# Patient Record
Sex: MALE | Race: OTHER | HISPANIC OR LATINO
[De-identification: names, ages, dates, MRNs, and addresses within clinical notes are randomized per-mention and may not be internally consistent; named-entity substitution may affect disease eponyms.]

---

## 2020-01-01 ENCOUNTER — FORM ENCOUNTER (OUTPATIENT)
Age: 40
End: 2020-01-01

## 2020-01-01 ENCOUNTER — INPATIENT (INPATIENT)
Facility: HOSPITAL | Age: 40
LOS: 8 days | DRG: 682 | End: 2020-10-21
Attending: INTERNAL MEDICINE | Admitting: HOSPITALIST
Payer: MEDICARE

## 2020-01-01 VITALS
SYSTOLIC BLOOD PRESSURE: 114 MMHG | HEIGHT: 66 IN | WEIGHT: 100.09 LBS | OXYGEN SATURATION: 96 % | TEMPERATURE: 98 F | RESPIRATION RATE: 16 BRPM | DIASTOLIC BLOOD PRESSURE: 76 MMHG | HEART RATE: 99 BPM

## 2020-01-01 DIAGNOSIS — Z66 DO NOT RESUSCITATE: ICD-10-CM

## 2020-01-01 DIAGNOSIS — E87.5 HYPERKALEMIA: ICD-10-CM

## 2020-01-01 DIAGNOSIS — F11.10 OPIOID ABUSE, UNCOMPLICATED: ICD-10-CM

## 2020-01-01 DIAGNOSIS — Z51.5 ENCOUNTER FOR PALLIATIVE CARE: ICD-10-CM

## 2020-01-01 DIAGNOSIS — R19.7 DIARRHEA, UNSPECIFIED: ICD-10-CM

## 2020-01-01 DIAGNOSIS — L97.929 NON-PRESSURE CHRONIC ULCER OF UNSPECIFIED PART OF LEFT LOWER LEG WITH UNSPECIFIED SEVERITY: ICD-10-CM

## 2020-01-01 DIAGNOSIS — E83.51 HYPOCALCEMIA: ICD-10-CM

## 2020-01-01 DIAGNOSIS — L03.115 CELLULITIS OF RIGHT LOWER LIMB: ICD-10-CM

## 2020-01-01 DIAGNOSIS — R77.8 OTHER SPECIFIED ABNORMALITIES OF PLASMA PROTEINS: ICD-10-CM

## 2020-01-01 DIAGNOSIS — E87.4 MIXED DISORDER OF ACID-BASE BALANCE: ICD-10-CM

## 2020-01-01 DIAGNOSIS — Z89.511 ACQUIRED ABSENCE OF RIGHT LEG BELOW KNEE: ICD-10-CM

## 2020-01-01 DIAGNOSIS — S78.111A COMPLETE TRAUMATIC AMPUTATION AT LEVEL BETWEEN RIGHT HIP AND KNEE, INITIAL ENCOUNTER: Chronic | ICD-10-CM

## 2020-01-01 DIAGNOSIS — N39.0 URINARY TRACT INFECTION, SITE NOT SPECIFIED: ICD-10-CM

## 2020-01-01 DIAGNOSIS — E83.39 OTHER DISORDERS OF PHOSPHORUS METABOLISM: ICD-10-CM

## 2020-01-01 DIAGNOSIS — R41.82 ALTERED MENTAL STATUS, UNSPECIFIED: ICD-10-CM

## 2020-01-01 DIAGNOSIS — R74.8 ABNORMAL LEVELS OF OTHER SERUM ENZYMES: ICD-10-CM

## 2020-01-01 DIAGNOSIS — I46.9 CARDIAC ARREST, CAUSE UNSPECIFIED: ICD-10-CM

## 2020-01-01 DIAGNOSIS — N17.9 ACUTE KIDNEY FAILURE, UNSPECIFIED: ICD-10-CM

## 2020-01-01 DIAGNOSIS — N18.6 END STAGE RENAL DISEASE: ICD-10-CM

## 2020-01-01 DIAGNOSIS — Z78.1 PHYSICAL RESTRAINT STATUS: ICD-10-CM

## 2020-01-01 DIAGNOSIS — L03.116 CELLULITIS OF LEFT LOWER LIMB: ICD-10-CM

## 2020-01-01 DIAGNOSIS — R11.10 VOMITING, UNSPECIFIED: ICD-10-CM

## 2020-01-01 DIAGNOSIS — B85.4: ICD-10-CM

## 2020-01-01 DIAGNOSIS — G40.901 EPILEPSY, UNSPECIFIED, NOT INTRACTABLE, WITH STATUS EPILEPTICUS: ICD-10-CM

## 2020-01-01 DIAGNOSIS — E86.1 HYPOVOLEMIA: ICD-10-CM

## 2020-01-01 DIAGNOSIS — R63.8 OTHER SYMPTOMS AND SIGNS CONCERNING FOOD AND FLUID INTAKE: ICD-10-CM

## 2020-01-01 DIAGNOSIS — F19.10 OTHER PSYCHOACTIVE SUBSTANCE ABUSE, UNCOMPLICATED: ICD-10-CM

## 2020-01-01 DIAGNOSIS — Z71.89 OTHER SPECIFIED COUNSELING: ICD-10-CM

## 2020-01-01 DIAGNOSIS — F19.11 OTHER PSYCHOACTIVE SUBSTANCE ABUSE, IN REMISSION: ICD-10-CM

## 2020-01-01 DIAGNOSIS — R56.9 UNSPECIFIED CONVULSIONS: ICD-10-CM

## 2020-01-01 DIAGNOSIS — L03.90 CELLULITIS, UNSPECIFIED: ICD-10-CM

## 2020-01-01 DIAGNOSIS — E88.3 TUMOR LYSIS SYNDROME: ICD-10-CM

## 2020-01-01 DIAGNOSIS — D64.9 ANEMIA, UNSPECIFIED: ICD-10-CM

## 2020-01-01 DIAGNOSIS — Z89.512 ACQUIRED ABSENCE OF LEFT LEG BELOW KNEE: ICD-10-CM

## 2020-01-01 DIAGNOSIS — B18.2 CHRONIC VIRAL HEPATITIS C: ICD-10-CM

## 2020-01-01 DIAGNOSIS — Z82.49 FAMILY HISTORY OF ISCHEMIC HEART DISEASE AND OTHER DISEASES OF THE CIRCULATORY SYSTEM: ICD-10-CM

## 2020-01-01 DIAGNOSIS — D47.2 MONOCLONAL GAMMOPATHY: ICD-10-CM

## 2020-01-01 DIAGNOSIS — I24.8 OTHER FORMS OF ACUTE ISCHEMIC HEART DISEASE: ICD-10-CM

## 2020-01-01 DIAGNOSIS — E43 UNSPECIFIED SEVERE PROTEIN-CALORIE MALNUTRITION: ICD-10-CM

## 2020-01-01 DIAGNOSIS — Z83.3 FAMILY HISTORY OF DIABETES MELLITUS: ICD-10-CM

## 2020-01-01 DIAGNOSIS — D50.9 IRON DEFICIENCY ANEMIA, UNSPECIFIED: ICD-10-CM

## 2020-01-01 DIAGNOSIS — B19.20 UNSPECIFIED VIRAL HEPATITIS C WITHOUT HEPATIC COMA: ICD-10-CM

## 2020-01-01 DIAGNOSIS — E87.2 ACIDOSIS: ICD-10-CM

## 2020-01-01 DIAGNOSIS — S78.112A COMPLETE TRAUMATIC AMPUTATION AT LEVEL BETWEEN LEFT HIP AND KNEE, INITIAL ENCOUNTER: Chronic | ICD-10-CM

## 2020-01-01 DIAGNOSIS — Z83.438 FAMILY HISTORY OF OTHER DISORDER OF LIPOPROTEIN METABOLISM AND OTHER LIPIDEMIA: ICD-10-CM

## 2020-01-01 DIAGNOSIS — A04.72 ENTEROCOLITIS DUE TO CLOSTRIDIUM DIFFICILE, NOT SPECIFIED AS RECURRENT: ICD-10-CM

## 2020-01-01 LAB
% ALBUMIN: 27.2 % — SIGNIFICANT CHANGE UP
% ALPHA 1: 8.8 % — SIGNIFICANT CHANGE UP
% ALPHA 2: 17.1 % — SIGNIFICANT CHANGE UP
% BETA: 13.1 % — SIGNIFICANT CHANGE UP
% GAMMA, URINE: 34 % — SIGNIFICANT CHANGE UP
% GAMMA: 33.8 % — SIGNIFICANT CHANGE UP
% M SPIKE: SIGNIFICANT CHANGE UP
ALBUMIN 24H MFR UR ELPH: 30 % — SIGNIFICANT CHANGE UP
ALBUMIN SERPL ELPH-MCNC: 1.2 G/DL — LOW (ref 3.3–5)
ALBUMIN SERPL ELPH-MCNC: 1.5 G/DL — LOW (ref 3.3–5)
ALBUMIN SERPL ELPH-MCNC: 1.7 G/DL — LOW (ref 3.3–5)
ALBUMIN SERPL ELPH-MCNC: 1.9 G/DL — LOW (ref 3.3–5)
ALBUMIN SERPL ELPH-MCNC: 1.9 G/DL — LOW (ref 3.6–5.5)
ALBUMIN SERPL ELPH-MCNC: 2.2 G/DL — LOW (ref 3.3–5)
ALBUMIN SERPL ELPH-MCNC: 2.2 G/DL — LOW (ref 3.3–5)
ALBUMIN/GLOB SERPL ELPH: 0.4 RATIO — SIGNIFICANT CHANGE UP
ALP SERPL-CCNC: 298 U/L — HIGH (ref 40–120)
ALP SERPL-CCNC: 349 U/L — HIGH (ref 40–120)
ALP SERPL-CCNC: 416 U/L — HIGH (ref 40–120)
ALP SERPL-CCNC: 434 U/L — HIGH (ref 40–120)
ALP SERPL-CCNC: 438 U/L — HIGH (ref 40–120)
ALP SERPL-CCNC: 527 U/L — HIGH (ref 40–120)
ALP SERPL-CCNC: 598 U/L — HIGH (ref 40–120)
ALP SERPL-CCNC: 614 U/L — HIGH (ref 40–120)
ALP SERPL-CCNC: 627 U/L — HIGH (ref 40–120)
ALP SERPL-CCNC: 671 U/L — HIGH (ref 40–120)
ALP SERPL-CCNC: 676 U/L — HIGH (ref 40–120)
ALP SERPL-CCNC: SIGNIFICANT CHANGE UP U/L (ref 40–120)
ALPHA1 GLOB 24H MFR UR ELPH: 16.9 % — SIGNIFICANT CHANGE UP
ALPHA1 GLOB SERPL ELPH-MCNC: 0.6 G/DL — HIGH (ref 0.1–0.4)
ALPHA2 GLOB 24H MFR UR ELPH: 5.8 % — SIGNIFICANT CHANGE UP
ALPHA2 GLOB SERPL ELPH-MCNC: 1.2 G/DL — HIGH (ref 0.5–1)
ALT FLD-CCNC: 10 U/L — SIGNIFICANT CHANGE UP (ref 10–45)
ALT FLD-CCNC: 10 U/L — SIGNIFICANT CHANGE UP (ref 10–45)
ALT FLD-CCNC: 11 U/L — SIGNIFICANT CHANGE UP (ref 10–45)
ALT FLD-CCNC: 11 U/L — SIGNIFICANT CHANGE UP (ref 10–45)
ALT FLD-CCNC: 12 U/L — SIGNIFICANT CHANGE UP (ref 10–45)
ALT FLD-CCNC: 13 U/L — SIGNIFICANT CHANGE UP (ref 10–45)
ALT FLD-CCNC: 15 U/L — SIGNIFICANT CHANGE UP (ref 10–45)
ALT FLD-CCNC: SIGNIFICANT CHANGE UP U/L (ref 10–45)
AMMONIA BLD-MCNC: 30 UMOL/L — SIGNIFICANT CHANGE UP (ref 11–55)
AMMONIA BLD-MCNC: 46 UMOL/L — SIGNIFICANT CHANGE UP (ref 11–55)
AMPHET UR-MCNC: NEGATIVE — SIGNIFICANT CHANGE UP
ANA TITR SER: NEGATIVE — SIGNIFICANT CHANGE UP
ANION GAP SERPL CALC-SCNC: 19 MMOL/L — HIGH (ref 5–17)
ANION GAP SERPL CALC-SCNC: 20 MMOL/L — HIGH (ref 5–17)
ANION GAP SERPL CALC-SCNC: 21 MMOL/L — HIGH (ref 5–17)
ANION GAP SERPL CALC-SCNC: 22 MMOL/L — HIGH (ref 5–17)
ANION GAP SERPL CALC-SCNC: 22 MMOL/L — HIGH (ref 5–17)
ANION GAP SERPL CALC-SCNC: 23 MMOL/L — HIGH (ref 5–17)
ANION GAP SERPL CALC-SCNC: 26 MMOL/L — HIGH (ref 5–17)
ANION GAP SERPL CALC-SCNC: 29 MMOL/L — HIGH (ref 5–17)
ANION GAP SERPL CALC-SCNC: 30 MMOL/L — HIGH (ref 5–17)
ANION GAP SERPL CALC-SCNC: 31 MMOL/L — HIGH (ref 5–17)
ANION GAP SERPL CALC-SCNC: 33 MMOL/L — HIGH (ref 5–17)
ANION GAP SERPL CALC-SCNC: 34 MMOL/L — HIGH (ref 5–17)
ANISOCYTOSIS BLD QL: SLIGHT — SIGNIFICANT CHANGE UP
APPEARANCE CSF: CLEAR — SIGNIFICANT CHANGE UP
APPEARANCE SPUN FLD: COLORLESS — SIGNIFICANT CHANGE UP
APPEARANCE UR: CLEAR — SIGNIFICANT CHANGE UP
APTT BLD: 30.3 SEC — SIGNIFICANT CHANGE UP (ref 27.5–35.5)
AST SERPL-CCNC: 21 U/L — SIGNIFICANT CHANGE UP (ref 10–40)
AST SERPL-CCNC: 22 U/L — SIGNIFICANT CHANGE UP (ref 10–40)
AST SERPL-CCNC: 22 U/L — SIGNIFICANT CHANGE UP (ref 10–40)
AST SERPL-CCNC: 23 U/L — SIGNIFICANT CHANGE UP (ref 10–40)
AST SERPL-CCNC: 25 U/L — SIGNIFICANT CHANGE UP (ref 10–40)
AST SERPL-CCNC: 26 U/L — SIGNIFICANT CHANGE UP (ref 10–40)
AST SERPL-CCNC: 26 U/L — SIGNIFICANT CHANGE UP (ref 10–40)
AST SERPL-CCNC: 32 U/L — SIGNIFICANT CHANGE UP (ref 10–40)
AST SERPL-CCNC: 33 U/L — SIGNIFICANT CHANGE UP (ref 10–40)
AST SERPL-CCNC: SIGNIFICANT CHANGE UP U/L (ref 10–40)
AUTO DIFF PNL BLD: NEGATIVE — SIGNIFICANT CHANGE UP
B-GLOBULIN 24H MFR UR ELPH: 13.3 % — SIGNIFICANT CHANGE UP
B-GLOBULIN SERPL ELPH-MCNC: 0.9 G/DL — SIGNIFICANT CHANGE UP (ref 0.5–1)
BACTERIA # UR AUTO: ABNORMAL /HPF
BARBITURATES UR SCN-MCNC: NEGATIVE — SIGNIFICANT CHANGE UP
BASE EXCESS BLDA CALC-SCNC: -10.3 MMOL/L — LOW (ref -2–3)
BASE EXCESS BLDA CALC-SCNC: 1.4 MMOL/L — SIGNIFICANT CHANGE UP (ref -2–3)
BASE EXCESS BLDV CALC-SCNC: 5.2 MMOL/L — SIGNIFICANT CHANGE UP
BASOPHILS # BLD AUTO: 0 K/UL — SIGNIFICANT CHANGE UP (ref 0–0.2)
BASOPHILS # BLD AUTO: 0.01 K/UL — SIGNIFICANT CHANGE UP (ref 0–0.2)
BASOPHILS # BLD AUTO: 0.02 K/UL — SIGNIFICANT CHANGE UP (ref 0–0.2)
BASOPHILS # BLD AUTO: 0.02 K/UL — SIGNIFICANT CHANGE UP (ref 0–0.2)
BASOPHILS # BLD AUTO: 0.03 K/UL — SIGNIFICANT CHANGE UP (ref 0–0.2)
BASOPHILS NFR BLD AUTO: 0 % — SIGNIFICANT CHANGE UP (ref 0–2)
BASOPHILS NFR BLD AUTO: 0.1 % — SIGNIFICANT CHANGE UP (ref 0–2)
BASOPHILS NFR BLD AUTO: 0.2 % — SIGNIFICANT CHANGE UP (ref 0–2)
BASOPHILS NFR BLD AUTO: 0.3 % — SIGNIFICANT CHANGE UP (ref 0–2)
BASOPHILS NFR BLD AUTO: 0.3 % — SIGNIFICANT CHANGE UP (ref 0–2)
BENZODIAZ UR-MCNC: POSITIVE
BILIRUB SERPL-MCNC: 0.2 MG/DL — SIGNIFICANT CHANGE UP (ref 0.2–1.2)
BILIRUB SERPL-MCNC: 0.2 MG/DL — SIGNIFICANT CHANGE UP (ref 0.2–1.2)
BILIRUB SERPL-MCNC: 0.3 MG/DL — SIGNIFICANT CHANGE UP (ref 0.2–1.2)
BILIRUB SERPL-MCNC: 0.4 MG/DL — SIGNIFICANT CHANGE UP (ref 0.2–1.2)
BILIRUB SERPL-MCNC: 0.5 MG/DL — SIGNIFICANT CHANGE UP (ref 0.2–1.2)
BILIRUB SERPL-MCNC: 0.6 MG/DL — SIGNIFICANT CHANGE UP (ref 0.2–1.2)
BILIRUB SERPL-MCNC: 0.8 MG/DL — SIGNIFICANT CHANGE UP (ref 0.2–1.2)
BILIRUB SERPL-MCNC: <0.2 MG/DL — SIGNIFICANT CHANGE UP (ref 0.2–1.2)
BILIRUB UR-MCNC: ABNORMAL
BUN SERPL-MCNC: 185 MG/DL — HIGH (ref 7–23)
BUN SERPL-MCNC: 187 MG/DL — HIGH (ref 7–23)
BUN SERPL-MCNC: 192 MG/DL — HIGH (ref 7–23)
BUN SERPL-MCNC: 43 MG/DL — HIGH (ref 7–23)
BUN SERPL-MCNC: 50 MG/DL — HIGH (ref 7–23)
BUN SERPL-MCNC: 54 MG/DL — HIGH (ref 7–23)
BUN SERPL-MCNC: 59 MG/DL — HIGH (ref 7–23)
BUN SERPL-MCNC: 59 MG/DL — HIGH (ref 7–23)
BUN SERPL-MCNC: 60 MG/DL — HIGH (ref 7–23)
BUN SERPL-MCNC: 62 MG/DL — HIGH (ref 7–23)
BUN SERPL-MCNC: 62 MG/DL — HIGH (ref 7–23)
BUN SERPL-MCNC: 63 MG/DL — HIGH (ref 7–23)
BUN SERPL-MCNC: 81 MG/DL — HIGH (ref 7–23)
BUN SERPL-MCNC: 88 MG/DL — HIGH (ref 7–23)
C DIFF GDH STL QL: SIGNIFICANT CHANGE UP
C DIFF GDH STL QL: SIGNIFICANT CHANGE UP
C-ANCA SER-ACNC: NEGATIVE — SIGNIFICANT CHANGE UP
C3 SERPL-MCNC: 74 MG/DL — LOW (ref 81–157)
C3 SERPL-MCNC: 91 MG/DL — SIGNIFICANT CHANGE UP (ref 81–157)
C4 SERPL-MCNC: 17 MG/DL — SIGNIFICANT CHANGE UP (ref 13–39)
C4 SERPL-MCNC: 22 MG/DL — SIGNIFICANT CHANGE UP (ref 13–39)
CA-I SERPL-SCNC: 0.6 MMOL/L — CRITICAL LOW (ref 1.12–1.3)
CALCIUM SERPL-MCNC: 6.1 MG/DL — CRITICAL LOW (ref 8.4–10.5)
CALCIUM SERPL-MCNC: 6.1 MG/DL — CRITICAL LOW (ref 8.4–10.5)
CALCIUM SERPL-MCNC: 6.4 MG/DL — CRITICAL LOW (ref 8.4–10.5)
CALCIUM SERPL-MCNC: 6.6 MG/DL — LOW (ref 8.4–10.5)
CALCIUM SERPL-MCNC: 6.6 MG/DL — LOW (ref 8.4–10.5)
CALCIUM SERPL-MCNC: 6.8 MG/DL — LOW (ref 8.4–10.5)
CALCIUM SERPL-MCNC: 6.8 MG/DL — LOW (ref 8.4–10.5)
CALCIUM SERPL-MCNC: 7.1 MG/DL — LOW (ref 8.4–10.5)
CALCIUM SERPL-MCNC: 7.3 MG/DL — LOW (ref 8.4–10.5)
CALCIUM SERPL-MCNC: 7.4 MG/DL — LOW (ref 8.4–10.5)
CALCIUM SERPL-MCNC: 7.6 MG/DL — LOW (ref 8.4–10.5)
CALCIUM SERPL-MCNC: 7.6 MG/DL — LOW (ref 8.4–10.5)
CALCIUM SERPL-MCNC: 7.8 MG/DL — LOW (ref 8.4–10.5)
CALCIUM SERPL-MCNC: 8.2 MG/DL — LOW (ref 8.4–10.5)
CALCIUM SERPL-MCNC: 8.4 MG/DL — SIGNIFICANT CHANGE UP (ref 8.4–10.5)
CHLORIDE SERPL-SCNC: 75 MMOL/L — LOW (ref 96–108)
CHLORIDE SERPL-SCNC: 79 MMOL/L — LOW (ref 96–108)
CHLORIDE SERPL-SCNC: 81 MMOL/L — LOW (ref 96–108)
CHLORIDE SERPL-SCNC: 89 MMOL/L — LOW (ref 96–108)
CHLORIDE SERPL-SCNC: 89 MMOL/L — LOW (ref 96–108)
CHLORIDE SERPL-SCNC: 91 MMOL/L — LOW (ref 96–108)
CHLORIDE SERPL-SCNC: 92 MMOL/L — LOW (ref 96–108)
CHLORIDE SERPL-SCNC: 92 MMOL/L — LOW (ref 96–108)
CHLORIDE SERPL-SCNC: 96 MMOL/L — SIGNIFICANT CHANGE UP (ref 96–108)
CHLORIDE SERPL-SCNC: 97 MMOL/L — SIGNIFICANT CHANGE UP (ref 96–108)
CHLORIDE SERPL-SCNC: 98 MMOL/L — SIGNIFICANT CHANGE UP (ref 96–108)
CHLORIDE SERPL-SCNC: 99 MMOL/L — SIGNIFICANT CHANGE UP (ref 96–108)
CHLORIDE UR-SCNC: <20 MMOL/L — SIGNIFICANT CHANGE UP
CK SERPL-CCNC: 257 U/L — HIGH (ref 30–200)
CK SERPL-CCNC: 361 U/L — HIGH (ref 30–200)
CK SERPL-CCNC: 77 U/L — SIGNIFICANT CHANGE UP (ref 30–200)
CO2 SERPL-SCNC: 16 MMOL/L — LOW (ref 22–31)
CO2 SERPL-SCNC: 17 MMOL/L — LOW (ref 22–31)
CO2 SERPL-SCNC: 19 MMOL/L — LOW (ref 22–31)
CO2 SERPL-SCNC: 22 MMOL/L — SIGNIFICANT CHANGE UP (ref 22–31)
CO2 SERPL-SCNC: 22 MMOL/L — SIGNIFICANT CHANGE UP (ref 22–31)
CO2 SERPL-SCNC: 23 MMOL/L — SIGNIFICANT CHANGE UP (ref 22–31)
CO2 SERPL-SCNC: 25 MMOL/L — SIGNIFICANT CHANGE UP (ref 22–31)
CO2 SERPL-SCNC: 28 MMOL/L — SIGNIFICANT CHANGE UP (ref 22–31)
CO2 SERPL-SCNC: 30 MMOL/L — SIGNIFICANT CHANGE UP (ref 22–31)
CO2 SERPL-SCNC: 31 MMOL/L — SIGNIFICANT CHANGE UP (ref 22–31)
COCAINE METAB.OTHER UR-MCNC: POSITIVE
COLOR CSF: SIGNIFICANT CHANGE UP
COLOR SPEC: YELLOW — SIGNIFICANT CHANGE UP
COMMENT - URINE: SIGNIFICANT CHANGE UP
CREAT ?TM UR-MCNC: 21 MG/DL — SIGNIFICANT CHANGE UP
CREAT ?TM UR-MCNC: 36 MG/DL — SIGNIFICANT CHANGE UP
CREAT SERPL-MCNC: 4.09 MG/DL — HIGH (ref 0.5–1.3)
CREAT SERPL-MCNC: 5.58 MG/DL — HIGH (ref 0.5–1.3)
CREAT SERPL-MCNC: 5.64 MG/DL — HIGH (ref 0.5–1.3)
CREAT SERPL-MCNC: 6.13 MG/DL — HIGH (ref 0.5–1.3)
CREAT SERPL-MCNC: 6.92 MG/DL — HIGH (ref 0.5–1.3)
CREAT SERPL-MCNC: 8.25 MG/DL — HIGH (ref 0.5–1.3)
CREAT SERPL-MCNC: 8.31 MG/DL — HIGH (ref 0.5–1.3)
CREAT SERPL-MCNC: 8.36 MG/DL — HIGH (ref 0.5–1.3)
CREAT SERPL-MCNC: 8.4 MG/DL — HIGH (ref 0.5–1.3)
CREAT SERPL-MCNC: 8.49 MG/DL — HIGH (ref 0.5–1.3)
CREAT SERPL-MCNC: 8.65 MG/DL — HIGH (ref 0.5–1.3)
CREAT SERPL-MCNC: 9.62 MG/DL — HIGH (ref 0.5–1.3)
CREAT SERPL-MCNC: 9.86 MG/DL — HIGH (ref 0.5–1.3)
CREAT SERPL-MCNC: 9.92 MG/DL — HIGH (ref 0.5–1.3)
CRYOGLOB SERPL-MCNC: NEGATIVE — SIGNIFICANT CHANGE UP
CRYOGLOB SERPL-MCNC: NEGATIVE — SIGNIFICANT CHANGE UP
CSF PCR RESULT: SIGNIFICANT CHANGE UP
CULTURE RESULTS: SIGNIFICANT CHANGE UP
DIFF PNL FLD: ABNORMAL
DSDNA AB SER-ACNC: <12 IU/ML — SIGNIFICANT CHANGE UP
EOSINOPHIL # BLD AUTO: 0 K/UL — SIGNIFICANT CHANGE UP (ref 0–0.5)
EOSINOPHIL # BLD AUTO: 0.01 K/UL — SIGNIFICANT CHANGE UP (ref 0–0.5)
EOSINOPHIL # BLD AUTO: 0.02 K/UL — SIGNIFICANT CHANGE UP (ref 0–0.5)
EOSINOPHIL # BLD AUTO: 0.02 K/UL — SIGNIFICANT CHANGE UP (ref 0–0.5)
EOSINOPHIL # BLD AUTO: 0.09 K/UL — SIGNIFICANT CHANGE UP (ref 0–0.5)
EOSINOPHIL # BLD AUTO: 0.13 K/UL — SIGNIFICANT CHANGE UP (ref 0–0.5)
EOSINOPHIL # BLD AUTO: 0.19 K/UL — SIGNIFICANT CHANGE UP (ref 0–0.5)
EOSINOPHIL NFR BLD AUTO: 0 % — SIGNIFICANT CHANGE UP (ref 0–6)
EOSINOPHIL NFR BLD AUTO: 0.1 % — SIGNIFICANT CHANGE UP (ref 0–6)
EOSINOPHIL NFR BLD AUTO: 0.3 % — SIGNIFICANT CHANGE UP (ref 0–6)
EOSINOPHIL NFR BLD AUTO: 0.3 % — SIGNIFICANT CHANGE UP (ref 0–6)
EOSINOPHIL NFR BLD AUTO: 0.9 % — SIGNIFICANT CHANGE UP (ref 0–6)
EOSINOPHIL NFR BLD AUTO: 1 % — SIGNIFICANT CHANGE UP (ref 0–6)
EOSINOPHIL NFR BLD AUTO: 1.3 % — SIGNIFICANT CHANGE UP (ref 0–6)
EOSINOPHIL NFR BLD AUTO: 1.8 % — SIGNIFICANT CHANGE UP (ref 0–6)
EPI CELLS # UR: SIGNIFICANT CHANGE UP /HPF (ref 0–5)
GAMMA GLOBULIN: 2.4 G/DL — HIGH (ref 0.6–1.6)
GAMMA INTERFERON BACKGROUND BLD IA-ACNC: 0.01 IU/ML — SIGNIFICANT CHANGE UP
GAMMA INTERFERON BACKGROUND BLD IA-ACNC: 0.02 IU/ML — SIGNIFICANT CHANGE UP
GAS PNL BLDA: SIGNIFICANT CHANGE UP
GAS PNL BLDV: 126 MMOL/L — LOW (ref 138–146)
GAS PNL BLDV: SIGNIFICANT CHANGE UP
GBM IGG SER-ACNC: <1 AI — SIGNIFICANT CHANGE UP
GLUCOSE BLDC GLUCOMTR-MCNC: 113 MG/DL — HIGH (ref 70–99)
GLUCOSE BLDC GLUCOMTR-MCNC: 124 MG/DL — HIGH (ref 70–99)
GLUCOSE BLDC GLUCOMTR-MCNC: 50 MG/DL — LOW (ref 70–99)
GLUCOSE BLDC GLUCOMTR-MCNC: 56 MG/DL — LOW (ref 70–99)
GLUCOSE BLDC GLUCOMTR-MCNC: 60 MG/DL — LOW (ref 70–99)
GLUCOSE BLDC GLUCOMTR-MCNC: 63 MG/DL — LOW (ref 70–99)
GLUCOSE BLDC GLUCOMTR-MCNC: 80 MG/DL — SIGNIFICANT CHANGE UP (ref 70–99)
GLUCOSE BLDC GLUCOMTR-MCNC: 84 MG/DL — SIGNIFICANT CHANGE UP (ref 70–99)
GLUCOSE BLDC GLUCOMTR-MCNC: 86 MG/DL — SIGNIFICANT CHANGE UP (ref 70–99)
GLUCOSE BLDC GLUCOMTR-MCNC: 89 MG/DL — SIGNIFICANT CHANGE UP (ref 70–99)
GLUCOSE BLDC GLUCOMTR-MCNC: 94 MG/DL — SIGNIFICANT CHANGE UP (ref 70–99)
GLUCOSE BLDC GLUCOMTR-MCNC: 95 MG/DL — SIGNIFICANT CHANGE UP (ref 70–99)
GLUCOSE BLDC GLUCOMTR-MCNC: 96 MG/DL — SIGNIFICANT CHANGE UP (ref 70–99)
GLUCOSE BLDC GLUCOMTR-MCNC: 99 MG/DL — SIGNIFICANT CHANGE UP (ref 70–99)
GLUCOSE CSF-MCNC: 45 MG/DL — SIGNIFICANT CHANGE UP (ref 40–70)
GLUCOSE SERPL-MCNC: 292 MG/DL — HIGH (ref 70–99)
GLUCOSE SERPL-MCNC: 52 MG/DL — LOW (ref 70–99)
GLUCOSE SERPL-MCNC: 58 MG/DL — LOW (ref 70–99)
GLUCOSE SERPL-MCNC: 62 MG/DL — LOW (ref 70–99)
GLUCOSE SERPL-MCNC: 65 MG/DL — LOW (ref 70–99)
GLUCOSE SERPL-MCNC: 65 MG/DL — LOW (ref 70–99)
GLUCOSE SERPL-MCNC: 74 MG/DL — SIGNIFICANT CHANGE UP (ref 70–99)
GLUCOSE SERPL-MCNC: 75 MG/DL — SIGNIFICANT CHANGE UP (ref 70–99)
GLUCOSE SERPL-MCNC: 80 MG/DL — SIGNIFICANT CHANGE UP (ref 70–99)
GLUCOSE SERPL-MCNC: 81 MG/DL — SIGNIFICANT CHANGE UP (ref 70–99)
GLUCOSE SERPL-MCNC: 83 MG/DL — SIGNIFICANT CHANGE UP (ref 70–99)
GLUCOSE SERPL-MCNC: 85 MG/DL — SIGNIFICANT CHANGE UP (ref 70–99)
GLUCOSE SERPL-MCNC: 95 MG/DL — SIGNIFICANT CHANGE UP (ref 70–99)
GLUCOSE SERPL-MCNC: 96 MG/DL — SIGNIFICANT CHANGE UP (ref 70–99)
GLUCOSE UR QL: NEGATIVE — SIGNIFICANT CHANGE UP
GRAM STN FLD: SIGNIFICANT CHANGE UP
HAV IGM SER-ACNC: SIGNIFICANT CHANGE UP
HBV CORE IGM SER-ACNC: SIGNIFICANT CHANGE UP
HBV SURFACE AB SER-ACNC: REACTIVE — SIGNIFICANT CHANGE UP
HCO3 BLDA-SCNC: 15 MMOL/L — LOW (ref 21–28)
HCO3 BLDA-SCNC: 25 MMOL/L — SIGNIFICANT CHANGE UP (ref 21–28)
HCO3 BLDV-SCNC: 33 MMOL/L — HIGH (ref 20–27)
HCT VFR BLD CALC: 22 % — LOW (ref 39–50)
HCT VFR BLD CALC: 23.2 % — LOW (ref 39–50)
HCT VFR BLD CALC: 24.8 % — LOW (ref 39–50)
HCT VFR BLD CALC: 26.4 % — LOW (ref 39–50)
HCT VFR BLD CALC: 28.9 % — LOW (ref 39–50)
HCT VFR BLD CALC: 29.7 % — LOW (ref 39–50)
HCT VFR BLD CALC: 29.7 % — LOW (ref 39–50)
HCT VFR BLD CALC: 30 % — LOW (ref 39–50)
HCT VFR BLD CALC: 30.9 % — LOW (ref 39–50)
HCT VFR BLD CALC: 32.4 % — LOW (ref 39–50)
HCT VFR BLD CALC: 35.4 % — LOW (ref 39–50)
HGB BLD-MCNC: 11.2 G/DL — LOW (ref 13–17)
HGB BLD-MCNC: 6.7 G/DL — CRITICAL LOW (ref 13–17)
HGB BLD-MCNC: 7.3 G/DL — LOW (ref 13–17)
HGB BLD-MCNC: 7.8 G/DL — LOW (ref 13–17)
HGB BLD-MCNC: 8.1 G/DL — LOW (ref 13–17)
HGB BLD-MCNC: 8.4 G/DL — LOW (ref 13–17)
HGB BLD-MCNC: 8.8 G/DL — LOW (ref 13–17)
HGB BLD-MCNC: 9.1 G/DL — LOW (ref 13–17)
HGB BLD-MCNC: 9.3 G/DL — LOW (ref 13–17)
HGB BLD-MCNC: 9.4 G/DL — LOW (ref 13–17)
HGB BLD-MCNC: 9.5 G/DL — LOW (ref 13–17)
HIV 1+2 AB+HIV1 P24 AG SERPL QL IA: SIGNIFICANT CHANGE UP
HYPOCHROMIA BLD QL: SLIGHT — SIGNIFICANT CHANGE UP
IMM GRANULOCYTES NFR BLD AUTO: 0.4 % — SIGNIFICANT CHANGE UP (ref 0–1.5)
IMM GRANULOCYTES NFR BLD AUTO: 0.6 % — SIGNIFICANT CHANGE UP (ref 0–1.5)
IMM GRANULOCYTES NFR BLD AUTO: 0.7 % — SIGNIFICANT CHANGE UP (ref 0–1.5)
IMM GRANULOCYTES NFR BLD AUTO: 0.7 % — SIGNIFICANT CHANGE UP (ref 0–1.5)
INR BLD: 1.21 — HIGH (ref 0.88–1.16)
INTERPRETATION 24H UR IFE-IMP: SIGNIFICANT CHANGE UP
INTERPRETATION SERPL IFE-IMP: SIGNIFICANT CHANGE UP
INTERPRETATION SERPL IFE-IMP: SIGNIFICANT CHANGE UP
KAPPA LC SER QL IFE: 48.28 MG/DL — HIGH (ref 0.33–1.94)
KAPPA/LAMBDA FREE LIGHT CHAIN RATIO, SERUM: 0.61 RATIO — SIGNIFICANT CHANGE UP (ref 0.26–1.65)
KETONES UR-MCNC: ABNORMAL MG/DL
LACTATE SERPL-SCNC: 0.6 MMOL/L — SIGNIFICANT CHANGE UP (ref 0.5–2)
LACTATE SERPL-SCNC: 0.8 MMOL/L — SIGNIFICANT CHANGE UP (ref 0.5–2)
LACTATE SERPL-SCNC: 0.8 MMOL/L — SIGNIFICANT CHANGE UP (ref 0.5–2)
LACTATE SERPL-SCNC: 5.5 MMOL/L — CRITICAL HIGH (ref 0.5–2)
LAMBDA LC SER QL IFE: 79.37 MG/DL — HIGH (ref 0.57–2.63)
LEAD BLD-MCNC: 2 UG/DL — SIGNIFICANT CHANGE UP (ref 0–4)
LEUKOCYTE ESTERASE UR-ACNC: ABNORMAL
LIDOCAIN IGE QN: 14 U/L — SIGNIFICANT CHANGE UP (ref 7–60)
LYMPHOCYTES # BLD AUTO: 0.9 K/UL — LOW (ref 1–3.3)
LYMPHOCYTES # BLD AUTO: 1.26 K/UL — SIGNIFICANT CHANGE UP (ref 1–3.3)
LYMPHOCYTES # BLD AUTO: 1.31 K/UL — SIGNIFICANT CHANGE UP (ref 1–3.3)
LYMPHOCYTES # BLD AUTO: 1.36 K/UL — SIGNIFICANT CHANGE UP (ref 1–3.3)
LYMPHOCYTES # BLD AUTO: 12.3 % — LOW (ref 13–44)
LYMPHOCYTES # BLD AUTO: 13 % — SIGNIFICANT CHANGE UP (ref 13–44)
LYMPHOCYTES # BLD AUTO: 18.7 % — SIGNIFICANT CHANGE UP (ref 13–44)
LYMPHOCYTES # BLD AUTO: 19.1 % — SIGNIFICANT CHANGE UP (ref 13–44)
LYMPHOCYTES # BLD AUTO: 2.97 K/UL — SIGNIFICANT CHANGE UP (ref 1–3.3)
LYMPHOCYTES # BLD AUTO: 21.3 % — SIGNIFICANT CHANGE UP (ref 13–44)
LYMPHOCYTES # BLD AUTO: 29 % — SIGNIFICANT CHANGE UP (ref 13–44)
LYMPHOCYTES # BLD AUTO: 3.14 K/UL — SIGNIFICANT CHANGE UP (ref 1–3.3)
LYMPHOCYTES # BLD AUTO: 3.16 K/UL — SIGNIFICANT CHANGE UP (ref 1–3.3)
LYMPHOCYTES # BLD AUTO: 30.6 % — SIGNIFICANT CHANGE UP (ref 13–44)
LYMPHOCYTES # BLD AUTO: 31.5 % — SIGNIFICANT CHANGE UP (ref 13–44)
LYMPHOCYTES # CSF: 1 % — LOW (ref 40–80)
M PROTEIN 24H UR ELPH-MRATE: SIGNIFICANT CHANGE UP
M TB IFN-G BLD-IMP: ABNORMAL
M TB IFN-G BLD-IMP: ABNORMAL
M TB IFN-G CD4+ BCKGRND COR BLD-ACNC: -0.01 IU/ML — SIGNIFICANT CHANGE UP
M TB IFN-G CD4+ BCKGRND COR BLD-ACNC: 0 IU/ML — SIGNIFICANT CHANGE UP
M TB IFN-G CD4+CD8+ BCKGRND COR BLD-ACNC: -0.01 IU/ML — SIGNIFICANT CHANGE UP
M TB IFN-G CD4+CD8+ BCKGRND COR BLD-ACNC: 0 IU/ML — SIGNIFICANT CHANGE UP
M-SPIKE: SIGNIFICANT CHANGE UP (ref 0–0)
MAGNESIUM SERPL-MCNC: 1.8 MG/DL — SIGNIFICANT CHANGE UP (ref 1.6–2.6)
MAGNESIUM SERPL-MCNC: 2 MG/DL — SIGNIFICANT CHANGE UP (ref 1.6–2.6)
MAGNESIUM SERPL-MCNC: 2.1 MG/DL — SIGNIFICANT CHANGE UP (ref 1.6–2.6)
MAGNESIUM SERPL-MCNC: 2.1 MG/DL — SIGNIFICANT CHANGE UP (ref 1.6–2.6)
MAGNESIUM SERPL-MCNC: 2.2 MG/DL — SIGNIFICANT CHANGE UP (ref 1.6–2.6)
MAGNESIUM SERPL-MCNC: 2.7 MG/DL — HIGH (ref 1.6–2.6)
MAGNESIUM SERPL-MCNC: 2.8 MG/DL — HIGH (ref 1.6–2.6)
MANUAL SMEAR VERIFICATION: SIGNIFICANT CHANGE UP
MCHC RBC-ENTMCNC: 24.1 PG — LOW (ref 27–34)
MCHC RBC-ENTMCNC: 24.2 PG — LOW (ref 27–34)
MCHC RBC-ENTMCNC: 24.2 PG — LOW (ref 27–34)
MCHC RBC-ENTMCNC: 24.3 PG — LOW (ref 27–34)
MCHC RBC-ENTMCNC: 24.4 PG — LOW (ref 27–34)
MCHC RBC-ENTMCNC: 24.6 PG — LOW (ref 27–34)
MCHC RBC-ENTMCNC: 24.8 PG — LOW (ref 27–34)
MCHC RBC-ENTMCNC: 24.8 PG — LOW (ref 27–34)
MCHC RBC-ENTMCNC: 25.1 PG — LOW (ref 27–34)
MCHC RBC-ENTMCNC: 29 GM/DL — LOW (ref 32–36)
MCHC RBC-ENTMCNC: 29.1 GM/DL — LOW (ref 32–36)
MCHC RBC-ENTMCNC: 29.6 GM/DL — LOW (ref 32–36)
MCHC RBC-ENTMCNC: 30.1 GM/DL — LOW (ref 32–36)
MCHC RBC-ENTMCNC: 30.5 GM/DL — LOW (ref 32–36)
MCHC RBC-ENTMCNC: 30.6 GM/DL — LOW (ref 32–36)
MCHC RBC-ENTMCNC: 30.7 GM/DL — LOW (ref 32–36)
MCHC RBC-ENTMCNC: 31.5 GM/DL — LOW (ref 32–36)
MCHC RBC-ENTMCNC: 31.5 GM/DL — LOW (ref 32–36)
MCHC RBC-ENTMCNC: 31.6 GM/DL — LOW (ref 32–36)
MCHC RBC-ENTMCNC: 31.7 GM/DL — LOW (ref 32–36)
MCV RBC AUTO: 77 FL — LOW (ref 80–100)
MCV RBC AUTO: 77.1 FL — LOW (ref 80–100)
MCV RBC AUTO: 77.8 FL — LOW (ref 80–100)
MCV RBC AUTO: 78.1 FL — LOW (ref 80–100)
MCV RBC AUTO: 80.7 FL — SIGNIFICANT CHANGE UP (ref 80–100)
MCV RBC AUTO: 80.7 FL — SIGNIFICANT CHANGE UP (ref 80–100)
MCV RBC AUTO: 81.5 FL — SIGNIFICANT CHANGE UP (ref 80–100)
MCV RBC AUTO: 82 FL — SIGNIFICANT CHANGE UP (ref 80–100)
MCV RBC AUTO: 82.8 FL — SIGNIFICANT CHANGE UP (ref 80–100)
MCV RBC AUTO: 83.2 FL — SIGNIFICANT CHANGE UP (ref 80–100)
MCV RBC AUTO: 83.5 FL — SIGNIFICANT CHANGE UP (ref 80–100)
METHADONE UR-MCNC: POSITIVE
MICROCYTES BLD QL: SLIGHT — SIGNIFICANT CHANGE UP
MONOCYTES # BLD AUTO: 0.28 K/UL — SIGNIFICANT CHANGE UP (ref 0–0.9)
MONOCYTES # BLD AUTO: 0.28 K/UL — SIGNIFICANT CHANGE UP (ref 0–0.9)
MONOCYTES # BLD AUTO: 0.33 K/UL — SIGNIFICANT CHANGE UP (ref 0–0.9)
MONOCYTES # BLD AUTO: 0.34 K/UL — SIGNIFICANT CHANGE UP (ref 0–0.9)
MONOCYTES # BLD AUTO: 0.41 K/UL — SIGNIFICANT CHANGE UP (ref 0–0.9)
MONOCYTES # BLD AUTO: 0.43 K/UL — SIGNIFICANT CHANGE UP (ref 0–0.9)
MONOCYTES # BLD AUTO: 0.53 K/UL — SIGNIFICANT CHANGE UP (ref 0–0.9)
MONOCYTES NFR BLD AUTO: 3 % — SIGNIFICANT CHANGE UP (ref 2–14)
MONOCYTES NFR BLD AUTO: 3.3 % — SIGNIFICANT CHANGE UP (ref 2–14)
MONOCYTES NFR BLD AUTO: 3.8 % — SIGNIFICANT CHANGE UP (ref 2–14)
MONOCYTES NFR BLD AUTO: 4.2 % — SIGNIFICANT CHANGE UP (ref 2–14)
MONOCYTES NFR BLD AUTO: 4.4 % — SIGNIFICANT CHANGE UP (ref 2–14)
MONOCYTES NFR BLD AUTO: 4.9 % — SIGNIFICANT CHANGE UP (ref 2–14)
MONOCYTES NFR BLD AUTO: 5.1 % — SIGNIFICANT CHANGE UP (ref 2–14)
MONOCYTES NFR BLD AUTO: 6.1 % — SIGNIFICANT CHANGE UP (ref 2–14)
NEUTROPHILS # BLD AUTO: 4.69 K/UL — SIGNIFICANT CHANGE UP (ref 1.8–7.4)
NEUTROPHILS # BLD AUTO: 4.96 K/UL — SIGNIFICANT CHANGE UP (ref 1.8–7.4)
NEUTROPHILS # BLD AUTO: 5.17 K/UL — SIGNIFICANT CHANGE UP (ref 1.8–7.4)
NEUTROPHILS # BLD AUTO: 6.1 K/UL — SIGNIFICANT CHANGE UP (ref 1.8–7.4)
NEUTROPHILS # BLD AUTO: 6.11 K/UL — SIGNIFICANT CHANGE UP (ref 1.8–7.4)
NEUTROPHILS # BLD AUTO: 6.36 K/UL — SIGNIFICANT CHANGE UP (ref 1.8–7.4)
NEUTROPHILS # BLD AUTO: 6.88 K/UL — SIGNIFICANT CHANGE UP (ref 1.8–7.4)
NEUTROPHILS # CSF: 1 % — SIGNIFICANT CHANGE UP (ref 0–6)
NEUTROPHILS NFR BLD AUTO: 63 % — SIGNIFICANT CHANGE UP (ref 43–77)
NEUTROPHILS NFR BLD AUTO: 63.4 % — SIGNIFICANT CHANGE UP (ref 43–77)
NEUTROPHILS NFR BLD AUTO: 63.6 % — SIGNIFICANT CHANGE UP (ref 43–77)
NEUTROPHILS NFR BLD AUTO: 73.2 % — SIGNIFICANT CHANGE UP (ref 43–77)
NEUTROPHILS NFR BLD AUTO: 73.9 % — SIGNIFICANT CHANGE UP (ref 43–77)
NEUTROPHILS NFR BLD AUTO: 75 % — SIGNIFICANT CHANGE UP (ref 43–77)
NEUTROPHILS NFR BLD AUTO: 83 % — HIGH (ref 43–77)
NEUTROPHILS NFR BLD AUTO: 83.4 % — HIGH (ref 43–77)
NITRITE UR-MCNC: NEGATIVE — SIGNIFICANT CHANGE UP
NRBC # BLD: 0 /100 WBCS — SIGNIFICANT CHANGE UP (ref 0–0)
NRBC NFR CSF: 2 /UL — SIGNIFICANT CHANGE UP (ref 0–5)
OPIATES UR-MCNC: POSITIVE
OSMOLALITY UR: 332 MOSM/KG — SIGNIFICANT CHANGE UP (ref 300–900)
OVALOCYTES BLD QL SMEAR: SLIGHT — SIGNIFICANT CHANGE UP
P-ANCA SER-ACNC: NEGATIVE — SIGNIFICANT CHANGE UP
PCO2 BLDA: 33 MMHG — LOW (ref 35–48)
PCO2 BLDA: 34 MMHG — LOW (ref 35–48)
PCO2 BLDV: 60 MMHG — HIGH (ref 41–51)
PCP SPEC-MCNC: SIGNIFICANT CHANGE UP
PCP UR-MCNC: NEGATIVE — SIGNIFICANT CHANGE UP
PH BLDA: 7.29 — LOW (ref 7.35–7.45)
PH BLDA: 7.48 — HIGH (ref 7.35–7.45)
PH BLDV: 7.35 — SIGNIFICANT CHANGE UP (ref 7.32–7.43)
PH FLD: 8.3 — SIGNIFICANT CHANGE UP
PH UR: 7 — SIGNIFICANT CHANGE UP (ref 5–8)
PHOSPHATE SERPL-MCNC: 10.1 MG/DL — HIGH (ref 2.5–4.5)
PHOSPHATE SERPL-MCNC: 10.6 MG/DL — HIGH (ref 2.5–4.5)
PHOSPHATE SERPL-MCNC: 16.8 MG/DL — HIGH (ref 2.5–4.5)
PHOSPHATE SERPL-MCNC: 6.6 MG/DL — HIGH (ref 2.5–4.5)
PHOSPHATE SERPL-MCNC: 7.6 MG/DL — HIGH (ref 2.5–4.5)
PHOSPHATE SERPL-MCNC: 7.7 MG/DL — HIGH (ref 2.5–4.5)
PHOSPHATE SERPL-MCNC: 8.3 MG/DL — HIGH (ref 2.5–4.5)
PHOSPHATE SERPL-MCNC: 8.6 MG/DL — HIGH (ref 2.5–4.5)
PHOSPHATE SERPL-MCNC: 9 MG/DL — HIGH (ref 2.5–4.5)
PHOSPHOLIPASE A2 RECEPTOR ELISA: <2 RU/ML — SIGNIFICANT CHANGE UP
PHOSPHOLIPASE A2 RECEPTOR IFA: NEGATIVE — SIGNIFICANT CHANGE UP
PLAT MORPH BLD: ABNORMAL
PLATELET # BLD AUTO: 296 K/UL — SIGNIFICANT CHANGE UP (ref 150–400)
PLATELET # BLD AUTO: 330 K/UL — SIGNIFICANT CHANGE UP (ref 150–400)
PLATELET # BLD AUTO: 336 K/UL — SIGNIFICANT CHANGE UP (ref 150–400)
PLATELET # BLD AUTO: 349 K/UL — SIGNIFICANT CHANGE UP (ref 150–400)
PLATELET # BLD AUTO: 351 K/UL — SIGNIFICANT CHANGE UP (ref 150–400)
PLATELET # BLD AUTO: 352 K/UL — SIGNIFICANT CHANGE UP (ref 150–400)
PLATELET # BLD AUTO: 355 K/UL — SIGNIFICANT CHANGE UP (ref 150–400)
PLATELET # BLD AUTO: 367 K/UL — SIGNIFICANT CHANGE UP (ref 150–400)
PLATELET # BLD AUTO: 368 K/UL — SIGNIFICANT CHANGE UP (ref 150–400)
PLATELET # BLD AUTO: 490 K/UL — HIGH (ref 150–400)
PLATELET # BLD AUTO: 577 K/UL — HIGH (ref 150–400)
PO2 BLDA: 119 MMHG — HIGH (ref 83–108)
PO2 BLDA: 168 MMHG — HIGH (ref 83–108)
PO2 BLDV: 38 MMHG — SIGNIFICANT CHANGE UP
POTASSIUM BLDV-SCNC: 5 MMOL/L — HIGH (ref 3.5–4.9)
POTASSIUM SERPL-MCNC: 2.7 MMOL/L — CRITICAL LOW (ref 3.5–5.3)
POTASSIUM SERPL-MCNC: 3 MMOL/L — LOW (ref 3.5–5.3)
POTASSIUM SERPL-MCNC: 3.1 MMOL/L — LOW (ref 3.5–5.3)
POTASSIUM SERPL-MCNC: 3.4 MMOL/L — LOW (ref 3.5–5.3)
POTASSIUM SERPL-MCNC: 3.5 MMOL/L — SIGNIFICANT CHANGE UP (ref 3.5–5.3)
POTASSIUM SERPL-MCNC: 3.7 MMOL/L — SIGNIFICANT CHANGE UP (ref 3.5–5.3)
POTASSIUM SERPL-MCNC: 3.9 MMOL/L — SIGNIFICANT CHANGE UP (ref 3.5–5.3)
POTASSIUM SERPL-MCNC: 4 MMOL/L — SIGNIFICANT CHANGE UP (ref 3.5–5.3)
POTASSIUM SERPL-MCNC: 4.2 MMOL/L — SIGNIFICANT CHANGE UP (ref 3.5–5.3)
POTASSIUM SERPL-MCNC: 4.6 MMOL/L — SIGNIFICANT CHANGE UP (ref 3.5–5.3)
POTASSIUM SERPL-MCNC: 4.7 MMOL/L — SIGNIFICANT CHANGE UP (ref 3.5–5.3)
POTASSIUM SERPL-MCNC: 5.4 MMOL/L — HIGH (ref 3.5–5.3)
POTASSIUM SERPL-MCNC: 5.8 MMOL/L — HIGH (ref 3.5–5.3)
POTASSIUM SERPL-MCNC: SIGNIFICANT CHANGE UP MMOL/L (ref 3.5–5.3)
POTASSIUM SERPL-SCNC: 2.7 MMOL/L — CRITICAL LOW (ref 3.5–5.3)
POTASSIUM SERPL-SCNC: 3 MMOL/L — LOW (ref 3.5–5.3)
POTASSIUM SERPL-SCNC: 3.1 MMOL/L — LOW (ref 3.5–5.3)
POTASSIUM SERPL-SCNC: 3.4 MMOL/L — LOW (ref 3.5–5.3)
POTASSIUM SERPL-SCNC: 3.5 MMOL/L — SIGNIFICANT CHANGE UP (ref 3.5–5.3)
POTASSIUM SERPL-SCNC: 3.7 MMOL/L — SIGNIFICANT CHANGE UP (ref 3.5–5.3)
POTASSIUM SERPL-SCNC: 3.9 MMOL/L — SIGNIFICANT CHANGE UP (ref 3.5–5.3)
POTASSIUM SERPL-SCNC: 4 MMOL/L — SIGNIFICANT CHANGE UP (ref 3.5–5.3)
POTASSIUM SERPL-SCNC: 4.2 MMOL/L — SIGNIFICANT CHANGE UP (ref 3.5–5.3)
POTASSIUM SERPL-SCNC: 4.6 MMOL/L — SIGNIFICANT CHANGE UP (ref 3.5–5.3)
POTASSIUM SERPL-SCNC: 4.7 MMOL/L — SIGNIFICANT CHANGE UP (ref 3.5–5.3)
POTASSIUM SERPL-SCNC: 5.4 MMOL/L — HIGH (ref 3.5–5.3)
POTASSIUM SERPL-SCNC: 5.8 MMOL/L — HIGH (ref 3.5–5.3)
POTASSIUM SERPL-SCNC: SIGNIFICANT CHANGE UP MMOL/L (ref 3.5–5.3)
PROT ?TM UR-MCNC: 1191 MG/DL — HIGH (ref 0–12)
PROT ?TM UR-MCNC: 1483 MG/DL — HIGH (ref 0–12)
PROT ?TM UR-MCNC: 1557 MG/DL — HIGH (ref 0–12)
PROT ?TM UR-MCNC: 1557 MG/DL — HIGH (ref 0–12)
PROT ?TM UR-MCNC: 517 MG/DL — HIGH (ref 0–12)
PROT CSF-MCNC: 156 MG/DL — HIGH (ref 15–45)
PROT PATTERN 24H UR ELPH-IMP: SIGNIFICANT CHANGE UP
PROT PATTERN SERPL ELPH-IMP: SIGNIFICANT CHANGE UP
PROT SERPL-MCNC: 5.6 G/DL — LOW (ref 6–8.3)
PROT SERPL-MCNC: 5.8 G/DL — LOW (ref 6–8.3)
PROT SERPL-MCNC: 6.4 G/DL — SIGNIFICANT CHANGE UP (ref 6–8.3)
PROT SERPL-MCNC: 7 G/DL — SIGNIFICANT CHANGE UP (ref 6–8.3)
PROT SERPL-MCNC: 7 G/DL — SIGNIFICANT CHANGE UP (ref 6–8.3)
PROT SERPL-MCNC: 7.2 G/DL — SIGNIFICANT CHANGE UP (ref 6–8.3)
PROT SERPL-MCNC: 7.3 G/DL — SIGNIFICANT CHANGE UP (ref 6–8.3)
PROT SERPL-MCNC: 7.7 G/DL — SIGNIFICANT CHANGE UP (ref 6–8.3)
PROT SERPL-MCNC: 7.8 G/DL — SIGNIFICANT CHANGE UP (ref 6–8.3)
PROT SERPL-MCNC: 8.8 G/DL — HIGH (ref 6–8.3)
PROT UR-MCNC: >=300 MG/DL
PROT/CREAT UR-RTO: 24.6 RATIO — HIGH (ref 0–0.2)
PROTHROM AB SERPL-ACNC: 14.4 SEC — HIGH (ref 10.6–13.6)
PTH-INTACT FLD-MCNC: 277 PG/ML — HIGH (ref 15–65)
QUANT TB PLUS MITOGEN MINUS NIL: 0 IU/ML — SIGNIFICANT CHANGE UP
QUANT TB PLUS MITOGEN MINUS NIL: 0.01 IU/ML — SIGNIFICANT CHANGE UP
RBC # BLD: 2.7 M/UL — LOW (ref 4.2–5.8)
RBC # BLD: 2.97 M/UL — LOW (ref 4.2–5.8)
RBC # BLD: 3.22 M/UL — LOW (ref 4.2–5.8)
RBC # BLD: 3.27 M/UL — LOW (ref 4.2–5.8)
RBC # BLD: 3.49 M/UL — LOW (ref 4.2–5.8)
RBC # BLD: 3.57 M/UL — LOW (ref 4.2–5.8)
RBC # BLD: 3.62 M/UL — LOW (ref 4.2–5.8)
RBC # BLD: 3.83 M/UL — LOW (ref 4.2–5.8)
RBC # BLD: 3.88 M/UL — LOW (ref 4.2–5.8)
RBC # BLD: 3.89 M/UL — LOW (ref 4.2–5.8)
RBC # BLD: 4.55 M/UL — SIGNIFICANT CHANGE UP (ref 4.2–5.8)
RBC # CSF: 1283 /UL — HIGH (ref 0–0)
RBC # FLD: 17.8 % — HIGH (ref 10.3–14.5)
RBC # FLD: 18 % — HIGH (ref 10.3–14.5)
RBC # FLD: 18.1 % — HIGH (ref 10.3–14.5)
RBC # FLD: 18.4 % — HIGH (ref 10.3–14.5)
RBC # FLD: 18.4 % — HIGH (ref 10.3–14.5)
RBC # FLD: 18.5 % — HIGH (ref 10.3–14.5)
RBC # FLD: 18.6 % — HIGH (ref 10.3–14.5)
RBC # FLD: 19 % — HIGH (ref 10.3–14.5)
RBC # FLD: 19.7 % — HIGH (ref 10.3–14.5)
RBC BLD AUTO: ABNORMAL
RBC CASTS # UR COMP ASSIST: > 10 /HPF
RHEUMATOID FACT SERPL-ACNC: 11 IU/ML — SIGNIFICANT CHANGE UP (ref 0–13)
SAO2 % BLDA: 98 % — SIGNIFICANT CHANGE UP (ref 95–100)
SAO2 % BLDA: 99 % — SIGNIFICANT CHANGE UP (ref 95–100)
SAO2 % BLDV: 69 % — SIGNIFICANT CHANGE UP
SARS-COV-2 IGG SERPL QL IA: NEGATIVE — SIGNIFICANT CHANGE UP
SARS-COV-2 IGM SERPL IA-ACNC: 0.1 INDEX — SIGNIFICANT CHANGE UP
SARS-COV-2 RNA SPEC QL NAA+PROBE: SIGNIFICANT CHANGE UP
SODIUM SERPL-SCNC: 133 MMOL/L — LOW (ref 135–145)
SODIUM SERPL-SCNC: 136 MMOL/L — SIGNIFICANT CHANGE UP (ref 135–145)
SODIUM SERPL-SCNC: 137 MMOL/L — SIGNIFICANT CHANGE UP (ref 135–145)
SODIUM SERPL-SCNC: 138 MMOL/L — SIGNIFICANT CHANGE UP (ref 135–145)
SODIUM SERPL-SCNC: 138 MMOL/L — SIGNIFICANT CHANGE UP (ref 135–145)
SODIUM SERPL-SCNC: 139 MMOL/L — SIGNIFICANT CHANGE UP (ref 135–145)
SODIUM SERPL-SCNC: 140 MMOL/L — SIGNIFICANT CHANGE UP (ref 135–145)
SODIUM UR-SCNC: 46 MMOL/L — SIGNIFICANT CHANGE UP
SP GR SPEC: 1.02 — SIGNIFICANT CHANGE UP (ref 1–1.03)
SPECIMEN SOURCE FLD: SIGNIFICANT CHANGE UP
SPECIMEN SOURCE: SIGNIFICANT CHANGE UP
T3 SERPL-MCNC: 50 NG/DL — LOW (ref 80–200)
T4 AB SER-ACNC: 4.58 UG/DL — SIGNIFICANT CHANGE UP (ref 3.17–11.72)
TARGETS BLD QL SMEAR: SLIGHT — SIGNIFICANT CHANGE UP
THC UR QL: NEGATIVE — SIGNIFICANT CHANGE UP
TRIGL SERPL-MCNC: 354 MG/DL — HIGH (ref 10–149)
TRIGL SERPL-MCNC: 357 MG/DL — HIGH
TROPONIN T SERPL-MCNC: 0.23 NG/ML — CRITICAL HIGH (ref 0–0.01)
TROPONIN T SERPL-MCNC: 0.28 NG/ML — CRITICAL HIGH (ref 0–0.01)
TUBE TYPE: SIGNIFICANT CHANGE UP
URATE SERPL-MCNC: 0.8 MG/DL — LOW (ref 3.4–8.8)
URATE SERPL-MCNC: 1.2 MG/DL — LOW (ref 3.4–8.8)
URATE SERPL-MCNC: 1.4 MG/DL — LOW (ref 3.4–8.8)
URATE SERPL-MCNC: 24.9 MG/DL — HIGH (ref 3.4–8.8)
URATE SERPL-MCNC: 3.9 MG/DL — SIGNIFICANT CHANGE UP (ref 3.4–8.8)
UROBILINOGEN FLD QL: 0.2 E.U./DL — SIGNIFICANT CHANGE UP
VALPROATE SERPL-MCNC: 25.6 UG/ML — LOW (ref 50–100)
VALPROATE SERPL-MCNC: 46.2 UG/ML — LOW (ref 50–100)
VANCOMYCIN FLD-MCNC: 14 UG/ML — SIGNIFICANT CHANGE UP
VANCOMYCIN FLD-MCNC: <4 UG/ML — SIGNIFICANT CHANGE UP
VANCOMYCIN TROUGH SERPL-MCNC: 15.7 UG/ML — SIGNIFICANT CHANGE UP (ref 10–20)
VANCOMYCIN TROUGH SERPL-MCNC: 25.6 UG/ML — CRITICAL HIGH (ref 10–20)
VANCOMYCIN TROUGH SERPL-MCNC: 35.6 UG/ML — CRITICAL HIGH (ref 10–20)
VIT D25+D1,25 OH+D1,25 PNL SERPL-MCNC: 11.2 PG/ML — LOW (ref 19.9–79.3)
WBC # BLD: 10.03 K/UL — SIGNIFICANT CHANGE UP (ref 3.8–10.5)
WBC # BLD: 10.82 K/UL — HIGH (ref 3.8–10.5)
WBC # BLD: 6.27 K/UL — SIGNIFICANT CHANGE UP (ref 3.8–10.5)
WBC # BLD: 6.4 K/UL — SIGNIFICANT CHANGE UP (ref 3.8–10.5)
WBC # BLD: 6.55 K/UL — SIGNIFICANT CHANGE UP (ref 3.8–10.5)
WBC # BLD: 6.61 K/UL — SIGNIFICANT CHANGE UP (ref 3.8–10.5)
WBC # BLD: 7 K/UL — SIGNIFICANT CHANGE UP (ref 3.8–10.5)
WBC # BLD: 7.33 K/UL — SIGNIFICANT CHANGE UP (ref 3.8–10.5)
WBC # BLD: 7.53 K/UL — SIGNIFICANT CHANGE UP (ref 3.8–10.5)
WBC # BLD: 7.98 K/UL — SIGNIFICANT CHANGE UP (ref 3.8–10.5)
WBC # BLD: 9.7 K/UL — SIGNIFICANT CHANGE UP (ref 3.8–10.5)
WBC # FLD AUTO: 10.03 K/UL — SIGNIFICANT CHANGE UP (ref 3.8–10.5)
WBC # FLD AUTO: 10.82 K/UL — HIGH (ref 3.8–10.5)
WBC # FLD AUTO: 6.27 K/UL — SIGNIFICANT CHANGE UP (ref 3.8–10.5)
WBC # FLD AUTO: 6.4 K/UL — SIGNIFICANT CHANGE UP (ref 3.8–10.5)
WBC # FLD AUTO: 6.55 K/UL — SIGNIFICANT CHANGE UP (ref 3.8–10.5)
WBC # FLD AUTO: 6.61 K/UL — SIGNIFICANT CHANGE UP (ref 3.8–10.5)
WBC # FLD AUTO: 7 K/UL — SIGNIFICANT CHANGE UP (ref 3.8–10.5)
WBC # FLD AUTO: 7.33 K/UL — SIGNIFICANT CHANGE UP (ref 3.8–10.5)
WBC # FLD AUTO: 7.53 K/UL — SIGNIFICANT CHANGE UP (ref 3.8–10.5)
WBC # FLD AUTO: 7.98 K/UL — SIGNIFICANT CHANGE UP (ref 3.8–10.5)
WBC # FLD AUTO: 9.7 K/UL — SIGNIFICANT CHANGE UP (ref 3.8–10.5)
WBC UR QL: ABNORMAL /HPF

## 2020-01-01 PROCEDURE — 93010 ELECTROCARDIOGRAM REPORT: CPT

## 2020-01-01 PROCEDURE — 99233 SBSQ HOSP IP/OBS HIGH 50: CPT

## 2020-01-01 PROCEDURE — 99233 SBSQ HOSP IP/OBS HIGH 50: CPT | Mod: GC

## 2020-01-01 PROCEDURE — 76937 US GUIDE VASCULAR ACCESS: CPT | Mod: 26

## 2020-01-01 PROCEDURE — 99291 CRITICAL CARE FIRST HOUR: CPT

## 2020-01-01 PROCEDURE — 99232 SBSQ HOSP IP/OBS MODERATE 35: CPT

## 2020-01-01 PROCEDURE — 71045 X-RAY EXAM CHEST 1 VIEW: CPT | Mod: 26

## 2020-01-01 PROCEDURE — 36000 PLACE NEEDLE IN VEIN: CPT | Mod: 59

## 2020-01-01 PROCEDURE — 36556 INSERT NON-TUNNEL CV CATH: CPT

## 2020-01-01 PROCEDURE — 93971 EXTREMITY STUDY: CPT | Mod: 26,LT

## 2020-01-01 PROCEDURE — 99222 1ST HOSP IP/OBS MODERATE 55: CPT

## 2020-01-01 PROCEDURE — 74177 CT ABD & PELVIS W/CONTRAST: CPT | Mod: 26

## 2020-01-01 PROCEDURE — 99497 ADVNCD CARE PLAN 30 MIN: CPT | Mod: 25

## 2020-01-01 PROCEDURE — 99291 CRITICAL CARE FIRST HOUR: CPT | Mod: CS

## 2020-01-01 PROCEDURE — 95720 EEG PHY/QHP EA INCR W/VEEG: CPT

## 2020-01-01 PROCEDURE — 71045 X-RAY EXAM CHEST 1 VIEW: CPT | Mod: 26,77

## 2020-01-01 PROCEDURE — 70450 CT HEAD/BRAIN W/O DYE: CPT | Mod: 26

## 2020-01-01 PROCEDURE — 99223 1ST HOSP IP/OBS HIGH 75: CPT

## 2020-01-01 PROCEDURE — 74018 RADEX ABDOMEN 1 VIEW: CPT | Mod: 26

## 2020-01-01 PROCEDURE — 93975 VASCULAR STUDY: CPT | Mod: 26

## 2020-01-01 PROCEDURE — 71260 CT THORAX DX C+: CPT | Mod: 26

## 2020-01-01 PROCEDURE — ZZZZZ: CPT

## 2020-01-01 PROCEDURE — 76775 US EXAM ABDO BACK WALL LIM: CPT | Mod: 26

## 2020-01-01 PROCEDURE — 76937 US GUIDE VASCULAR ACCESS: CPT | Mod: 26,59

## 2020-01-01 PROCEDURE — 93306 TTE W/DOPPLER COMPLETE: CPT | Mod: 26

## 2020-01-01 PROCEDURE — 77001 FLUOROGUIDE FOR VEIN DEVICE: CPT | Mod: 26

## 2020-01-01 RX ORDER — HALOPERIDOL DECANOATE 100 MG/ML
5 INJECTION INTRAMUSCULAR ONCE
Refills: 0 | Status: COMPLETED | OUTPATIENT
Start: 2020-01-01 | End: 2020-01-01

## 2020-01-01 RX ORDER — CEFTRIAXONE 500 MG/1
2000 INJECTION, POWDER, FOR SOLUTION INTRAMUSCULAR; INTRAVENOUS EVERY 12 HOURS
Refills: 0 | Status: DISCONTINUED | OUTPATIENT
Start: 2020-01-01 | End: 2020-01-01

## 2020-01-01 RX ORDER — GLUCAGON INJECTION, SOLUTION 0.5 MG/.1ML
1 INJECTION, SOLUTION SUBCUTANEOUS ONCE
Refills: 0 | Status: DISCONTINUED | OUTPATIENT
Start: 2020-01-01 | End: 2020-01-01

## 2020-01-01 RX ORDER — CALCIUM GLUCONATE 100 MG/ML
1 VIAL (ML) INTRAVENOUS ONCE
Refills: 0 | Status: DISCONTINUED | OUTPATIENT
Start: 2020-01-01 | End: 2020-01-01

## 2020-01-01 RX ORDER — PIPERACILLIN AND TAZOBACTAM 4; .5 G/20ML; G/20ML
3.38 INJECTION, POWDER, LYOPHILIZED, FOR SOLUTION INTRAVENOUS ONCE
Refills: 0 | Status: COMPLETED | OUTPATIENT
Start: 2020-01-01 | End: 2020-01-01

## 2020-01-01 RX ORDER — LEVETIRACETAM 250 MG/1
1000 TABLET, FILM COATED ORAL EVERY 12 HOURS
Refills: 0 | Status: DISCONTINUED | OUTPATIENT
Start: 2020-01-01 | End: 2020-01-01

## 2020-01-01 RX ORDER — POTASSIUM CHLORIDE 20 MEQ
40 PACKET (EA) ORAL ONCE
Refills: 0 | Status: COMPLETED | OUTPATIENT
Start: 2020-01-01 | End: 2020-01-01

## 2020-01-01 RX ORDER — METHADONE HYDROCHLORIDE 40 MG/1
90 TABLET ORAL DAILY
Refills: 0 | Status: DISCONTINUED | OUTPATIENT
Start: 2020-01-01 | End: 2020-01-01

## 2020-01-01 RX ORDER — PROPOFOL 10 MG/ML
10 INJECTION, EMULSION INTRAVENOUS
Qty: 1000 | Refills: 0 | Status: DISCONTINUED | OUTPATIENT
Start: 2020-01-01 | End: 2020-01-01

## 2020-01-01 RX ORDER — MIDAZOLAM HYDROCHLORIDE 1 MG/ML
0.24 INJECTION, SOLUTION INTRAMUSCULAR; INTRAVENOUS
Qty: 100 | Refills: 0 | Status: DISCONTINUED | OUTPATIENT
Start: 2020-01-01 | End: 2020-01-01

## 2020-01-01 RX ORDER — ACETAMINOPHEN 500 MG
1000 TABLET ORAL ONCE
Refills: 0 | Status: COMPLETED | OUTPATIENT
Start: 2020-01-01 | End: 2020-01-01

## 2020-01-01 RX ORDER — DEXTROSE 50 % IN WATER 50 %
50 SYRINGE (ML) INTRAVENOUS ONCE
Refills: 0 | Status: DISCONTINUED | OUTPATIENT
Start: 2020-01-01 | End: 2020-01-01

## 2020-01-01 RX ORDER — INFLUENZA VIRUS VACCINE 15; 15; 15; 15 UG/.5ML; UG/.5ML; UG/.5ML; UG/.5ML
0.5 SUSPENSION INTRAMUSCULAR ONCE
Refills: 0 | Status: DISCONTINUED | OUTPATIENT
Start: 2020-01-01 | End: 2020-01-01

## 2020-01-01 RX ORDER — IOHEXOL 300 MG/ML
30 INJECTION, SOLUTION INTRAVENOUS ONCE
Refills: 0 | Status: COMPLETED | OUTPATIENT
Start: 2020-01-01 | End: 2020-01-01

## 2020-01-01 RX ORDER — ACYCLOVIR SODIUM 500 MG
VIAL (EA) INTRAVENOUS
Refills: 0 | Status: DISCONTINUED | OUTPATIENT
Start: 2020-01-01 | End: 2020-01-01

## 2020-01-01 RX ORDER — SODIUM ZIRCONIUM CYCLOSILICATE 10 G/10G
10 POWDER, FOR SUSPENSION ORAL ONCE
Refills: 0 | Status: COMPLETED | OUTPATIENT
Start: 2020-01-01 | End: 2020-01-01

## 2020-01-01 RX ORDER — VANCOMYCIN HCL 1 G
1000 VIAL (EA) INTRAVENOUS ONCE
Refills: 0 | Status: COMPLETED | OUTPATIENT
Start: 2020-01-01 | End: 2020-01-01

## 2020-01-01 RX ORDER — HEPARIN SODIUM 5000 [USP'U]/ML
5000 INJECTION INTRAVENOUS; SUBCUTANEOUS EVERY 8 HOURS
Refills: 0 | Status: DISCONTINUED | OUTPATIENT
Start: 2020-01-01 | End: 2020-01-01

## 2020-01-01 RX ORDER — PHENOBARBITAL 60 MG
120 TABLET ORAL ONCE
Refills: 0 | Status: DISCONTINUED | OUTPATIENT
Start: 2020-01-01 | End: 2020-01-01

## 2020-01-01 RX ORDER — RASBURICASE 7.5 MG
7.5 KIT INTRAVENOUS ONCE
Refills: 0 | Status: DISCONTINUED | OUTPATIENT
Start: 2020-01-01 | End: 2020-01-01

## 2020-01-01 RX ORDER — ACETAMINOPHEN 500 MG
680 TABLET ORAL ONCE
Refills: 0 | Status: COMPLETED | OUTPATIENT
Start: 2020-01-01 | End: 2020-01-01

## 2020-01-01 RX ORDER — HEPARIN SODIUM 5000 [USP'U]/ML
5000 INJECTION INTRAVENOUS; SUBCUTANEOUS EVERY 12 HOURS
Refills: 0 | Status: DISCONTINUED | OUTPATIENT
Start: 2020-01-01 | End: 2020-01-01

## 2020-01-01 RX ORDER — NOREPINEPHRINE BITARTRATE/D5W 8 MG/250ML
0.05 PLASTIC BAG, INJECTION (ML) INTRAVENOUS
Qty: 8 | Refills: 0 | Status: DISCONTINUED | OUTPATIENT
Start: 2020-01-01 | End: 2020-01-01

## 2020-01-01 RX ORDER — HYDROMORPHONE HYDROCHLORIDE 2 MG/ML
2 INJECTION INTRAMUSCULAR; INTRAVENOUS; SUBCUTANEOUS ONCE
Refills: 0 | Status: DISCONTINUED | OUTPATIENT
Start: 2020-01-01 | End: 2020-01-01

## 2020-01-01 RX ORDER — VALPROIC ACID (AS SODIUM SALT) 250 MG/5ML
1000 SOLUTION, ORAL ORAL ONCE
Refills: 0 | Status: COMPLETED | OUTPATIENT
Start: 2020-01-01 | End: 2020-01-01

## 2020-01-01 RX ORDER — DEXTROSE 50 % IN WATER 50 %
50 SYRINGE (ML) INTRAVENOUS ONCE
Refills: 0 | Status: COMPLETED | OUTPATIENT
Start: 2020-01-01 | End: 2020-01-01

## 2020-01-01 RX ORDER — ONDANSETRON 8 MG/1
4 TABLET, FILM COATED ORAL ONCE
Refills: 0 | Status: COMPLETED | OUTPATIENT
Start: 2020-01-01 | End: 2020-01-01

## 2020-01-01 RX ORDER — DEXTROSE 50 % IN WATER 50 %
25 SYRINGE (ML) INTRAVENOUS ONCE
Refills: 0 | Status: DISCONTINUED | OUTPATIENT
Start: 2020-01-01 | End: 2020-01-01

## 2020-01-01 RX ORDER — LACOSAMIDE 50 MG/1
100 TABLET ORAL EVERY 12 HOURS
Refills: 0 | Status: DISCONTINUED | OUTPATIENT
Start: 2020-01-01 | End: 2020-01-01

## 2020-01-01 RX ORDER — SODIUM CHLORIDE 9 MG/ML
1000 INJECTION INTRAMUSCULAR; INTRAVENOUS; SUBCUTANEOUS ONCE
Refills: 0 | Status: COMPLETED | OUTPATIENT
Start: 2020-01-01 | End: 2020-01-01

## 2020-01-01 RX ORDER — MAGNESIUM SULFATE 500 MG/ML
4 VIAL (ML) INJECTION ONCE
Refills: 0 | Status: COMPLETED | OUTPATIENT
Start: 2020-01-01 | End: 2020-01-01

## 2020-01-01 RX ORDER — PHENOBARBITAL 60 MG
65 TABLET ORAL EVERY 12 HOURS
Refills: 0 | Status: DISCONTINUED | OUTPATIENT
Start: 2020-01-01 | End: 2020-01-01

## 2020-01-01 RX ORDER — SODIUM CHLORIDE 9 MG/ML
500 INJECTION INTRAMUSCULAR; INTRAVENOUS; SUBCUTANEOUS ONCE
Refills: 0 | Status: COMPLETED | OUTPATIENT
Start: 2020-01-01 | End: 2020-01-01

## 2020-01-01 RX ORDER — PROPOFOL 10 MG/ML
30 INJECTION, EMULSION INTRAVENOUS
Qty: 500 | Refills: 0 | Status: DISCONTINUED | OUTPATIENT
Start: 2020-01-01 | End: 2020-01-01

## 2020-01-01 RX ORDER — PERMETHRIN CREAM 5% W/W 50 MG/G
1 CREAM TOPICAL ONCE
Refills: 0 | Status: COMPLETED | OUTPATIENT
Start: 2020-01-01 | End: 2020-01-01

## 2020-01-01 RX ORDER — DEXTROSE 10 % IN WATER 10 %
1000 INTRAVENOUS SOLUTION INTRAVENOUS
Refills: 0 | Status: DISCONTINUED | OUTPATIENT
Start: 2020-01-01 | End: 2020-01-01

## 2020-01-01 RX ORDER — INSULIN LISPRO 100/ML
VIAL (ML) SUBCUTANEOUS EVERY 6 HOURS
Refills: 0 | Status: DISCONTINUED | OUTPATIENT
Start: 2020-01-01 | End: 2020-01-01

## 2020-01-01 RX ORDER — PHENOBARBITAL 60 MG
65 TABLET ORAL ONCE
Refills: 0 | Status: DISCONTINUED | OUTPATIENT
Start: 2020-01-01 | End: 2020-01-01

## 2020-01-01 RX ORDER — KETAMINE HYDROCHLORIDE 100 MG/ML
0.5 INJECTION INTRAMUSCULAR; INTRAVENOUS
Qty: 2000 | Refills: 0 | Status: DISCONTINUED | OUTPATIENT
Start: 2020-01-01 | End: 2020-01-01

## 2020-01-01 RX ORDER — SODIUM BICARBONATE 1 MEQ/ML
50 SYRINGE (ML) INTRAVENOUS ONCE
Refills: 0 | Status: DISCONTINUED | OUTPATIENT
Start: 2020-01-01 | End: 2020-01-01

## 2020-01-01 RX ORDER — VANCOMYCIN HCL 1 G
1000 VIAL (EA) INTRAVENOUS ONCE
Refills: 0 | Status: DISCONTINUED | OUTPATIENT
Start: 2020-01-01 | End: 2020-01-01

## 2020-01-01 RX ORDER — MIDAZOLAM HYDROCHLORIDE 1 MG/ML
0.61 INJECTION, SOLUTION INTRAMUSCULAR; INTRAVENOUS
Qty: 100 | Refills: 0 | Status: DISCONTINUED | OUTPATIENT
Start: 2020-01-01 | End: 2020-01-01

## 2020-01-01 RX ORDER — MIDAZOLAM HYDROCHLORIDE 1 MG/ML
0.02 INJECTION, SOLUTION INTRAMUSCULAR; INTRAVENOUS
Qty: 100 | Refills: 0 | Status: DISCONTINUED | OUTPATIENT
Start: 2020-01-01 | End: 2020-01-01

## 2020-01-01 RX ORDER — SODIUM CHLORIDE 9 MG/ML
1000 INJECTION INTRAMUSCULAR; INTRAVENOUS; SUBCUTANEOUS ONCE
Refills: 0 | Status: DISCONTINUED | OUTPATIENT
Start: 2020-01-01 | End: 2020-01-01

## 2020-01-01 RX ORDER — VALPROIC ACID (AS SODIUM SALT) 250 MG/5ML
500 SOLUTION, ORAL ORAL EVERY 12 HOURS
Refills: 0 | Status: DISCONTINUED | OUTPATIENT
Start: 2020-01-01 | End: 2020-01-01

## 2020-01-01 RX ORDER — MIDAZOLAM HYDROCHLORIDE 1 MG/ML
10 INJECTION, SOLUTION INTRAMUSCULAR; INTRAVENOUS ONCE
Refills: 0 | Status: DISCONTINUED | OUTPATIENT
Start: 2020-01-01 | End: 2020-01-01

## 2020-01-01 RX ORDER — NOREPINEPHRINE BITARTRATE/D5W 8 MG/250ML
0.05 PLASTIC BAG, INJECTION (ML) INTRAVENOUS
Qty: 16 | Refills: 0 | Status: DISCONTINUED | OUTPATIENT
Start: 2020-01-01 | End: 2020-01-01

## 2020-01-01 RX ORDER — LEVETIRACETAM 250 MG/1
1000 TABLET, FILM COATED ORAL EVERY 24 HOURS
Refills: 0 | Status: DISCONTINUED | OUTPATIENT
Start: 2020-01-01 | End: 2020-01-01

## 2020-01-01 RX ORDER — FOSPHENYTOIN 50 MG/ML
800 INJECTION INTRAMUSCULAR; INTRAVENOUS ONCE
Refills: 0 | Status: COMPLETED | OUTPATIENT
Start: 2020-01-01 | End: 2020-01-01

## 2020-01-01 RX ORDER — METOCLOPRAMIDE HCL 10 MG
10 TABLET ORAL ONCE
Refills: 0 | Status: COMPLETED | OUTPATIENT
Start: 2020-01-01 | End: 2020-01-01

## 2020-01-01 RX ORDER — ALBUMIN HUMAN 25 %
50 VIAL (ML) INTRAVENOUS
Refills: 0 | Status: COMPLETED | OUTPATIENT
Start: 2020-01-01 | End: 2020-01-01

## 2020-01-01 RX ORDER — KETAMINE HYDROCHLORIDE 100 MG/ML
0.1 INJECTION INTRAMUSCULAR; INTRAVENOUS
Qty: 2000 | Refills: 0 | Status: DISCONTINUED | OUTPATIENT
Start: 2020-01-01 | End: 2020-01-01

## 2020-01-01 RX ORDER — ACETAMINOPHEN 500 MG
1000 TABLET ORAL ONCE
Refills: 0 | Status: DISCONTINUED | OUTPATIENT
Start: 2020-01-01 | End: 2020-01-01

## 2020-01-01 RX ORDER — HALOPERIDOL DECANOATE 100 MG/ML
5 INJECTION INTRAMUSCULAR ONCE
Refills: 0 | Status: DISCONTINUED | OUTPATIENT
Start: 2020-01-01 | End: 2020-01-01

## 2020-01-01 RX ORDER — DESMOPRESSIN ACETATE 0.1 MG/1
14 TABLET ORAL ONCE
Refills: 0 | Status: COMPLETED | OUTPATIENT
Start: 2020-01-01 | End: 2020-01-01

## 2020-01-01 RX ORDER — VANCOMYCIN HCL 1 G
125 VIAL (EA) INTRAVENOUS EVERY 6 HOURS
Refills: 0 | Status: DISCONTINUED | OUTPATIENT
Start: 2020-01-01 | End: 2020-01-01

## 2020-01-01 RX ORDER — SODIUM CHLORIDE 9 MG/ML
500 INJECTION, SOLUTION INTRAVENOUS ONCE
Refills: 0 | Status: COMPLETED | OUTPATIENT
Start: 2020-01-01 | End: 2020-01-01

## 2020-01-01 RX ORDER — DEXTROSE 50 % IN WATER 50 %
25 SYRINGE (ML) INTRAVENOUS ONCE
Refills: 0 | Status: COMPLETED | OUTPATIENT
Start: 2020-01-01 | End: 2020-01-01

## 2020-01-01 RX ORDER — ONDANSETRON 8 MG/1
4 TABLET, FILM COATED ORAL EVERY 6 HOURS
Refills: 0 | Status: DISCONTINUED | OUTPATIENT
Start: 2020-01-01 | End: 2020-01-01

## 2020-01-01 RX ORDER — PANTOPRAZOLE SODIUM 20 MG/1
40 TABLET, DELAYED RELEASE ORAL DAILY
Refills: 0 | Status: DISCONTINUED | OUTPATIENT
Start: 2020-01-01 | End: 2020-01-01

## 2020-01-01 RX ORDER — DEXTROSE 50 % IN WATER 50 %
15 SYRINGE (ML) INTRAVENOUS ONCE
Refills: 0 | Status: DISCONTINUED | OUTPATIENT
Start: 2020-01-01 | End: 2020-01-01

## 2020-01-01 RX ORDER — SODIUM CHLORIDE 9 MG/ML
1000 INJECTION, SOLUTION INTRAVENOUS
Refills: 0 | Status: DISCONTINUED | OUTPATIENT
Start: 2020-01-01 | End: 2020-01-01

## 2020-01-01 RX ORDER — SODIUM CHLORIDE 9 MG/ML
1000 INJECTION INTRAMUSCULAR; INTRAVENOUS; SUBCUTANEOUS
Refills: 0 | Status: DISCONTINUED | OUTPATIENT
Start: 2020-01-01 | End: 2020-01-01

## 2020-01-01 RX ORDER — MIDAZOLAM HYDROCHLORIDE 1 MG/ML
5 INJECTION, SOLUTION INTRAMUSCULAR; INTRAVENOUS ONCE
Refills: 0 | Status: DISCONTINUED | OUTPATIENT
Start: 2020-01-01 | End: 2020-01-01

## 2020-01-01 RX ORDER — HEPARIN SODIUM 5000 [USP'U]/ML
5000 INJECTION INTRAVENOUS; SUBCUTANEOUS ONCE
Refills: 0 | Status: DISCONTINUED | OUTPATIENT
Start: 2020-01-01 | End: 2020-01-01

## 2020-01-01 RX ORDER — METHADONE HYDROCHLORIDE 40 MG/1
10 TABLET ORAL ONCE
Refills: 0 | Status: DISCONTINUED | OUTPATIENT
Start: 2020-01-01 | End: 2020-01-01

## 2020-01-01 RX ORDER — PROPOFOL 10 MG/ML
50 INJECTION, EMULSION INTRAVENOUS
Qty: 1000 | Refills: 0 | Status: DISCONTINUED | OUTPATIENT
Start: 2020-01-01 | End: 2020-01-01

## 2020-01-01 RX ORDER — MIDAZOLAM HYDROCHLORIDE 1 MG/ML
0.88 INJECTION, SOLUTION INTRAMUSCULAR; INTRAVENOUS
Qty: 1250 | Refills: 0 | Status: DISCONTINUED | OUTPATIENT
Start: 2020-01-01 | End: 2020-01-01

## 2020-01-01 RX ORDER — FENTANYL CITRATE 50 UG/ML
50 INJECTION INTRAVENOUS ONCE
Refills: 0 | Status: DISCONTINUED | OUTPATIENT
Start: 2020-01-01 | End: 2020-01-01

## 2020-01-01 RX ORDER — KETAMINE HYDROCHLORIDE 100 MG/ML
50 INJECTION INTRAMUSCULAR; INTRAVENOUS ONCE
Refills: 0 | Status: DISCONTINUED | OUTPATIENT
Start: 2020-01-01 | End: 2020-01-01

## 2020-01-01 RX ORDER — CHLORHEXIDINE GLUCONATE 213 G/1000ML
1 SOLUTION TOPICAL
Refills: 0 | Status: DISCONTINUED | OUTPATIENT
Start: 2020-01-01 | End: 2020-01-01

## 2020-01-01 RX ORDER — ACETAMINOPHEN 500 MG
650 TABLET ORAL EVERY 6 HOURS
Refills: 0 | Status: DISCONTINUED | OUTPATIENT
Start: 2020-01-01 | End: 2020-01-01

## 2020-01-01 RX ORDER — DESMOPRESSIN ACETATE 0.1 MG/1
13.5 TABLET ORAL ONCE
Refills: 0 | Status: DISCONTINUED | OUTPATIENT
Start: 2020-01-01 | End: 2020-01-01

## 2020-01-01 RX ORDER — LACOSAMIDE 50 MG/1
200 TABLET ORAL EVERY 12 HOURS
Refills: 0 | Status: DISCONTINUED | OUTPATIENT
Start: 2020-01-01 | End: 2020-01-01

## 2020-01-01 RX ORDER — INSULIN HUMAN 100 [IU]/ML
10 INJECTION, SOLUTION SUBCUTANEOUS ONCE
Refills: 0 | Status: DISCONTINUED | OUTPATIENT
Start: 2020-01-01 | End: 2020-01-01

## 2020-01-01 RX ORDER — RASBURICASE 7.5 MG
6 KIT INTRAVENOUS ONCE
Refills: 0 | Status: COMPLETED | OUTPATIENT
Start: 2020-01-01 | End: 2020-01-01

## 2020-01-01 RX ORDER — FENTANYL CITRATE 50 UG/ML
100 INJECTION INTRAVENOUS ONCE
Refills: 0 | Status: DISCONTINUED | OUTPATIENT
Start: 2020-01-01 | End: 2020-01-01

## 2020-01-01 RX ORDER — PHENOBARBITAL 60 MG
65 TABLET ORAL EVERY 6 HOURS
Refills: 0 | Status: DISCONTINUED | OUTPATIENT
Start: 2020-01-01 | End: 2020-01-01

## 2020-01-01 RX ORDER — METOCLOPRAMIDE HCL 10 MG
5 TABLET ORAL EVERY 6 HOURS
Refills: 0 | Status: DISCONTINUED | OUTPATIENT
Start: 2020-01-01 | End: 2020-01-01

## 2020-01-01 RX ORDER — HALOPERIDOL DECANOATE 100 MG/ML
10 INJECTION INTRAMUSCULAR ONCE
Refills: 0 | Status: DISCONTINUED | OUTPATIENT
Start: 2020-01-01 | End: 2020-01-01

## 2020-01-01 RX ORDER — METOCLOPRAMIDE HCL 10 MG
10 TABLET ORAL ONCE
Refills: 0 | Status: DISCONTINUED | OUTPATIENT
Start: 2020-01-01 | End: 2020-01-01

## 2020-01-01 RX ORDER — PHENOBARBITAL 60 MG
400 TABLET ORAL ONCE
Refills: 0 | Status: DISCONTINUED | OUTPATIENT
Start: 2020-01-01 | End: 2020-01-01

## 2020-01-01 RX ORDER — PIPERACILLIN AND TAZOBACTAM 4; .5 G/20ML; G/20ML
2.25 INJECTION, POWDER, LYOPHILIZED, FOR SOLUTION INTRAVENOUS EVERY 8 HOURS
Refills: 0 | Status: DISCONTINUED | OUTPATIENT
Start: 2020-01-01 | End: 2020-01-01

## 2020-01-01 RX ORDER — POTASSIUM CHLORIDE 20 MEQ
10 PACKET (EA) ORAL
Refills: 0 | Status: COMPLETED | OUTPATIENT
Start: 2020-01-01 | End: 2020-01-01

## 2020-01-01 RX ORDER — ACYCLOVIR SODIUM 500 MG
200 VIAL (EA) INTRAVENOUS EVERY 24 HOURS
Refills: 0 | Status: DISCONTINUED | OUTPATIENT
Start: 2020-01-01 | End: 2020-01-01

## 2020-01-01 RX ORDER — PHENOBARBITAL 60 MG
600 TABLET ORAL ONCE
Refills: 0 | Status: DISCONTINUED | OUTPATIENT
Start: 2020-01-01 | End: 2020-01-01

## 2020-01-01 RX ORDER — MIDAZOLAM HYDROCHLORIDE 1 MG/ML
0.05 INJECTION, SOLUTION INTRAMUSCULAR; INTRAVENOUS
Qty: 100 | Refills: 0 | Status: DISCONTINUED | OUTPATIENT
Start: 2020-01-01 | End: 2020-01-01

## 2020-01-01 RX ORDER — PROPOFOL 10 MG/ML
60 INJECTION, EMULSION INTRAVENOUS
Qty: 1000 | Refills: 0 | Status: DISCONTINUED | OUTPATIENT
Start: 2020-01-01 | End: 2020-01-01

## 2020-01-01 RX ORDER — KETAMINE HYDROCHLORIDE 100 MG/ML
1 INJECTION INTRAMUSCULAR; INTRAVENOUS
Qty: 2000 | Refills: 0 | Status: DISCONTINUED | OUTPATIENT
Start: 2020-01-01 | End: 2020-01-01

## 2020-01-01 RX ORDER — FENTANYL CITRATE 50 UG/ML
0.5 INJECTION INTRAVENOUS
Qty: 2500 | Refills: 0 | Status: DISCONTINUED | OUTPATIENT
Start: 2020-01-01 | End: 2020-01-01

## 2020-01-01 RX ORDER — METOCLOPRAMIDE HCL 10 MG
10 TABLET ORAL EVERY 6 HOURS
Refills: 0 | Status: DISCONTINUED | OUTPATIENT
Start: 2020-01-01 | End: 2020-01-01

## 2020-01-01 RX ORDER — MIDAZOLAM HYDROCHLORIDE 1 MG/ML
2 INJECTION, SOLUTION INTRAMUSCULAR; INTRAVENOUS ONCE
Refills: 0 | Status: DISCONTINUED | OUTPATIENT
Start: 2020-01-01 | End: 2020-01-01

## 2020-01-01 RX ORDER — LACOSAMIDE 50 MG/1
100 TABLET ORAL ONCE
Refills: 0 | Status: DISCONTINUED | OUTPATIENT
Start: 2020-01-01 | End: 2020-01-01

## 2020-01-01 RX ORDER — FAMOTIDINE 10 MG/ML
20 INJECTION INTRAVENOUS ONCE
Refills: 0 | Status: COMPLETED | OUTPATIENT
Start: 2020-01-01 | End: 2020-01-01

## 2020-01-01 RX ORDER — VANCOMYCIN HCL 1 G
VIAL (EA) INTRAVENOUS
Refills: 0 | Status: DISCONTINUED | OUTPATIENT
Start: 2020-01-01 | End: 2020-01-01

## 2020-01-01 RX ORDER — ACETAMINOPHEN 500 MG
975 TABLET ORAL ONCE
Refills: 0 | Status: COMPLETED | OUTPATIENT
Start: 2020-01-01 | End: 2020-01-01

## 2020-01-01 RX ORDER — MIDAZOLAM HYDROCHLORIDE 1 MG/ML
0.49 INJECTION, SOLUTION INTRAMUSCULAR; INTRAVENOUS
Qty: 100 | Refills: 0 | Status: DISCONTINUED | OUTPATIENT
Start: 2020-01-01 | End: 2020-01-01

## 2020-01-01 RX ORDER — DEXTROSE 50 % IN WATER 50 %
12.5 SYRINGE (ML) INTRAVENOUS ONCE
Refills: 0 | Status: DISCONTINUED | OUTPATIENT
Start: 2020-01-01 | End: 2020-01-01

## 2020-01-01 RX ORDER — VALPROIC ACID (AS SODIUM SALT) 250 MG/5ML
1000 SOLUTION, ORAL ORAL EVERY 12 HOURS
Refills: 0 | Status: DISCONTINUED | OUTPATIENT
Start: 2020-01-01 | End: 2020-01-01

## 2020-01-01 RX ORDER — CHLORHEXIDINE GLUCONATE 213 G/1000ML
15 SOLUTION TOPICAL EVERY 12 HOURS
Refills: 0 | Status: DISCONTINUED | OUTPATIENT
Start: 2020-01-01 | End: 2020-01-01

## 2020-01-01 RX ORDER — CEFTRIAXONE 500 MG/1
2000 INJECTION, POWDER, FOR SOLUTION INTRAMUSCULAR; INTRAVENOUS EVERY 24 HOURS
Refills: 0 | Status: DISCONTINUED | OUTPATIENT
Start: 2020-01-01 | End: 2020-01-01

## 2020-01-01 RX ORDER — THIAMINE MONONITRATE (VIT B1) 100 MG
500 TABLET ORAL ONCE
Refills: 0 | Status: COMPLETED | OUTPATIENT
Start: 2020-01-01 | End: 2020-01-01

## 2020-01-01 RX ADMIN — HEPARIN SODIUM 5000 UNIT(S): 5000 INJECTION INTRAVENOUS; SUBCUTANEOUS at 06:45

## 2020-01-01 RX ADMIN — Medication 27.5 MILLIGRAM(S): at 19:13

## 2020-01-01 RX ADMIN — SODIUM CHLORIDE 60 MILLILITER(S): 9 INJECTION INTRAMUSCULAR; INTRAVENOUS; SUBCUTANEOUS at 18:52

## 2020-01-01 RX ADMIN — Medication 125 MILLIGRAM(S): at 02:07

## 2020-01-01 RX ADMIN — Medication 30 MILLIGRAM(S): at 17:05

## 2020-01-01 RX ADMIN — HALOPERIDOL DECANOATE 5 MILLIGRAM(S): 100 INJECTION INTRAMUSCULAR at 20:58

## 2020-01-01 RX ADMIN — HEPARIN SODIUM 5000 UNIT(S): 5000 INJECTION INTRAVENOUS; SUBCUTANEOUS at 05:47

## 2020-01-01 RX ADMIN — SODIUM CHLORIDE 100 MILLILITER(S): 9 INJECTION INTRAMUSCULAR; INTRAVENOUS; SUBCUTANEOUS at 06:22

## 2020-01-01 RX ADMIN — PROPOFOL 2.45 MICROGRAM(S)/KG/MIN: 10 INJECTION, EMULSION INTRAVENOUS at 05:08

## 2020-01-01 RX ADMIN — PIPERACILLIN AND TAZOBACTAM 200 GRAM(S): 4; .5 INJECTION, POWDER, LYOPHILIZED, FOR SOLUTION INTRAVENOUS at 18:15

## 2020-01-01 RX ADMIN — HEPARIN SODIUM 5000 UNIT(S): 5000 INJECTION INTRAVENOUS; SUBCUTANEOUS at 18:45

## 2020-01-01 RX ADMIN — ONDANSETRON 4 MILLIGRAM(S): 8 TABLET, FILM COATED ORAL at 10:47

## 2020-01-01 RX ADMIN — KETAMINE HYDROCHLORIDE 50 MILLIGRAM(S): 100 INJECTION INTRAMUSCULAR; INTRAVENOUS at 18:16

## 2020-01-01 RX ADMIN — PROPOFOL 2.72 MICROGRAM(S)/KG/MIN: 10 INJECTION, EMULSION INTRAVENOUS at 20:28

## 2020-01-01 RX ADMIN — Medication 1 TABLET(S): at 11:39

## 2020-01-01 RX ADMIN — ONDANSETRON 4 MILLIGRAM(S): 8 TABLET, FILM COATED ORAL at 09:38

## 2020-01-01 RX ADMIN — Medication 10 MILLIGRAM(S): at 13:37

## 2020-01-01 RX ADMIN — ONDANSETRON 4 MILLIGRAM(S): 8 TABLET, FILM COATED ORAL at 05:59

## 2020-01-01 RX ADMIN — Medication 65 MILLIGRAM(S): at 18:11

## 2020-01-01 RX ADMIN — PROPOFOL 14.7 MICROGRAM(S)/KG/MIN: 10 INJECTION, EMULSION INTRAVENOUS at 11:58

## 2020-01-01 RX ADMIN — Medication 105 MILLIGRAM(S): at 21:29

## 2020-01-01 RX ADMIN — Medication 1 TABLET(S): at 20:11

## 2020-01-01 RX ADMIN — Medication 2 MILLIGRAM(S): at 11:07

## 2020-01-01 RX ADMIN — FENTANYL CITRATE 2.27 MICROGRAM(S)/KG/HR: 50 INJECTION INTRAVENOUS at 11:59

## 2020-01-01 RX ADMIN — Medication 40 MILLIEQUIVALENT(S): at 14:52

## 2020-01-01 RX ADMIN — HEPARIN SODIUM 5000 UNIT(S): 5000 INJECTION INTRAVENOUS; SUBCUTANEOUS at 06:25

## 2020-01-01 RX ADMIN — METHADONE HYDROCHLORIDE 90 MILLIGRAM(S): 40 TABLET ORAL at 13:37

## 2020-01-01 RX ADMIN — Medication 1 APPLICATION(S): at 10:05

## 2020-01-01 RX ADMIN — CHLORHEXIDINE GLUCONATE 15 MILLILITER(S): 213 SOLUTION TOPICAL at 05:03

## 2020-01-01 RX ADMIN — MIDAZOLAM HYDROCHLORIDE 5 MILLIGRAM(S): 1 INJECTION, SOLUTION INTRAMUSCULAR; INTRAVENOUS at 12:20

## 2020-01-01 RX ADMIN — PIPERACILLIN AND TAZOBACTAM 200 GRAM(S): 4; .5 INJECTION, POWDER, LYOPHILIZED, FOR SOLUTION INTRAVENOUS at 18:07

## 2020-01-01 RX ADMIN — HALOPERIDOL DECANOATE 5 MILLIGRAM(S): 100 INJECTION INTRAMUSCULAR at 09:40

## 2020-01-01 RX ADMIN — Medication 5 MILLIGRAM(S): at 12:34

## 2020-01-01 RX ADMIN — Medication 1 TABLET(S): at 22:11

## 2020-01-01 RX ADMIN — Medication 1 MILLIGRAM(S): at 10:49

## 2020-01-01 RX ADMIN — Medication 1 DROP(S): at 06:24

## 2020-01-01 RX ADMIN — Medication 40 MILLIEQUIVALENT(S): at 06:40

## 2020-01-01 RX ADMIN — MIDAZOLAM HYDROCHLORIDE 25 MG/KG/HR: 1 INJECTION, SOLUTION INTRAMUSCULAR; INTRAVENOUS at 03:56

## 2020-01-01 RX ADMIN — Medication 4 MILLIGRAM(S): at 17:28

## 2020-01-01 RX ADMIN — Medication 240 MILLIGRAM(S): at 09:59

## 2020-01-01 RX ADMIN — MIDAZOLAM HYDROCHLORIDE 20 MG/KG/HR: 1 INJECTION, SOLUTION INTRAMUSCULAR; INTRAVENOUS at 17:25

## 2020-01-01 RX ADMIN — CHLORHEXIDINE GLUCONATE 1 APPLICATION(S): 213 SOLUTION TOPICAL at 06:24

## 2020-01-01 RX ADMIN — PIPERACILLIN AND TAZOBACTAM 200 GRAM(S): 4; .5 INJECTION, POWDER, LYOPHILIZED, FOR SOLUTION INTRAVENOUS at 17:05

## 2020-01-01 RX ADMIN — Medication 125 MILLIGRAM(S): at 20:12

## 2020-01-01 RX ADMIN — Medication 125 MILLIGRAM(S): at 21:29

## 2020-01-01 RX ADMIN — Medication 975 MILLIGRAM(S): at 12:34

## 2020-01-01 RX ADMIN — ONDANSETRON 4 MILLIGRAM(S): 8 TABLET, FILM COATED ORAL at 21:05

## 2020-01-01 RX ADMIN — MIDAZOLAM HYDROCHLORIDE 7.16 MG/KG/HR: 1 INJECTION, SOLUTION INTRAMUSCULAR; INTRAVENOUS at 11:59

## 2020-01-01 RX ADMIN — DESMOPRESSIN ACETATE 214 MICROGRAM(S): 0.1 TABLET ORAL at 14:08

## 2020-01-01 RX ADMIN — RASBURICASE 108 MILLIGRAM(S): KIT at 12:02

## 2020-01-01 RX ADMIN — Medication 125 MILLIGRAM(S): at 08:55

## 2020-01-01 RX ADMIN — SODIUM ZIRCONIUM CYCLOSILICATE 10 GRAM(S): 10 POWDER, FOR SUSPENSION ORAL at 08:04

## 2020-01-01 RX ADMIN — Medication 100 MILLIEQUIVALENT(S): at 00:20

## 2020-01-01 RX ADMIN — Medication 100 MILLIEQUIVALENT(S): at 23:34

## 2020-01-01 RX ADMIN — SODIUM CHLORIDE 1000 MILLILITER(S): 9 INJECTION INTRAMUSCULAR; INTRAVENOUS; SUBCUTANEOUS at 13:08

## 2020-01-01 RX ADMIN — Medication 1.92 MICROGRAM(S)/KG/MIN: at 16:07

## 2020-01-01 RX ADMIN — MIDAZOLAM HYDROCHLORIDE 25 MG/KG/HR: 1 INJECTION, SOLUTION INTRAMUSCULAR; INTRAVENOUS at 09:49

## 2020-01-01 RX ADMIN — PROPOFOL 14.7 MICROGRAM(S)/KG/MIN: 10 INJECTION, EMULSION INTRAVENOUS at 18:13

## 2020-01-01 RX ADMIN — Medication 27.5 MILLIGRAM(S): at 17:52

## 2020-01-01 RX ADMIN — METHADONE HYDROCHLORIDE 90 MILLIGRAM(S): 40 TABLET ORAL at 11:39

## 2020-01-01 RX ADMIN — Medication 250 MILLIGRAM(S): at 18:32

## 2020-01-01 RX ADMIN — Medication 120 MILLIGRAM(S): at 00:33

## 2020-01-01 RX ADMIN — ONDANSETRON 4 MILLIGRAM(S): 8 TABLET, FILM COATED ORAL at 12:08

## 2020-01-01 RX ADMIN — LACOSAMIDE 140 MILLIGRAM(S): 50 TABLET ORAL at 09:59

## 2020-01-01 RX ADMIN — Medication 125 MILLIGRAM(S): at 07:04

## 2020-01-01 RX ADMIN — Medication 125 MILLIGRAM(S): at 03:44

## 2020-01-01 RX ADMIN — Medication 1 DROP(S): at 17:29

## 2020-01-01 RX ADMIN — SODIUM CHLORIDE 1000 MILLILITER(S): 9 INJECTION INTRAMUSCULAR; INTRAVENOUS; SUBCUTANEOUS at 14:59

## 2020-01-01 RX ADMIN — FOSPHENYTOIN 32 MILLIGRAM(S) PE: 50 INJECTION INTRAMUSCULAR; INTRAVENOUS at 14:35

## 2020-01-01 RX ADMIN — LACOSAMIDE 140 MILLIGRAM(S): 50 TABLET ORAL at 22:01

## 2020-01-01 RX ADMIN — CEFTRIAXONE 100 MILLIGRAM(S): 500 INJECTION, POWDER, FOR SOLUTION INTRAMUSCULAR; INTRAVENOUS at 22:59

## 2020-01-01 RX ADMIN — Medication 8.5 MILLIGRAM(S): at 11:58

## 2020-01-01 RX ADMIN — FENTANYL CITRATE 100 MICROGRAM(S): 50 INJECTION INTRAVENOUS at 11:58

## 2020-01-01 RX ADMIN — Medication 125 MILLIGRAM(S): at 16:38

## 2020-01-01 RX ADMIN — LACOSAMIDE 140 MILLIGRAM(S): 50 TABLET ORAL at 09:26

## 2020-01-01 RX ADMIN — PERMETHRIN CREAM 5% W/W 1 APPLICATION(S): 50 CREAM TOPICAL at 16:46

## 2020-01-01 RX ADMIN — Medication 1 DROP(S): at 18:13

## 2020-01-01 RX ADMIN — CEFTRIAXONE 100 MILLIGRAM(S): 500 INJECTION, POWDER, FOR SOLUTION INTRAMUSCULAR; INTRAVENOUS at 23:02

## 2020-01-01 RX ADMIN — Medication 1000 MILLIGRAM(S): at 16:17

## 2020-01-01 RX ADMIN — HEPARIN SODIUM 5000 UNIT(S): 5000 INJECTION INTRAVENOUS; SUBCUTANEOUS at 07:04

## 2020-01-01 RX ADMIN — Medication 125 MILLIGRAM(S): at 00:23

## 2020-01-01 RX ADMIN — Medication 104 MILLIGRAM(S): at 13:46

## 2020-01-01 RX ADMIN — Medication 250 MILLIGRAM(S): at 15:37

## 2020-01-01 RX ADMIN — HEPARIN SODIUM 5000 UNIT(S): 5000 INJECTION INTRAVENOUS; SUBCUTANEOUS at 19:50

## 2020-01-01 RX ADMIN — Medication 1.92 MICROGRAM(S)/KG/MIN: at 11:26

## 2020-01-01 RX ADMIN — Medication 3.83 MICROGRAM(S)/KG/MIN: at 14:51

## 2020-01-01 RX ADMIN — HEPARIN SODIUM 5000 UNIT(S): 5000 INJECTION INTRAVENOUS; SUBCUTANEOUS at 18:12

## 2020-01-01 RX ADMIN — MIDAZOLAM HYDROCHLORIDE 25 MG/KG/HR: 1 INJECTION, SOLUTION INTRAMUSCULAR; INTRAVENOUS at 06:45

## 2020-01-01 RX ADMIN — FENTANYL CITRATE 2.27 MICROGRAM(S)/KG/HR: 50 INJECTION INTRAVENOUS at 20:57

## 2020-01-01 RX ADMIN — Medication 1 DROP(S): at 05:08

## 2020-01-01 RX ADMIN — CHLORHEXIDINE GLUCONATE 1 APPLICATION(S): 213 SOLUTION TOPICAL at 05:03

## 2020-01-01 RX ADMIN — PERMETHRIN CREAM 5% W/W 1 APPLICATION(S): 50 CREAM TOPICAL at 22:09

## 2020-01-01 RX ADMIN — FENTANYL CITRATE 50 MICROGRAM(S): 50 INJECTION INTRAVENOUS at 16:33

## 2020-01-01 RX ADMIN — Medication 25 MILLILITER(S): at 09:04

## 2020-01-01 RX ADMIN — CHLORHEXIDINE GLUCONATE 15 MILLILITER(S): 213 SOLUTION TOPICAL at 17:28

## 2020-01-01 RX ADMIN — Medication 2 MILLIGRAM(S): at 14:11

## 2020-01-01 RX ADMIN — Medication 650 MILLIGRAM(S): at 07:33

## 2020-01-01 RX ADMIN — FENTANYL CITRATE 2.27 MICROGRAM(S)/KG/HR: 50 INJECTION INTRAVENOUS at 11:58

## 2020-01-01 RX ADMIN — Medication 1 MILLIGRAM(S): at 16:24

## 2020-01-01 RX ADMIN — FENTANYL CITRATE 2.27 MICROGRAM(S)/KG/HR: 50 INJECTION INTRAVENOUS at 07:43

## 2020-01-01 RX ADMIN — CEFTRIAXONE 100 MILLIGRAM(S): 500 INJECTION, POWDER, FOR SOLUTION INTRAMUSCULAR; INTRAVENOUS at 00:23

## 2020-01-01 RX ADMIN — Medication 400 MILLIGRAM(S): at 15:45

## 2020-01-01 RX ADMIN — LEVETIRACETAM 400 MILLIGRAM(S): 250 TABLET, FILM COATED ORAL at 17:28

## 2020-01-01 RX ADMIN — HYDROMORPHONE HYDROCHLORIDE 2 MILLIGRAM(S): 2 INJECTION INTRAMUSCULAR; INTRAVENOUS; SUBCUTANEOUS at 22:00

## 2020-01-01 RX ADMIN — CEFTRIAXONE 100 MILLIGRAM(S): 500 INJECTION, POWDER, FOR SOLUTION INTRAMUSCULAR; INTRAVENOUS at 22:10

## 2020-01-01 RX ADMIN — LEVETIRACETAM 400 MILLIGRAM(S): 250 TABLET, FILM COATED ORAL at 18:13

## 2020-01-01 RX ADMIN — Medication 1.92 MICROGRAM(S)/KG/MIN: at 05:50

## 2020-01-01 RX ADMIN — MIDAZOLAM HYDROCHLORIDE 2 MG/KG/HR: 1 INJECTION, SOLUTION INTRAMUSCULAR; INTRAVENOUS at 09:43

## 2020-01-01 RX ADMIN — Medication 27.5 MILLIGRAM(S): at 05:25

## 2020-01-01 RX ADMIN — Medication 4.26 MICROGRAM(S)/KG/MIN: at 18:07

## 2020-01-01 RX ADMIN — Medication 65 MILLIGRAM(S): at 06:25

## 2020-01-01 RX ADMIN — KETAMINE HYDROCHLORIDE 4.09 MG/KG/HR: 100 INJECTION INTRAMUSCULAR; INTRAVENOUS at 07:49

## 2020-01-01 RX ADMIN — CEFTRIAXONE 100 MILLIGRAM(S): 500 INJECTION, POWDER, FOR SOLUTION INTRAMUSCULAR; INTRAVENOUS at 23:42

## 2020-01-01 RX ADMIN — ONDANSETRON 4 MILLIGRAM(S): 8 TABLET, FILM COATED ORAL at 18:45

## 2020-01-01 RX ADMIN — CEFTRIAXONE 100 MILLIGRAM(S): 500 INJECTION, POWDER, FOR SOLUTION INTRAMUSCULAR; INTRAVENOUS at 10:04

## 2020-01-01 RX ADMIN — Medication 1 TABLET(S): at 21:29

## 2020-01-01 RX ADMIN — Medication 25 MILLILITER(S): at 08:29

## 2020-01-01 RX ADMIN — FAMOTIDINE 20 MILLIGRAM(S): 10 INJECTION INTRAVENOUS at 13:10

## 2020-01-01 RX ADMIN — HEPARIN SODIUM 5000 UNIT(S): 5000 INJECTION INTRAVENOUS; SUBCUTANEOUS at 06:18

## 2020-01-01 RX ADMIN — Medication 50 MILLILITER(S): at 18:03

## 2020-01-01 RX ADMIN — ONDANSETRON 4 MILLIGRAM(S): 8 TABLET, FILM COATED ORAL at 13:10

## 2020-01-01 RX ADMIN — Medication 1 MILLIGRAM(S): at 02:18

## 2020-01-01 RX ADMIN — Medication 4.26 MICROGRAM(S)/KG/MIN: at 05:08

## 2020-01-01 RX ADMIN — Medication 975 MILLIGRAM(S): at 13:34

## 2020-01-01 RX ADMIN — METHADONE HYDROCHLORIDE 90 MILLIGRAM(S): 40 TABLET ORAL at 22:00

## 2020-01-01 RX ADMIN — HEPARIN SODIUM 5000 UNIT(S): 5000 INJECTION INTRAVENOUS; SUBCUTANEOUS at 18:20

## 2020-01-01 RX ADMIN — PIPERACILLIN AND TAZOBACTAM 200 GRAM(S): 4; .5 INJECTION, POWDER, LYOPHILIZED, FOR SOLUTION INTRAVENOUS at 11:57

## 2020-01-01 RX ADMIN — CHLORHEXIDINE GLUCONATE 15 MILLILITER(S): 213 SOLUTION TOPICAL at 05:25

## 2020-01-01 RX ADMIN — Medication 30 MILLIGRAM(S): at 10:47

## 2020-01-01 RX ADMIN — Medication 50 MILLILITER(S): at 11:30

## 2020-01-01 RX ADMIN — MIDAZOLAM HYDROCHLORIDE 0.91 MG/KG/HR: 1 INJECTION, SOLUTION INTRAMUSCULAR; INTRAVENOUS at 12:43

## 2020-01-01 RX ADMIN — LACOSAMIDE 120 MILLIGRAM(S): 50 TABLET ORAL at 00:20

## 2020-01-01 RX ADMIN — MIDAZOLAM HYDROCHLORIDE 5 MILLIGRAM(S): 1 INJECTION, SOLUTION INTRAMUSCULAR; INTRAVENOUS at 12:30

## 2020-01-01 RX ADMIN — PROPOFOL 2.45 MICROGRAM(S)/KG/MIN: 10 INJECTION, EMULSION INTRAVENOUS at 17:27

## 2020-01-01 RX ADMIN — PANTOPRAZOLE SODIUM 40 MILLIGRAM(S): 20 TABLET, DELAYED RELEASE ORAL at 11:58

## 2020-01-01 RX ADMIN — Medication 3.83 MICROGRAM(S)/KG/MIN: at 01:12

## 2020-01-01 RX ADMIN — HEPARIN SODIUM 5000 UNIT(S): 5000 INJECTION INTRAVENOUS; SUBCUTANEOUS at 18:52

## 2020-01-01 RX ADMIN — Medication 65 MILLIGRAM(S): at 20:40

## 2020-01-01 RX ADMIN — PROPOFOL 14.7 MICROGRAM(S)/KG/MIN: 10 INJECTION, EMULSION INTRAVENOUS at 23:48

## 2020-01-01 RX ADMIN — FENTANYL CITRATE 100 MICROGRAM(S): 50 INJECTION INTRAVENOUS at 12:24

## 2020-01-01 RX ADMIN — METHADONE HYDROCHLORIDE 90 MILLIGRAM(S): 40 TABLET ORAL at 13:11

## 2020-01-01 RX ADMIN — SODIUM CHLORIDE 60 MILLILITER(S): 9 INJECTION INTRAMUSCULAR; INTRAVENOUS; SUBCUTANEOUS at 16:29

## 2020-01-01 RX ADMIN — Medication 120 MILLIGRAM(S): at 03:39

## 2020-01-01 RX ADMIN — MIDAZOLAM HYDROCHLORIDE 25 MG/KG/HR: 1 INJECTION, SOLUTION INTRAMUSCULAR; INTRAVENOUS at 22:00

## 2020-01-01 RX ADMIN — PIPERACILLIN AND TAZOBACTAM 200 GRAM(S): 4; .5 INJECTION, POWDER, LYOPHILIZED, FOR SOLUTION INTRAVENOUS at 03:08

## 2020-01-01 RX ADMIN — Medication 30 MILLIGRAM(S): at 22:11

## 2020-01-01 RX ADMIN — CHLORHEXIDINE GLUCONATE 15 MILLILITER(S): 213 SOLUTION TOPICAL at 19:13

## 2020-01-01 RX ADMIN — PANTOPRAZOLE SODIUM 40 MILLIGRAM(S): 20 TABLET, DELAYED RELEASE ORAL at 12:40

## 2020-01-01 RX ADMIN — MIDAZOLAM HYDROCHLORIDE 2 MILLIGRAM(S): 1 INJECTION, SOLUTION INTRAMUSCULAR; INTRAVENOUS at 14:34

## 2020-01-01 RX ADMIN — PROPOFOL 2.72 MICROGRAM(S)/KG/MIN: 10 INJECTION, EMULSION INTRAVENOUS at 00:04

## 2020-01-01 RX ADMIN — Medication 2 MILLIGRAM(S): at 11:00

## 2020-01-01 RX ADMIN — Medication 650 MILLIGRAM(S): at 08:15

## 2020-01-01 RX ADMIN — Medication 1 TABLET(S): at 13:36

## 2020-01-01 RX ADMIN — Medication 125 MILLIGRAM(S): at 10:22

## 2020-01-01 RX ADMIN — Medication 418.48 MILLIGRAM(S): at 05:12

## 2020-01-01 RX ADMIN — HEPARIN SODIUM 5000 UNIT(S): 5000 INJECTION INTRAVENOUS; SUBCUTANEOUS at 19:04

## 2020-01-01 RX ADMIN — METHADONE HYDROCHLORIDE 90 MILLIGRAM(S): 40 TABLET ORAL at 20:12

## 2020-01-01 RX ADMIN — Medication 104 MILLIGRAM(S): at 13:19

## 2020-01-01 RX ADMIN — IOHEXOL 30 MILLILITER(S): 300 INJECTION, SOLUTION INTRAVENOUS at 18:45

## 2020-01-01 RX ADMIN — FENTANYL CITRATE 50 MICROGRAM(S): 50 INJECTION INTRAVENOUS at 14:48

## 2020-01-01 RX ADMIN — Medication 27.5 MILLIGRAM(S): at 05:48

## 2020-01-01 RX ADMIN — CHLORHEXIDINE GLUCONATE 15 MILLILITER(S): 213 SOLUTION TOPICAL at 18:11

## 2020-01-01 RX ADMIN — Medication 25 GRAM(S): at 14:35

## 2020-01-01 RX ADMIN — SODIUM CHLORIDE 2000 MILLILITER(S): 9 INJECTION, SOLUTION INTRAVENOUS at 15:29

## 2020-01-01 RX ADMIN — HEPARIN SODIUM 5000 UNIT(S): 5000 INJECTION INTRAVENOUS; SUBCUTANEOUS at 18:19

## 2020-01-01 RX ADMIN — Medication 4.26 MICROGRAM(S)/KG/MIN: at 22:54

## 2020-01-01 RX ADMIN — Medication 125 MILLIGRAM(S): at 22:11

## 2020-01-01 RX ADMIN — Medication 2 MILLIGRAM(S): at 07:03

## 2020-01-01 RX ADMIN — Medication 125 MILLIGRAM(S): at 03:07

## 2020-01-01 RX ADMIN — METHADONE HYDROCHLORIDE 90 MILLIGRAM(S): 40 TABLET ORAL at 21:29

## 2020-01-01 RX ADMIN — CEFTRIAXONE 100 MILLIGRAM(S): 500 INJECTION, POWDER, FOR SOLUTION INTRAMUSCULAR; INTRAVENOUS at 01:54

## 2020-01-01 RX ADMIN — HYDROMORPHONE HYDROCHLORIDE 2 MILLIGRAM(S): 2 INJECTION INTRAMUSCULAR; INTRAVENOUS; SUBCUTANEOUS at 22:15

## 2020-01-01 RX ADMIN — CHLORHEXIDINE GLUCONATE 15 MILLILITER(S): 213 SOLUTION TOPICAL at 06:24

## 2020-01-01 RX ADMIN — Medication 3.83 MICROGRAM(S)/KG/MIN: at 20:28

## 2020-01-01 RX ADMIN — IOHEXOL 30 MILLILITER(S): 300 INJECTION, SOLUTION INTRAVENOUS at 13:37

## 2020-01-01 RX ADMIN — Medication 125 MILLIGRAM(S): at 14:11

## 2020-01-01 RX ADMIN — CEFTRIAXONE 100 MILLIGRAM(S): 500 INJECTION, POWDER, FOR SOLUTION INTRAMUSCULAR; INTRAVENOUS at 01:00

## 2020-01-01 RX ADMIN — HEPARIN SODIUM 5000 UNIT(S): 5000 INJECTION INTRAVENOUS; SUBCUTANEOUS at 07:13

## 2020-01-01 RX ADMIN — PROPOFOL 12.3 MICROGRAM(S)/KG/MIN: 10 INJECTION, EMULSION INTRAVENOUS at 11:14

## 2020-01-01 RX ADMIN — Medication 618.48 MILLIGRAM(S): at 14:36

## 2020-01-01 RX ADMIN — LEVETIRACETAM 400 MILLIGRAM(S): 250 TABLET, FILM COATED ORAL at 05:03

## 2020-01-01 RX ADMIN — ONDANSETRON 4 MILLIGRAM(S): 8 TABLET, FILM COATED ORAL at 06:16

## 2020-01-01 RX ADMIN — Medication 125 MILLIGRAM(S): at 10:49

## 2020-01-01 RX ADMIN — MIDAZOLAM HYDROCHLORIDE 10 MILLIGRAM(S): 1 INJECTION, SOLUTION INTRAMUSCULAR; INTRAVENOUS at 09:42

## 2020-01-01 RX ADMIN — METHADONE HYDROCHLORIDE 90 MILLIGRAM(S): 40 TABLET ORAL at 12:02

## 2020-01-01 RX ADMIN — HEPARIN SODIUM 5000 UNIT(S): 5000 INJECTION INTRAVENOUS; SUBCUTANEOUS at 06:16

## 2020-01-01 RX ADMIN — CEFTRIAXONE 100 MILLIGRAM(S): 500 INJECTION, POWDER, FOR SOLUTION INTRAMUSCULAR; INTRAVENOUS at 10:01

## 2020-01-01 RX ADMIN — Medication 50 MILLILITER(S): at 13:19

## 2020-01-01 RX ADMIN — Medication 250 MILLIGRAM(S): at 21:35

## 2020-01-01 RX ADMIN — KETAMINE HYDROCHLORIDE 0.41 MG/KG/HR: 100 INJECTION INTRAMUSCULAR; INTRAVENOUS at 18:15

## 2020-10-12 NOTE — H&P ADULT - PROBLEM SELECTOR PLAN 8
Presented with troponemia in setting of ARF with no ischemic changes on EKG and elevated proBNP of 7428. Unlikely ACS, probably in setting of JEAN PIERRE.  - Trend Trop to peak  - Repeat EKG  - F/u Echocardiogram Presented with troponemia in setting of ARF with no ischemic changes on EKG and elevated proBNP of 7428. Unlikely ACS, probably in setting of JEAN PIERRE.  - Trend Trop to peak  - F/u Echocardiogram

## 2020-10-12 NOTE — H&P ADULT - PROBLEM SELECTOR PLAN 5
Ca 7.3 on presentation with Alb 2.2 - adjusted calcium for alb 8.7  - Trend CMP  - f/u PTH, Vit D Recent incarceration, endorses cocaine, heroin (5 days ago) and K2 use. Now on methadone 90mg qd  - confirmed methadone dosing 90mg qd - [phone] [facility] [address], [place] - [id number]  -C/w with methadone 90mg qd  - F/u Utox  - F/u HIV and hepatitis panel   - F/u TB quant   - Monitor for signs of withdrawal.

## 2020-10-12 NOTE — H&P ADULT - NSHPPOAPULMEMBOLUS_GEN_A_CORE
Left message with male for pt to call back.   Which orthopedic does she see? Phone number of that office?   Larry Ponce RN     no

## 2020-10-12 NOTE — H&P ADULT - PROBLEM SELECTOR PLAN 7
F: No IVF at this time  E: Replete electrolytes as needed, K>4, M>2  N: Renal Diet   P: None   D: 7Lach Ca 7.3 on presentation with Alb 2.2 - adjusted calcium for alb 8.7  - Trend CMP  - f/u PTH, Vit D

## 2020-10-12 NOTE — ED PROVIDER NOTE - OBJECTIVE STATEMENT
38 y/o M w/ PMHx heroin abuse on methadone x6 months, smokes K2 daily, b/l BKA following an accident, presents to the ED c/o n/v x2 months, states vomits multiple times per day worse w/ eating. Pt additionally c/o L stump swelling for last x2 wks, reports that was released from incarceration x2 months ago, was given wheelchair which did not fit properly and he thinks it is what caused the swelling. Also notes R stump being cold but for unknown amount of time. Denies abd pain, fever, chills, back pain, CP, SOB, urinary sx or any other acute sx. Denies marijuana abuse or EtOH abuse.

## 2020-10-12 NOTE — H&P ADULT - NSHPPHYSICALEXAM_GEN_ALL_CORE
VITAL SIGNS:  T(C): 36.5 (10-12-20 @ 14:38), Max: 36.7 (10-12-20 @ 12:21)  T(F): 97.7 (10-12-20 @ 14:38), Max: 98 (10-12-20 @ 12:21)  HR: 89 (10-12-20 @ 14:38) (89 - 99)  BP: 114/74 (10-12-20 @ 14:38) (114/74 - 114/76)  BP(mean): --  RR: 18 (10-12-20 @ 14:38) (16 - 18)  SpO2: 96% (10-12-20 @ 14:38) (96% - 96%)  Wt(kg): --    PHYSICAL EXAM:    Constitutional: WDWN resting comfortably in bed; NAD  Head: NC/AT  Eyes: PERRL, EOMI, anicteric sclera  ENT: no nasal discharge; uvula midline, no oropharyngeal erythema or exudates; MMM  Neck: supple; no JVD or thyromegaly  Respiratory: CTA B/L; no W/R/R, no retractions  Cardiac: +S1/S2; RRR; no M/R/G; PMI non-displaced  Gastrointestinal: abdomen soft, NT/ND; no rebound or guarding; +BSx4  Genitourinary: normal external genitalia  Back: spine midline, no bony tenderness or step-offs; no CVAT B/L  Extremities: WWP, no clubbing or cyanosis; no peripheral edema  Musculoskeletal: NROM x4; no joint swelling, tenderness or erythema  Vascular: 2+ radial, femoral, DP/PT pulses B/L  Dermatologic: skin warm, dry and intact; no rashes, wounds, or scars  Lymphatic: no submandibular or cervical LAD  Neurologic: AAOx3; CNII-XII grossly intact; no focal deficits  Psychiatric: affect and characteristics of appearance, verbalizations, behaviors are appropriate VITAL SIGNS:  T(C): 36.5 (10-12-20 @ 14:38), Max: 36.7 (10-12-20 @ 12:21)  T(F): 97.7 (10-12-20 @ 14:38), Max: 98 (10-12-20 @ 12:21)  HR: 89 (10-12-20 @ 14:38) (89 - 99)  BP: 114/74 (10-12-20 @ 14:38) (114/74 - 114/76)  BP(mean): --  RR: 18 (10-12-20 @ 14:38) (16 - 18)  SpO2: 96% (10-12-20 @ 14:38) (96% - 96%)  Wt(kg): --    PHYSICAL EXAM:    GENERAL: Thin appearing male in mild distress  HEAD:  Atraumatic, Normocephalic  EYES: EOMI, PERRLA, conjunctiva and sclera clear, ptosis R eye  NECK: Supple, No JVD  CHEST/LUNG: Clear to auscultation bilaterally; No wheeze; No crackles  HEART: Regular rate and rhythm; No murmurs;   ABDOMEN: Soft, Nontender, Nondistended; Bowel sounds present; No guarding  EXTREMITIES: Bilateral BKA with wound dressing,  2+ femoral pulses  NEUROLOGY: AAOx3, non-focal  SKIN: No rashes or lesions

## 2020-10-12 NOTE — ED PROVIDER NOTE - PHYSICAL EXAMINATION
CONSTITUTIONAL: Unkempt appearing; well-nourished; in no acute distress.   HEAD: Normocephalic; atraumatic.   EYES: PERRL; EOM intact; conjunctiva and sclera clear  ENT: normal nose; no rhinorrhea; normal pharynx with no erythema or lesions.   NECK: Supple; non-tender; no LAD  CARDIOVASCULAR: Normal S1, S2; no murmurs, rubs, or gallops. Regular rate and rhythm.   RESPIRATORY: Breathing easily; breath sounds clear and equal bilaterally; no wheezes, rhonchi, or rales.  GI: Soft; non-distended; non-tender; no palpable organomegaly.   MSK: FROM at all extremities  EXT: b/l BKA, L stump +swelling, no erythema or warmth, 4x5 cm ulceration to distal end of stump which is clean w/o purulent drainage, R stump cold to the touch  SKIN: Normal for age and race; warm; dry; good turgor; no apparent lesions or rash.   NEURO: A & O x 3; face symmetric; grossly unremarkable.   PSYCHOLOGICAL: The patient’s mood and manner are appropriate. CONSTITUTIONAL: Unkempt appearing; dry appearing; in no acute distress.   HEAD: Normocephalic; atraumatic.   EYES: PERRL; EOM intact; conjunctiva and sclera clear  ENT: normal nose; no rhinorrhea; normal pharynx with no erythema or lesions.   NECK: Supple; non-tender; no LAD  CARDIOVASCULAR: Normal S1, S2; no murmurs, rubs, or gallops. Regular rate and rhythm.   RESPIRATORY: Breathing easily; breath sounds clear and equal bilaterally; no wheezes, rhonchi, or rales.  GI: Soft; non-distended; non-tender; no palpable organomegaly.   MSK: FROM at all extremities  EXT: b/l BKA, L stump +swelling, +warmth, no erythema 4x5 cm ulceration to distal end of stump which is clean w/o purulent drainage, R stump cold to the touch  SKIN: Normal for age and race;   NEURO: A & O x 3; face symmetric; grossly unremarkable.   PSYCHOLOGICAL: The patient’s mood and manner are appropriate.

## 2020-10-12 NOTE — ED ADULT NURSE REASSESSMENT NOTE - NS ED NURSE REASSESS COMMENT FT1
Elevated creat. PA aware, repeat CMP sent. EKG done, moved to main ED on tele monitoring due to peaked T waves. Pt AAOX3, remains weak appearing.

## 2020-10-12 NOTE — H&P ADULT - HISTORY OF PRESENT ILLNESS
39M Undomiciled PMH Heroin abuse (on methadone) with spinal cord injury c/b b/l BKA 8-10 years ago ([facility]) now presents with worsening fatigue for the last 2 weeks, in the setting of 2 months of nausea and vomiting. Denies fever, chest pain, changes in mental status, cough, hemoptysis, weight loss, night sweats. Of note patient reports that he was recently incarcerated for 6 months and was released in July of this year, he reports that he had blood work done and there was no mention of renal dysfunction. He does not follow up with a primary care physician and does not take any medication other than methadone [phone]. He last used heroin 5 days ago but is making a concerted effort to stop using.        In the ED VS T 98 HR 99 /76 R16 96% on RA  Labs: WBC 7 | Hg/Hct 11/35 MCV 77 | Plt 577 | Na 140 K 5.4 | Co2 31 AG 33 BUN/ Cr 192/9.92 | TP/ Alb 8.8/ 2/2 | AST/ALT 22/10  |  EKG: NSR Qtc 468 peaked T waves in V3-V5 unknown baseline, LVH.  CXR - no focal consolidations    ED Course: Famotidine 20, Zofran 4 mg and 2L NS. Nephrology and Vacular Surgery consulted      **Incomplete Note**    39M Undomiciled PMH Heroin abuse (on methadone) with spinal cord injury c/b b/l BKA 8-10 years ago ([facility]) now presents with worsening fatigue for the last 2 weeks, in the setting of 2 months of nausea and vomiting. Denies fever, chest pain, changes in mental status, cough, hemoptysis, weight loss, night sweats. Of note patient reports that he was recently incarcerated for 6 months and was released in July of this year, he reports that he had blood work done and there was no mention of renal dysfunction. He does not follow up with a primary care physician and does not take any medication other than methadone [phone]. He last used heroin 5 days ago but is making a concerted effort to stop using.        In the ED VS T 98 HR 99 /76 R16 96% on RA  Labs: WBC 7 | Hg/Hct 11/35 MCV 77 | Plt 577 | Na 140 K 5.4 | Co2 31 AG 33 BUN/ Cr 192/9.92 | TP/ Alb 8.8/ 2/2 | AST/ALT 22/10  |  EKG: NSR Qtc 468 peaked T waves in V3-V5 unknown baseline, LVH.  CXR - no focal consolidations    ED Course: Famotidine 20, Zofran 4 mg and 2L NS. Nephrology and Vacular Surgery consulted        39M Undomiciled PMH Heroin abuse (on methadone) with spinal cord injury c/b b/l BKA 8-10 years ago ([facility]) now presents with worsening fatigue for the last 2 weeks, in the setting of 2 months of nausea and vomiting. Denies fever, chest pain, changes in mental status, cough, hemoptysis, weight loss, night sweats. Of note patient reports that he was recently incarcerated for 6 months and was released in July of this year, he reports that he had blood work done and there was no mention of renal dysfunction. He does not follow up with a primary care physician and does not take any medication other than methadone [phone]. He last used heroin 5 days ago but is making a concerted effort to stop using.        In the ED VS T 98 HR 99 /76 R16 96% on RA  Labs: WBC 7 | Hg/Hct 11/35 MCV 77 | Plt 577 | Na 140 K 5.4 | Co2 31 AG 33 BUN/ Cr 192/9.92 | TP/ Alb 8.8/ 2/2 | AST/ALT 22/10  |  EKG: NSR Qtc 468 peaked T waves in V3-V5 unknown baseline, LVH.  CXR - no focal consolidations    ED Course: Famotidine 20, Zofran 4 mg and 2L NS. Nephrology and Vacular Surgery consulted

## 2020-10-12 NOTE — H&P ADULT - PROBLEM SELECTOR PLAN 9
F: No IVF at this time  E: Replete electrolytes as needed, K>4, M>2  N: Renal Diet   P: None   D: 7Lach F: C/w IVF   E: Replete electrolytes as needed, K>4, M>2  N: Renal Diet   P: None   D: 7Lach F: C/w IVF   E: Replete electrolytes as needed, K>4, M>2  N: Renal Diet   P: Heparin Subq   D: 7Lach

## 2020-10-12 NOTE — H&P ADULT - PROBLEM SELECTOR PLAN 1
Presented with decreased urine output over last few days to weeks with elevated BUN/ Cr 192/9.9,  unknown baseline.   -S/p 2L of NS in ED  -F/u renal ultrasound  -F/u urine lytes   -Strict I&O  -Trend Cr   -Renal consulted, follow recs Presented with decreased urine output over last few days to weeks with elevated BUN/ Cr 192/9.9,  unknown baseline.   -S/p 2L of NS in ED  -C/w with IVF   -F/u renal ultrasound  -F/u urine lytes   -Strict I&O  -Trend Cr   -Renal consulted, follow recs

## 2020-10-12 NOTE — H&P ADULT - NSHPLABSRESULTS_GEN_ALL_CORE
.  LABS:                         11.2   7.33  )-----------( 577      ( 12 Oct 2020 12:57 )             35.4     10-12    140  |  79<L>  |  185<H>  ----------------------------<  83  5.4<H>   |  30  |  9.62<H>    Ca    6.8<L>      12 Oct 2020 14:09    TPro  7.2  /  Alb  1.9<L>  /  TBili  0.6  /  DBili  x   /  AST  22  /  ALT  10  /  AlkPhos  627<H>  10-12        CARDIAC MARKERS ( 12 Oct 2020 14:09 )  x     / 0.23 ng/mL / 106 U/L / x     / 3.8 ng/mL      Serum Pro-Brain Natriuretic Peptide: 7428 pg/mL (10-12 @ 14:09)        RADIOLOGY, EKG & ADDITIONAL TESTS: Reviewed.

## 2020-10-12 NOTE — ED ADULT TRIAGE NOTE - CHIEF COMPLAINT QUOTE
pt c/o vomiting since this morning. denies abdominal pain. as per EMS pt took his methadone this morning.

## 2020-10-12 NOTE — ED ADULT NURSE NOTE - ALCOHOL PRE SCREEN (AUDIT - C)
Dehydration    The human body is comprised largely of water. If you lose more fluids than you take in, you can become dehydrated. This means there are not enough fluids in your body for it to function right. Mild dehydration can cause weakness, confusion, or muscle cramps. In extreme cases, it can lead to brain damage and even death. That's why prompt treatment is crucial.  Risk factors  Anyone can become dehydrated. But infants, children, and older adults are at greatest risk. You are most likely to lose fluids with severe vomiting, diarrhea, or a fever. Exercising or working hard--especially in hot weather--can also cause excess fluid loss.  What to do  Drinking liquids is the best way to prevent dehydration. Water is best, but juice or frozen pops can also help. Your doctor may suggest electrolyte solutions for sick infants and young children.  When to go to the emergency room (ER)  Go to an ER right away for these symptoms:  Adults  · Very dark urine and little urine output  · Dizziness, weakness, confusion, fainting  Children  · Sunken eyes  · Little or no urine output (for infants, no wet diaper in 8 hours)  · Very dark urine  · Skin that doesn't bounce back quickly when pinched  · Crying without tears  What to expect in the ER  Your blood pressure, temperature, and heart rate will be checked. You may have blood or urine tests. The main treatment for dehydration is fluids. You may be given these to drink. Or, you may receive them through a vein in your arm. You also may be treated for diarrhea, vomiting, or a high fever.   Date Last Reviewed: 7/18/2015  © 0749-2132 [employer], [employer] [address] [place] [address], [place] [address]. All rights reserved. This information is not intended as a substitute for professional medical care. Always follow your healthcare professional's instructions.         Statement Selected

## 2020-10-12 NOTE — CONSULT NOTE ADULT - SUBJECTIVE AND OBJECTIVE BOX
Attending:  Sarmad    39y undomiciled Male PMHx heroin abuse (on methadone) with spinal cord injury c/b b/l BKA 8-10 years ago. Presenting w/ swollen distal LLE and cool distal RLE. Pt noted wounds at the distal aspect of both amputation sites. Denies any other active medical problems and does not actively take medications other than methadone. Reports left leg increased in swelling/warmth about 3-4 weeks ago. Denies N/V/F/CP/SOB.     MEDICATIONS  (STANDING):    MEDICATIONS  (PRN):      Allergies    No Known Allergies    Intolerances        SOCIAL HISTORY:    FAMILY HISTORY:      REVIEW OF SYSTEMS  Negative except for noted in HPI.    Vital Signs Last 24 Hrs  T(C): 36.7 (12 Oct 2020 12:21), Max: 36.7 (12 Oct 2020 12:21)  T(F): 98 (12 Oct 2020 12:21), Max: 98 (12 Oct 2020 12:21)  HR: 99 (12 Oct 2020 12:21) (99 - 99)  BP: 114/76 (12 Oct 2020 12:21) (114/76 - 114/76)  BP(mean): --  RR: 16 (12 Oct 2020 12:21) (16 - 16)  SpO2: 96% (12 Oct 2020 12:21) (96% - 96%)    I&O's Summary      Physical Exam:  General: NAD, resting comfortably  HEENT: NC/AT, EOMI, normal hearing, no carotid bruits  Pulmonary: normal resp effort, CTA-B  Cardiovascular: NSR  Abdominal: soft, NT/ND, no organomegaly  Extremities: R distal limb cool to the touch. Ulcer noted at distal limb w/ eschar. L distal limb swollen & warm. Ulcer noted at distal limb. No notable purulence/pus.  Neuro: A/O x 3, CNs II-XII grossly intact, normal sensation, no focal deficits  Pulses:   Right:  FEM [x ]2+ [ ]1+ [ ]doppler    POP [ ]2+ [ ]1+ [x]doppler    Left:  FEM [x]2+ [ ]1+ [ ]doppler  POP [ ]2+ [ ]1+ [x]doppler    LABS:                        11.2   7.33  )-----------( 577      ( 12 Oct 2020 12:57 )             35.4     RADIOLOGY & ADDITIONAL STUDIES:     Attending:  Sarmad    39y undomiciled Male PMHx heroin abuse (on methadone) with spinal cord injury c/b b/l BKA 8-10 years ago. Presenting w/ swollen distal LLE and cool distal RLE. Pt noted wounds at the distal aspect of both amputation sites. Denies any other active medical problems and does not actively take medications other than methadone. Reports left leg increased in swelling/warmth about 3-4 weeks ago. Denies N/V/F/CP/SOB.     MEDICATIONS  (STANDING):    MEDICATIONS  (PRN):      Allergies    No Known Allergies    Intolerances        SOCIAL HISTORY:    FAMILY HISTORY:      REVIEW OF SYSTEMS  Negative except for noted in HPI.    Vital Signs Last 24 Hrs  T(C): 36.7 (12 Oct 2020 12:21), Max: 36.7 (12 Oct 2020 12:21)  T(F): 98 (12 Oct 2020 12:21), Max: 98 (12 Oct 2020 12:21)  HR: 99 (12 Oct 2020 12:21) (99 - 99)  BP: 114/76 (12 Oct 2020 12:21) (114/76 - 114/76)  BP(mean): --  RR: 16 (12 Oct 2020 12:21) (16 - 16)  SpO2: 96% (12 Oct 2020 12:21) (96% - 96%)    I&O's Summary      Physical Exam:  General: NAD, resting comfortably  HEENT: NC/AT, EOMI, normal hearing, no carotid bruits  Pulmonary: normal resp effort, CTA-B  Cardiovascular: NSR  Abdominal: soft, NT/ND, no organomegaly  Extremities: R distal limb cool to the touch. Ulcer noted at distal limb w/ eschar. L distal limb swollen & warm. Ulcer noted at distal limb. No notable purulence/pus.  Neuro: A/O x 3, CNs II-XII grossly intact, normal sensation, no focal deficits  Pulses:   Right:  FEM [x ]2+ [ ]1+ [ ]doppler    POP non-dopplerable    Left:  FEM [x]2+ [ ]1+ [ ]doppler  POP [ ]2+ [ ]1+ [x]doppler    LABS:                        11.2   7.33  )-----------( 577      ( 12 Oct 2020 12:57 )             35.4     RADIOLOGY & ADDITIONAL STUDIES:

## 2020-10-12 NOTE — ED PROVIDER NOTE - CLINICAL SUMMARY MEDICAL DECISION MAKING FREE TEXT BOX
38 y/o M w/ PMHx heroin abuse on methadone x6 months, smokes K2 daily, b/l BKA following an accident, presents w/ n/v x2 months. No abd pain, fever or chills. Also w/ L leg swelling and R leg feeling cold. Here in ED VSS. Upon exam abd soft, nontender, nondistended. L stump +swelling, no erythema or warmth, 4x5 cm ulceration to distal end of stump which is clean w/o purulent drainage, R stump cold to the touch. Will check labs, US of LLE to r/o DVT. Vascular consulted for arterial occlusion of R leg. 38 y/o M w/ PMHx heroin abuse on methadone x6 months, smokes K2 daily, b/l BKA following an accident, presents w/ n/v x2 months. No abd pain, fever or chills. Also w/ L leg swelling and R leg feeling cold. Here in ED VSS. Upon exam abd soft, nontender, nondistended. b/l BKA, L stump +swelling, +warmth, no erythema 4x5 cm ulceration to distal end of stump which is clean w/o purulent drainage, R stump cold to the touch. Will check labs, US of LLE to r/o DVT. Vascular consulted for arterial occlusion of R leg. Vomiting without abd pain doubt acute abd pathology given benign abd exam.

## 2020-10-12 NOTE — ED PROVIDER NOTE - DIAGNOSTIC INTERPRETATION
ER PA: Deb Wild, PAC  CHEST XRAY INTERPRETATION: lungs clear, heart shadow normal, bony structures intact

## 2020-10-12 NOTE — CHART NOTE - NSCHARTNOTEFT_GEN_A_CORE
Plan for urgent dialysis tonight per nephrology. Called and spoke to patient's family to help alleviate concerns for HD cath placement for dialysis. Pt consented for HD cath placement however during procedure patient uncooperative, moving around and ultimately unable to place. Pt explained risks of not receiving dialysis however pt continues to refuse. Will attempt for HD cath placement tomorrow.

## 2020-10-12 NOTE — CONSULT NOTE ADULT - SUBJECTIVE AND OBJECTIVE BOX
Patient is a 39y old  Male who presents with a chief complaint of ESRD    HPI:  39M PMH Heroin abuse (on methadone) with spinal cord injury c/b b/l BKA 8-10 years ago.      In the ED VS T 98 HR 99 /76 R16 96% on RA  Labs: WBC 7 | Hg/Hct 11/35 MCV 77 | Plt 577 | Na 140 K 5.4 | Co2 31 AG 33 BUN/ Cr 192/9.92 | TP/ Alb 8.8/ 2/2 | AST/ALT 22/10  |    Allergies    No Known Allergies    Intolerances      Home Medications:      SOCIAL HX:     Smoking          ETOH/Illicit drugs          Occupation    PAST MEDICAL & SURGICAL HISTORY:  Above-knee amputation of left lower extremity        FAMILY HISTORY:  :    No known cardiovascular or pulmonary family history     ROS:  See HPI     PHYSICAL EXAM    ICU Vital Signs Last 24 Hrs  T(C): 36.5 (12 Oct 2020 14:38), Max: 36.7 (12 Oct 2020 12:21)  T(F): 97.7 (12 Oct 2020 14:38), Max: 98 (12 Oct 2020 12:21)  HR: 89 (12 Oct 2020 14:38) (89 - 99)  BP: 114/74 (12 Oct 2020 14:38) (114/74 - 114/76)  RR: 18 (12 Oct 2020 14:38) (16 - 18)  SpO2: 96% (12 Oct 2020 14:38) (96% - 96%)    GENERAL: NAD, speaks in full sentences, no signs of respiratory distress  HEAD:  Atraumatic, Normocephalic  EYES: EOMI, PERRLA, conjunctiva and sclera clear  NECK: Supple, No JVD  CHEST/LUNG: Clear to auscultation bilaterally; No wheeze; No crackles; No accessory muscles used  HEART: Regular rate and rhythm; No murmurs;   ABDOMEN: Soft, Nontender, Nondistended; Bowel sounds present; No guarding  EXTREMITIES:  2+ Peripheral Pulses, No cyanosis or edema  PSYCH: AAOx3  NEUROLOGY: non-focal  SKIN: No rashes or lesions        LABS:                          11.2   7.33  )-----------( 577      ( 12 Oct 2020 12:57 )             35.4                                               10-12    140  |  79<L>  |  x   ----------------------------<  83  5.4<H>   |  30  |  9.62<H>    Ca    6.8<L>      12 Oct 2020 14:09    TPro  7.2  /  Alb  1.9<L>  /  TBili  0.6  /  DBili  x   /  AST  22  /  ALT  10  /  AlkPhos  627<H>  10-12                                                    LIVER FUNCTIONS - ( 12 Oct 2020 14:09 )  Alb: 1.9 g/dL / Pro: 7.2 g/dL / ALK PHOS: 627 U/L / ALT: 10 U/L / AST: 22 U/L / GGT: x                                                                                                                                           CXR:    ECHO:    MEDICATIONS  (STANDING):    MEDICATIONS  (PRN):         Patient is a 39y old  Male who presents with a chief complaint of ESRD    HPI:  39M Undomiciled PMH Heroin abuse (on methadone) with spinal cord injury c/b b/l BKA 8-10 years ago ([facility]) now presents with worsening fatigue for the last 2 weeks, in the setting of 2 months of nausea and vomiting. Denies fever, chest pain, changes in mental status, cough, hemoptysis, weight loss, night sweats. Of note patient reports that he was recently incarcerated for 6 months and was released in July of this year, he reports that he had blood work done and there was no mention of renal dysfunction. He does not follow up with a primary care physician and does not take any medication other than methadone [phone]. He last used heroin 5 days ago but is making a concerted effort to stop using.        In the ED VS T 98 HR 99 /76 R16 96% on RA  Labs: WBC 7 | Hg/Hct 11/35 MCV 77 | Plt 577 | Na 140 K 5.4 | Co2 31 AG 33 BUN/ Cr 192/9.92 | TP/ Alb 8.8/ 2/2 | AST/ALT 22/10  |  EKG: NSR Qtc 468 peaked T waves in V3-V5 unknown baseline, LVH.  CXR - no focal consolidations    ED Course: Famotidine 20, Zofran 4 mg and 2L NS. Nephrology and Vacular Surgery consulted     Allergies  No Known Allergies    Home Medications:  Methadone       SOCIAL HX:     SmokinPPD since teenage years        ETOH/Illicit drugs: Heroin and Cocaine            PAST MEDICAL & SURGICAL HISTORY:  Above-knee amputation of left lower extremity    FAMILY HISTORY:    Diabetes, HTN, HLD      PHYSICAL EXAM    ICU Vital Signs Last 24 Hrs  T(C): 36.5 (12 Oct 2020 14:38), Max: 36.7 (12 Oct 2020 12:21)  T(F): 97.7 (12 Oct 2020 14:38), Max: 98 (12 Oct 2020 12:21)  HR: 89 (12 Oct 2020 14:38) (89 - 99)  BP: 114/74 (12 Oct 2020 14:38) (114/74 - 114/76)  RR: 18 (12 Oct 2020 14:38) (16 - 18)  SpO2: 96% (12 Oct 2020 14:38) (96% - 96%)    GENERAL:  cachectic illl appearing male in mild distress, speaks in full sentences, no signs of respiratory distress  HEAD:  Atraumatic, Normocephalic  EYES: EOMI, PERRLA, conjunctiva and sclera clear, ptosis R eye  NECK: Supple, No JVD  CHEST/LUNG: Clear to auscultation bilaterally; No wheeze; No crackles; No accessory muscles used  HEART: Regular rate and rhythm; No murmurs;   ABDOMEN: Soft, Nontender, Nondistended; Bowel sounds present; No guarding  EXTREMITIES:  2+ femoral pulses, bilateral BKA dressed in ACE wrap  PSYCH: AAOx3  NEUROLOGY: non-focal  SKIN: No rashes or lesions        LABS:                          11.2   7.33  )-----------( 577      ( 12 Oct 2020 12:57 )             35.4                                               10-12    140  |  79<L>  |  x   ----------------------------<  83  5.4<H>   |  30  |  9.62<H>    Ca    6.8<L>      12 Oct 2020 14:09    TPro  7.2  /  Alb  1.9<L>  /  TBili  0.6  /  DBili  x   /  AST  22  /  ALT  10  /  AlkPhos  627<H>  1012    LIVER FUNCTIONS - ( 12 Oct 2020 14:09 )  Alb: 1.9 g/dL / Pro: 7.2 g/dL / ALK PHOS: 627 U/L / ALT: 10 U/L / AST: 22 U/L / GGT: x

## 2020-10-12 NOTE — H&P ADULT - PROBLEM SELECTOR PLAN 4
Likely 2/2 AOCD in setting of renal dysfunction. No signs of acute blood loss.   - f/u iron studies B12 and Folate  - Transfuse Hg<7  - Maintain active Type and Screen Presenting with LLE cellulitis 2/2 ulceration. s/p bilateral BKA RLE w/ probable chronic limb ischemia and LLE concerning for cellulitis  - s/p vancomycin and zosyn in the ED   - Vascular surgery following recommended IV abx and may do RLE AKA later in hospital course when medically stable  -C/w with vancomycin   - f/u LE dopplers r/o DVT  - DSD + betadine to B/L LE    #UTI   -UA+  -C/w with CTX 2mg qd Presenting with LLE cellulitis 2/2 ulceration. s/p bilateral BKA RLE w/ probable chronic limb ischemia and LLE concerning for cellulitis  - s/p vancomycin and zosyn in the ED   - Vascular surgery following recommended IV abx and may do RLE AKA later in hospital course when medically stable  -C/w with vancomycin   - f/u LE dopplers r/o DVT  - C/w with wound care     #UTI   -UA+  -C/w with CTX 2mg qd

## 2020-10-12 NOTE — ED ADULT NURSE NOTE - NSIMPLEMENTINTERV_GEN_ALL_ED
Implemented All Fall Risk Interventions:  [place] to call system. Call bell, personal items and telephone within reach. Instruct patient to call for assistance. Room bathroom lighting operational. Non-slip footwear when patient is off stretcher. Physically safe environment: no spills, clutter or unnecessary equipment. Stretcher in lowest position, wheels locked, appropriate side rails in place. Provide visual cue, wrist band, yellow gown, etc. Monitor gait and stability. Monitor for mental status changes and reorient to person, place, and time. Review medications for side effects contributing to fall risk. Reinforce activity limits and safety measures with patient and family.

## 2020-10-12 NOTE — H&P ADULT - NSICDXPASTSURGICALHX_GEN_ALL_CORE_FT
PAST SURGICAL HISTORY:  Above-knee amputation of left lower extremity     Above-knee amputation of right lower extremity

## 2020-10-12 NOTE — H&P ADULT - PROBLEM SELECTOR PLAN 6
Presented with troponemia in setting of ARF with no ischemic changes on EKG and elevated proBNP of 7428. Unlikely ACS.  - Trend Trop to peak  - Repeat EKG  - F/u Echocardiogram Likely 2/2 ACD in setting of renal dysfunction. No signs of acute blood loss.   - f/u iron studies B12 and Folate  - Transfuse Hg<7  - Maintain active Type and Screen

## 2020-10-12 NOTE — ED ADULT NURSE REASSESSMENT NOTE - NS ED NURSE REASSESS COMMENT FT1
Patient awake and alert.  Labs drawn.  22g IV noted R AC, flushes freely + bld return.  Dave BKA noted, patient states "I don't feel well."  Doppler done, XR done, ICU consult at the bedside.  Transported to US via stretcher.

## 2020-10-12 NOTE — ED ADULT NURSE NOTE - OBJECTIVE STATEMENT
Pt presents to ED for vomiting x2 months. Denies abd pain, episode of diarrhea yesterday. Says he is unable to tolerate much PO intake, actively vomiting. AAOX3. Pt appears weak. Hx of B/L LE amputations due to spinal cord injury (fall from 7th story building). Pt also states he has been consistently losing weight and having B/L LE stump swelling. L stump +edema. R 20g PIV placed, labs collected. Pt admits to frequent "K2" use.

## 2020-10-12 NOTE — H&P ADULT - PROBLEM SELECTOR PLAN 3
Recent incarceration, endorses cocaine, heroin (5 days ago) and K2 use. Now on methadone 90mg qd  - confirmed methadone dosing 90mg qd - [phone] [facility] [address], [place] - [id number]  -C/w with methadone 90mg qd  - F/u serum Utox  - F/u HIV and hepatitis panel   - F/u TB quant   - Monitor for signs of withdrawal. AGMA in setting of uremia and underlying metabolic alkalosis with respiratory compensation in the setting of chronic nausea and vomiting.     #Hyperkalemia   Hyperkalemia 5.4 on admission with EKG showing peaked T waves isolate in V3-5, unknown baseline. AGMA in setting of uremia and underlying metabolic alkalosis with respiratory compensation in the setting of chronic nausea and vomiting.     #Hyperkalemia   Hyperkalemia 5.4 on admission with EKG showing peaked T waves isolate in V4, unknown baseline.  -Trend BMP

## 2020-10-12 NOTE — CONSULT NOTE ADULT - ASSESSMENT
39M PMH Heroin abuse (on methadone) with spinal cord injury c/b b/l BKA 8-10 years ago now presenting with ?acute renal failure. ICU consulted.    RENAL  Acute Renal Failure  Presented with elevated BUN and Cr unknown baseline. Hemodynamically stable, 98% on room air. AGMA 33 in setting of uremia and elevated bicarbonate in setting of renal compensation. Hyperkalemia 5.4 on admission with EKG showing ____.   - Please obtain CXR, VBG/ABG  - Renal consult for HD      Dispo: Pending ICU evaluation. 39M PMH Heroin abuse (on methadone) with spinal cord injury c/b b/l BKA 8-10 years ago now presenting with ?acute renal failure. ICU consulted.    RENAL    #Acute Renal Failure w/ Hyperkalemia (5.4)  Presented with elevated BUN and Cr unknown baseline. Hemodynamically stable, 98% on room air. AGMA 33 in setting of uremia and underlying metabolic alkalosis with respiratory compensation in the setting of chronic nausea and vomiting. Hyperkalemia 5.4 on admission with EKG showing peaked T waves isolate in V3-5, unknown baseline.   - Renal consult for HD  - f/u Hepatitis labs  - f/u AM QuantiFeron/PPD   - Repeat EKG    CARDIOVASCULAR    #Troponemia  Presented with troponemia in setting of ARF with no ischemic changes on EKG. Unlikely ACS.  - Trend Trop to peak  - Repeat EKG  - Consider Echocardiogram    ID    #SIRS 1/2 (tachycardia without leukocytosis, tachypnea or fever) + LLE w/ cellulitis 2/2 ulceration.  s/p bilateral BKA RLE w/ probable chronic limb ischemia and LLE concerning for cellulitis  - s/p vancomycin and zosyn in the ED   - Vascular surgery following recommended IV abx and may do RLE AKA later in hospital course when medically stable  - f/u LE dopplers r/o DVT  - DSD + betadine to B/L LE    NEURO    #Polysubstance Abuse   Recent incarceration, endorses cocaine, heroin (5 days ago) and K2 use. Now on methadone.  - Obtain methadone dosing [phone] [facility] [address], [place] - [id number]  - f/u Utox (if patient able to urinate)  - Monitor for signs of withdrawal.    Dispo: Pending ICU evaluation. 39M PMH Heroin abuse (on methadone) with spinal cord injury c/b b/l BKA 8-10 years ago now presenting with ?acute renal failure. ICU consulted.    RENAL    #Acute Renal Failure w/ Hyperkalemia (5.4)  Presented with elevated BUN and Cr unknown baseline. Hemodynamically stable, 98% on room air. AGMA 33 in setting of uremia and underlying metabolic alkalosis with respiratory compensation in the setting of chronic nausea and vomiting. Hyperkalemia 5.4 on admission with EKG showing peaked T waves isolate in V3-5, unknown baseline.   - Renal consult for HD  - f/u Hepatitis labs  - f/u AM QuantiFeron/PPD   - f/u HbA1c  - Repeat EKG    CARDIOVASCULAR    #Troponemia  Presented with troponemia in setting of ARF with no ischemic changes on EKG. Unlikely ACS.  - Trend Trop to peak  - Repeat EKG  - Consider Echocardiogram    ID    #SIRS 1/2 (tachycardia without leukocytosis, tachypnea or fever) + LLE w/ cellulitis 2/2 ulceration.  s/p bilateral BKA RLE w/ probable chronic limb ischemia and LLE concerning for cellulitis  - s/p vancomycin and zosyn in the ED   - Vascular surgery following recommended IV abx and may do RLE AKA later in hospital course when medically stable  - f/u LE dopplers r/o DVT  - DSD + betadine to B/L LE    NEURO    #Polysubstance Abuse   Recent incarceration, endorses cocaine, heroin (5 days ago) and K2 use. Now on methadone.  - Obtain methadone dosing [phone] [facility] [address], [place] - [id number]  - f/u Utox (if patient able to urinate)  - Monitor for signs of withdrawal.    HEME    #Microcytic Anemia  Likely 2/2 AOCD in setting of renal dysfunction. No signs of acute blood loss.   - f/u iron studies B12 and Folate  - Transfuse Hg<7  - Maintain active Type and Screen    Dispo: Pending ICU evaluation. 39M PMH Heroin abuse (on methadone) with spinal cord injury c/b b/l BKA 8-10 years ago now presenting with ?acute renal failure. ICU consulted.    RENAL    #Acute Renal Failure w/ Hyperkalemia (5.4)  Presented with elevated BUN and Cr unknown baseline. Hemodynamically stable, 98% on room air. AGMA 33 in setting of uremia and underlying metabolic alkalosis with respiratory compensation in the setting of chronic nausea and vomiting. Hyperkalemia 5.4 on admission with EKG showing peaked T waves isolate in V3-5, unknown baseline.   - Renal consult for HD  - f/u Hepatitis labs  - f/u AM QuantiFeron/PPD   - f/u HbA1c  - Repeat EKG    #Hypocalcemia  Ca 7.3 on presentation with Alb 2.2 - adjusted calcium for alb 8.7  - Trend CMP  - f/u PTH, Vit D    CARDIOVASCULAR    #Troponemia  Presented with troponemia in setting of ARF with no ischemic changes on EKG. Unlikely ACS.  - Trend Trop to peak  - Repeat EKG  - Consider Echocardiogram    ID    #SIRS 1/2 (tachycardia without leukocytosis, tachypnea or fever) + LLE w/ cellulitis 2/2 ulceration.  s/p bilateral BKA RLE w/ probable chronic limb ischemia and LLE concerning for cellulitis  - s/p vancomycin and zosyn in the ED   - Vascular surgery following recommended IV abx and may do RLE AKA later in hospital course when medically stable  - f/u LE dopplers r/o DVT  - DSD + betadine to B/L LE    NEURO    #Polysubstance Abuse   Recent incarceration, endorses cocaine, heroin (5 days ago) and K2 use. Now on methadone.  - Obtain methadone dosing [phone] [facility] [address], [place] - [id number]  - f/u Utox (if patient able to urinate)  - Monitor for signs of withdrawal.    HEME    #Microcytic Anemia  Likely 2/2 AOCD in setting of renal dysfunction. No signs of acute blood loss.   - f/u iron studies B12 and Folate  - Transfuse Hg<7  - Maintain active Type and Screen      Dispo: Pending ICU evaluation. 39M PMH Heroin abuse (on methadone) with spinal cord injury c/b b/l BKA 8-10 years ago now presenting with ?acute renal failure. ICU consulted.    RENAL    #Acute Renal Failure w/ Hyperkalemia (5.4)  Presented with elevated BUN and Cr unknown baseline. Hemodynamically stable, 98% on room air. AGMA 33 in setting of uremia and underlying metabolic alkalosis with respiratory compensation in the setting of chronic nausea and vomiting. Hyperkalemia 5.4 on admission with EKG showing peaked T waves isolate in V3-5, unknown baseline.   - Renal consult for HD  - f/u Hepatitis labs  - f/u AM QuantiFeron/PPD   - f/u HbA1c  - Repeat EKG    #Hypocalcemia  Ca 7.3 on presentation with Alb 2.2 - adjusted calcium for alb 8.7  - Trend CMP  - f/u PTH, Vit D    CARDIOVASCULAR    #Troponemia  Presented with troponemia in setting of ARF with no ischemic changes on EKG. Unlikely ACS.  - Trend Trop to peak  - Repeat EKG  - Consider Echocardiogram    ID    #SIRS 1/2 (tachycardia without leukocytosis, tachypnea or fever) + LLE w/ cellulitis 2/2 ulceration.  s/p bilateral BKA RLE w/ probable chronic limb ischemia and LLE concerning for cellulitis  - s/p vancomycin and zosyn in the ED   - Vascular surgery following recommended IV abx and may do RLE AKA later in hospital course when medically stable  - f/u LE dopplers r/o DVT  - DSD + betadine to B/L LE    NEURO    #Polysubstance Abuse   Recent incarceration, endorses cocaine, heroin (5 days ago) and K2 use. Now on methadone 90mg qd  - confirmed methadone dosing 90mg qd - [phone] [facility] [address], [place] - [id number]  - f/u Utox (if patient able to urinate)  - Monitor for signs of withdrawal.    HEME    #Microcytic Anemia  Likely 2/2 AOCD in setting of renal dysfunction. No signs of acute blood loss.   - f/u iron studies B12 and Folate  - Transfuse Hg<7  - Maintain active Type and Screen      Dispo: Pending ICU evaluation. 39M PMH Heroin abuse (on methadone) with spinal cord injury c/b b/l BKA 8-10 years ago now presenting with ?acute renal failure. ICU consulted.    RENAL    #Acute Renal Failure w/ Hyperkalemia (5.4)  Presented with elevated BUN and Cr unknown baseline. Hemodynamically stable, 98% on room air. AGMA 33 in setting of uremia and underlying metabolic alkalosis with respiratory compensation in the setting of chronic nausea and vomiting. Hyperkalemia 5.4 on admission with EKG showing peaked T waves isolate in V3-5, unknown baseline.   - Renal consult for HD  - f/u Hepatitis labs  - f/u AM QuantiFeron/PPD   - f/u HbA1c  - Repeat EKG    #Hypocalcemia  Ca 7.3 on presentation with Alb 2.2 - adjusted calcium for alb 8.7  - Trend CMP  - f/u PTH, Vit D    CARDIOVASCULAR    #Troponemia  Presented with troponemia in setting of ARF with no ischemic changes on EKG. Unlikely ACS.  - Trend Trop to peak  - Repeat EKG  - Consider Echocardiogram    ID    #SIRS 1/2 (tachycardia without leukocytosis, tachypnea or fever) + LLE w/ cellulitis 2/2 ulceration.  s/p bilateral BKA RLE w/ probable chronic limb ischemia and LLE concerning for cellulitis  - s/p vancomycin and zosyn in the ED   - Vascular surgery following recommended IV abx and may do RLE AKA later in hospital course when medically stable  - f/u LE dopplers r/o DVT  - DSD + betadine to B/L LE    NEURO    #Polysubstance Abuse   Recent incarceration, endorses cocaine, heroin (5 days ago) and K2 use. Now on methadone 90mg qd  - confirmed methadone dosing 90mg qd - [phone] [facility] [address], [place] - [id number]  - f/u Utox (if patient able to urinate)  - Monitor for signs of withdrawal.    HEME    #Microcytic Anemia  Likely 2/2 AOCD in setting of renal dysfunction. No signs of acute blood loss.   - f/u iron studies B12 and Folate  - Transfuse Hg<7  - Maintain active Type and Screen      Dispo: 7lach

## 2020-10-12 NOTE — ED PROVIDER NOTE - ATTENDING CONTRIBUTION TO CARE
39 M hx of methadone maintanence BL BKA years ago- currently still abusing heroin  n/v daily for months unable to tolerate po  no abd pain no f/c recent incarceration  smokes K2 daily  L stump swelling x 2 weeks  vss  s1s2 lungs cta bl  abd soft nt nd +bs  L stump swollen with distal ulcer  R stump- cold, but w dopplerable pulse  IMP- N/V- hydration  kidney failure noted

## 2020-10-12 NOTE — H&P ADULT - PROBLEM SELECTOR PLAN 2
+ LLE w/ cellulitis 2/2 ulceration.  s/p bilateral BKA RLE w/ probable chronic limb ischemia and LLE concerning for cellulitis  - s/p vancomycin and zosyn in the ED   - Vascular surgery following recommended IV abx and may do RLE AKA later in hospital course when medically stable  - f/u LE dopplers r/o DVT  - DSD + betadine to B/L LE Presenting with several weeks of nausea and vomiting, no abdominal pain. Likely secondary to uremia.   -QTc 468  -C/w with PRN zofran

## 2020-10-12 NOTE — ED PROVIDER NOTE - CARE PLAN
Principal Discharge DX:	Acute renal failure, unspecified acute renal failure type  Secondary Diagnosis:	Cellulitis  Secondary Diagnosis:	Uremia

## 2020-10-12 NOTE — H&P ADULT - ASSESSMENT
39M Undomiciled PMH Heroin abuse (on methadone) with spinal cord injury c/b b/l BKA 8-10 years ago ([facility]) now presents with worsening fatigue for the last 2 weeks, in the setting of 2 months of nausea and vomiting.

## 2020-10-12 NOTE — CONSULT NOTE ADULT - ASSESSMENT
39y Male PMHx heroin abuse (on methadone) with spinal cord injury c/b b/l BKA 8-10 years ago.    Recommendations:    **PENDING 39y Male PMHx heroin abuse (on methadone) with spinal cord injury c/b b/l BKA 8-10 years ago. RLE w/ probable chronic limb ischemia and LLE w/ cellulitis 2/2 ulceration.    Recommendations:  -Abx  -DSD + betadine to B/L LE  -Vascular following 39y Male PMHx heroin abuse (on methadone) with spinal cord injury c/b b/l BKA 8-10 years ago. RLE w/ probable chronic limb ischemia and LLE w/ cellulitis 2/2 ulceration.    Recommendations:  -IV Abx for LLE cellulitis  -DSD + betadine to B/L LE  -F/u LLE DVT  -May do RLE AKA later in hospital course when medically stable  -Vascular following

## 2020-10-12 NOTE — CONSULT NOTE ADULT - SUBJECTIVE AND OBJECTIVE BOX
Patient is a 39y old  Male who presents with a chief complaint of     HPI:     39M PMHx heroin abuse on methadone x6 months, smokes K2 daily, b/l BKA following an accident, presents to the ED c/o n/v, weakness, tired and decreased urine output over last few days to weeks. Endorses some past renal history but unable to elaborate. Denies abd pain, fever, chills, back pain, CP, SOB, urinary sx or any other acute sx. Denies marijuana abuse or EtOH abuse. Nephrology consulted for JEAN PIERRE.       PAST MEDICAL & SURGICAL HISTORY:  Above-knee amputation of left lower extremity          Allergies:  No Known Allergies      Home Medications:       Hospital Medications:   MEDICATIONS  (STANDING):      SOCIAL HISTORY:  Denies ETOh, Smoking,     Family History:  FAMILY HISTORY:        VITALS:  T(F): 97.7 (10-12-20 @ 14:38), Max: 98 (10-12-20 @ 12:21)  HR: 89 (10-12-20 @ 14:38)  BP: 114/74 (10-12-20 @ 14:38)  RR: 18 (10-12-20 @ 14:38)  SpO2: 96% (10-12-20 @ 14:38)  Wt(kg): --    Height (cm): 167.6 (10-12 @ 12:21)  Weight (kg): 45.4 (10-12 @ 12:21)  BMI (kg/m2): 16.2 (10-12 @ 12:21)  BSA (m2): 1.49 (10-12 @ 12:21)  CAPILLARY BLOOD GLUCOSE          Review of Systems:  CONSTITUTIONAL: No fever or chills. +malaise   RESPIRATORY: No shortness of breath, cough  CARDIOVASCULAR: No Chest pain, shortness of breath   GASTROINTESTINAL: No abdominal pain, +nausea, +vomiting, no diarrhea  MUSCULOSKELETAL: No swelling      PHYSICAL EXAM:  GENERAL: Alert, awake, oriented x3   CHEST/LUNG: Bilateral clear breath sounds  HEART: Regular rate and rhythm, no murmur, no gallops, no rub   ABDOMEN: Soft, nontender, non distended  EXTREMITIES: s/p b/l BKA, non-tender, no edema  Neurology: AAOx3, no asterixis       LABS:  10-12    140  |  79<L>  |  185<H>  ----------------------------<  83  5.4<H>   |  30  |  9.62<H>    Ca    6.8<L>      12 Oct 2020 14:09    TPro  7.2  /  Alb  1.9<L>  /  TBili  0.6  /  DBili      /  AST  22  /  ALT  10  /  AlkPhos  627<H>  10-12    Creatinine Trend: 9.62 <--, 9.92 <--                        11.2   7.33  )-----------( 577      ( 12 Oct 2020 12:57 )             35.4     Urine Studies:                39M PMHx heroin abuse on methadone x6 months, smokes K2 daily, b/l BKA following an accident, presents to the ED c/o n/v, weakness, tired and decreased urine output over last few days to weeks. Nephrology consulted for JEAN PIERRE.     Assessment/Plan:     #JEAN PIERRE   unclear baseline creatinine  possibly JEAN PIERRE on CKD vs baseline CKD   no hx of recent trauma   endorses renal disease but unable to specify  denies hemodialysis history     Recommend:   agree w/IVF resuscitation to achieve euvolemia    STAT toxicology screen, CPK, UA, urine lytes, uPCR   daily CXR   daily BMP and urine lytes   renal sono   davis for strict I/Os   renal diet   no absolute indication for acute RRT     Thank you for the opportunity to participate in the care of your patient. The nephrology service remains available to assist with any questions or concerns. Please feel free to reach us by paging the on-call nephrology fellow for urgent issues or as below.     Serge Wolf M.D.   PGY-4, Nephrology Fellow   C: [phone]   P: [phone] Patient is a 39y old  Male who presents with a chief complaint of     HPI:     39M PMHx heroin abuse on methadone x6 months, smokes K2 daily, b/l BKA following an accident, presents to the ED c/o n/v, weakness, tired and decreased urine output over last few days to weeks. Endorses some past renal history but unable to elaborate. Denies abd pain, fever, chills, back pain, CP, SOB, urinary sx or any other acute sx. Denies marijuana abuse or EtOH abuse. Nephrology consulted for JEAN PIERRE.       PAST MEDICAL & SURGICAL HISTORY:  Above-knee amputation of left lower extremity          Allergies:  No Known Allergies      Home Medications:       Hospital Medications:   MEDICATIONS  (STANDING):      SOCIAL HISTORY:  Denies ETOh, Smoking,     Family History:  FAMILY HISTORY:        VITALS:  T(F): 97.7 (10-12-20 @ 14:38), Max: 98 (10-12-20 @ 12:21)  HR: 89 (10-12-20 @ 14:38)  BP: 114/74 (10-12-20 @ 14:38)  RR: 18 (10-12-20 @ 14:38)  SpO2: 96% (10-12-20 @ 14:38)  Wt(kg): --    Height (cm): 167.6 (10-12 @ 12:21)  Weight (kg): 45.4 (10-12 @ 12:21)  BMI (kg/m2): 16.2 (10-12 @ 12:21)  BSA (m2): 1.49 (10-12 @ 12:21)  CAPILLARY BLOOD GLUCOSE          Review of Systems:  CONSTITUTIONAL: No fever or chills. +malaise   RESPIRATORY: No shortness of breath, cough  CARDIOVASCULAR: No Chest pain, shortness of breath   GASTROINTESTINAL: No abdominal pain, +nausea, +vomiting, no diarrhea  MUSCULOSKELETAL: No swelling      PHYSICAL EXAM:  GENERAL: Alert, awake, oriented x3   CHEST/LUNG: Bilateral clear breath sounds  HEART: Regular rate and rhythm, no murmur, no gallops, no rub   ABDOMEN: Soft, nontender, non distended  EXTREMITIES: s/p b/l BKA, non-tender, no edema  Neurology: AAOx3, no asterixis       LABS:  10-12    140  |  79<L>  |  185<H>  ----------------------------<  83  5.4<H>   |  30  |  9.62<H>    Ca    6.8<L>      12 Oct 2020 14:09    TPro  7.2  /  Alb  1.9<L>  /  TBili  0.6  /  DBili      /  AST  22  /  ALT  10  /  AlkPhos  627<H>  10-12    Creatinine Trend: 9.62 <--, 9.92 <--                        11.2   7.33  )-----------( 577      ( 12 Oct 2020 12:57 )             35.4     Urine Studies:                39M PMHx heroin abuse on methadone x6 months, smokes K2 daily, b/l BKA following an accident, presents to the ED c/o n/v, weakness, tired and decreased urine output over last few days to weeks. Nephrology consulted for JEAN PIERRE.     Assessment/Plan:     #JEAN PIERRE   unclear baseline creatinine  possibly JEAN PIERRE on CKD vs baseline CKD   no hx of recent trauma   endorses renal disease but unable to specify  denies hemodialysis history     Recommend:   agree w/IVF resuscitation to achieve euvolemia    STAT ABG, toxicology screen, CPK, serum Osm, UA, urine lytes (Na, Cr, Urea, Osm), uPCR   daily CXR   daily BMP and urine lytes   renal sono   davis for strict I/Os   renal diet   no absolute indication for acute RRT     Thank you for the opportunity to participate in the care of your patient. The nephrology service remains available to assist with any questions or concerns. Please feel free to reach us by paging the on-call nephrology fellow for urgent issues or as below.     Serge Wolf M.D.   PGY-4, Nephrology Fellow   C: [phone]   P: [phone]

## 2020-10-13 NOTE — PROGRESS NOTE ADULT - ASSESSMENT
39y Male PMHx heroin abuse (on methadone) with spinal cord injury c/b b/l BKA 8-10 years ago. RLE w/ probable chronic limb ischemia and LLE w/ cellulitis 2/2 ulceration.    Recommendations:  -IV Abx for LLE cellulitis  -DSD + betadine to B/L LE  -F/u LLE DVT  -May do RLE AKA later in hospital course when medically stable  -Vascular follo 39y Male PMHx heroin abuse (on methadone) with spinal cord injury c/b b/l BKA 8-10 years ago. RLE w/ probable chronic limb ischemia and LLE w/ cellulitis 2/2 ulceration.    Recommendations:  -IV Abx for LLE cellulitis  -DSD + betadine to B/L LE  -F/u LLE DVT  -May do RLE AKA later in hospital course when medically stable  -Vascular following

## 2020-10-13 NOTE — CONSULT NOTE ADULT - SUBJECTIVE AND OBJECTIVE BOX
Clinical History: ACUTE RENAL FAILURE    Handoff    MEWS Score    Drug abuse    Heroin abuse    Drug abuse    Heroin abuse    Acute renal failure, unspecified acute renal failure type    Acid-base imbalance    Emesis    Nutrition, metabolism, and development symptoms    Elevated troponin    Hypocalcemia    Anemia    Polysubstance abuse    Cellulitis    JEAN PIERRE (acute kidney injury)    Above-knee amputation of right lower extremity    Above-knee amputation of left lower extremity    Above-knee amputation of right lower extremity    VOMITTING    90+    Uremia    Cellulitis    SysAdmin_VisitLink        Meds:cefTRIAXone   IVPB 2000 milliGRAM(s) IV Intermittent every 24 hours  heparin   Injectable 5000 Unit(s) SubCutaneous every 12 hours  methadone    Tablet 90 milliGRAM(s) Oral daily  ondansetron Injectable 4 milliGRAM(s) IV Push every 6 hours PRN      Allergies:No Known Allergies        Labs:                           9.5    6.40  )-----------( 490      ( 13 Oct 2020 05:35 )             30.0       10-13    140  |  81<L>  |  187<H>  ----------------------------<  58<L>  5.8<H>   |  25  |  9.86<H>    Ca    6.6<L>      13 Oct 2020 05:35  Phos  16.8     10-13  Mg     2.7     10-13    TPro  7.7  /  Alb  1.9<L>  /  TBili  0.5  /  DBili  x   /  AST  21  /  ALT  12  /  AlkPhos  614<H>  10-13          Imaging Findings: N/A

## 2020-10-13 NOTE — PROGRESS NOTE ADULT - SUBJECTIVE AND OBJECTIVE BOX
**Incomplete Note**    OVERNIGHT EVENTS:  renal wanted urgent dialysis. Pt uncooperative with HD catheter placement. HD not done.       SUBJECTIVE / INTERVAL HPI: Patient seen and examined at bedside. Patient upset this morning, reports that he is done and wants this to end, asked to be euthanized. Reports to N/V/D, denies chest pain, sob, palpitations.       VITAL SIGNS:  Vital Signs Last 24 Hrs  T(C): 36.8 (13 Oct 2020 06:06), Max: 36.9 (13 Oct 2020 02:00)  T(F): 98.3 (13 Oct 2020 06:06), Max: 98.5 (13 Oct 2020 02:00)  HR: 97 (13 Oct 2020 06:12) (80 - 99)  BP: 99/60 (13 Oct 2020 06:12) (95/53 - 114/76)  BP(mean): 73 (13 Oct 2020 06:12) (69 - 85)  RR: 16 (13 Oct 2020 06:12) (16 - 18)  SpO2: 92% (13 Oct 2020 06:12) (91% - 96%)    PHYSICAL EXAM:    General: WDWN  HEENT: NC/AT; PERRL, anicteric sclera; MMM  Neck: supple  Cardiovascular: +S1/S2; RRR  Respiratory: CTA B/L; no W/R/R  Gastrointestinal: soft, NT/ND; +BSx4  Extremities: WWP; no edema, clubbing or cyanosis, bilateral BKA with wound dressing,  2+ femoral pulses  Vascular: 2+ radial, DP/PT pulses B/L  Neurological: AAOx3; no focal deficits    MEDICATIONS:  MEDICATIONS  (STANDING):  cefTRIAXone   IVPB 2000 milliGRAM(s) IV Intermittent every 24 hours  heparin   Injectable 5000 Unit(s) SubCutaneous every 12 hours  methadone    Tablet 90 milliGRAM(s) Oral daily    MEDICATIONS  (PRN):  ondansetron Injectable 4 milliGRAM(s) IV Push every 6 hours PRN Nausea and/or Vomiting      ALLERGIES:  Allergies    No Known Allergies    Intolerances        LABS:                        9.5    6.40  )-----------( 490      ( 13 Oct 2020 05:35 )             30.0     10-13    140  |  81<L>  |  187<H>  ----------------------------<  58<L>  5.8<H>   |  25  |  9.86<H>    Ca    6.6<L>      13 Oct 2020 05:35  Phos  16.8     10-  Mg     2.7     10-    TPro  7.7  /  Alb  1.9<L>  /  TBili  0.5  /  DBili  x   /  AST  21  /  ALT  12  /  AlkPhos  614<H>  10-13      Urinalysis Basic - ( 12 Oct 2020 18:12 )    Color: Yellow / Appearance: Clear / S.020 / pH: x  Gluc: x / Ketone: Trace mg/dL  / Bili: Small / Urobili: 0.2 E.U./dL   Blood: x / Protein: >=300 mg/dL / Nitrite: NEGATIVE   Leuk Esterase: Trace / RBC: > 10 /HPF / WBC Many /HPF   Sq Epi: x / Non Sq Epi: 0-5 /HPF / Bacteria: Many /HPF      CAPILLARY BLOOD GLUCOSE          RADIOLOGY & ADDITIONAL TESTS: Reviewed.    ASSESSMENT:    PLAN: OVERNIGHT EVENTS:  Renal recommended urgent dialysis. Pt uncooperative with HD catheter placement. HD not done. Schedule with IR today       SUBJECTIVE / INTERVAL HPI: Patient seen and examined at bedside. Patient upset this morning, reports that he is done and wants this to end, asked to be euthanized. Reports to N/V/D, denies chest pain, sob, palpitations.       VITAL SIGNS:  Vital Signs Last 24 Hrs  T(C): 36.8 (13 Oct 2020 06:06), Max: 36.9 (13 Oct 2020 02:00)  T(F): 98.3 (13 Oct 2020 06:06), Max: 98.5 (13 Oct 2020 02:00)  HR: 97 (13 Oct 2020 06:12) (80 - 99)  BP: 99/60 (13 Oct 2020 06:12) (95/53 - 114/76)  BP(mean): 73 (13 Oct 2020 06:12) (69 - 85)  RR: 16 (13 Oct 2020 06:12) (16 - 18)  SpO2: 92% (13 Oct 2020 06:12) (91% - 96%)    PHYSICAL EXAM:    General: WDWN  HEENT: NC/AT; PERRL, anicteric sclera; MMM  Neck: supple  Cardiovascular: +S1/S2; RRR  Respiratory: CTA B/L; no W/R/R  Gastrointestinal: soft, NT/ND; +BSx4  Extremities: WWP; no edema, clubbing or cyanosis, bilateral BKA with wound dressing,    Vascular: 2+ radial, DP/PT pulses B/L  Neurological: AAOx3; no focal deficits    MEDICATIONS:  MEDICATIONS  (STANDING):  cefTRIAXone   IVPB 2000 milliGRAM(s) IV Intermittent every 24 hours  heparin   Injectable 5000 Unit(s) SubCutaneous every 12 hours  methadone    Tablet 90 milliGRAM(s) Oral daily    MEDICATIONS  (PRN):  ondansetron Injectable 4 milliGRAM(s) IV Push every 6 hours PRN Nausea and/or Vomiting      ALLERGIES:  Allergies    No Known Allergies    Intolerances        LABS:                        9.5    6.40  )-----------( 490      ( 13 Oct 2020 05:35 )             30.0     10-13    140  |  81<L>  |  187<H>  ----------------------------<  58<L>  5.8<H>   |  25  |  9.86<H>    Ca    6.6<L>      13 Oct 2020 05:35  Phos  16.8     10-  Mg     2.7     10-    TPro  7.7  /  Alb  1.9<L>  /  TBili  0.5  /  DBili  x   /  AST  21  /  ALT  12  /  AlkPhos  614<H>  10-13      Urinalysis Basic - ( 12 Oct 2020 18:12 )    Color: Yellow / Appearance: Clear / S.020 / pH: x  Gluc: x / Ketone: Trace mg/dL  / Bili: Small / Urobili: 0.2 E.U./dL   Blood: x / Protein: >=300 mg/dL / Nitrite: NEGATIVE   Leuk Esterase: Trace / RBC: > 10 /HPF / WBC Many /HPF   Sq Epi: x / Non Sq Epi: 0-5 /HPF / Bacteria: Many /HPF      CAPILLARY BLOOD GLUCOSE          RADIOLOGY & ADDITIONAL TESTS: Reviewed.    ASSESSMENT:    PLAN: Hospital Course:    Patient is a 39M Undomiciled PMH Heroin abuse (on methadone) with spinal cord injury c/b b/l BKA 8-10 years ago ([place]) presents with worsening fatigue for the last 2 weeks, in the setting of 2 months of nausea, vomiting and diarrhea. Denies fever, chest pain, changes in mental status, cough, hemoptysis, weight loss, night sweats. Of note patient reports that he was recently incarcerated for 6 months and was released in July of this year, he reports that he had blood work done and there was no mention of renal dysfunction. He does not follow up with a primary care physician and does not take any medication other than methadone [phone]. He last used heroin 5 days ago but is making a concerted effort to stop using. In the ED VS T 98 HR 99 /76 R16 96% on RA  Labs: WBC 7 | Hg/Hct 11/35 MCV 77 | Plt 577 | Na 140 K 5.4 | Co2 31 AG 33 BUN/ Cr 192/9.92 | TP/ Alb 8.8/ 2/2 | AST/ALT 22/10  | EKG: NSR Qtc 468 peaked T waves in V3-V4 unknown baseline, LVH, CXR - no focal consolidations. Pt was seen by [facility] for probable chronic limb ischemia on RLE and cellulitis on LLE. He was started on Vanc and zosyn     In 7Lach: D/c Vanc and zosyn, started on CTX for both cellulites and UTI. Pt had HD port placed by IR, had HD session.       OVERNIGHT EVENTS:  Renal recommended urgent dialysis. Pt uncooperative with HD catheter placement. HD not done. Schedule with IR today       SUBJECTIVE / INTERVAL HPI: Patient seen and examined at bedside. Patient upset this morning, reports that he is done and wants this to end.  Reports to N/V/D, denies chest pain, sob, palpitations.       VITAL SIGNS:  Vital Signs Last 24 Hrs  T(C): 36.8 (13 Oct 2020 06:06), Max: 36.9 (13 Oct 2020 02:00)  T(F): 98.3 (13 Oct 2020 06:06), Max: 98.5 (13 Oct 2020 02:00)  HR: 97 (13 Oct 2020 06:12) (80 - 99)  BP: 99/60 (13 Oct 2020 06:12) (95/53 - 114/76)  BP(mean): 73 (13 Oct 2020 06:12) (69 - 85)  RR: 16 (13 Oct 2020 06:12) (16 - 18)  SpO2: 92% (13 Oct 2020 06:12) (91% - 96%)    PHYSICAL EXAM:    General: WDWN  HEENT: NC/AT; PERRL, anicteric sclera; MMM  Neck: supple  Cardiovascular: +S1/S2; RRR  Respiratory: CTA B/L; no W/R/R  Gastrointestinal: soft, NT/ND; +BSx4  Extremities: WWP; no edema, clubbing or cyanosis, bilateral BKA with wound dressing,    Vascular: 2+ radial, DP/PT pulses B/L  Neurological: AAOx3; no focal deficits    MEDICATIONS:  MEDICATIONS  (STANDING):  cefTRIAXone   IVPB 2000 milliGRAM(s) IV Intermittent every 24 hours  heparin   Injectable 5000 Unit(s) SubCutaneous every 12 hours  methadone    Tablet 90 milliGRAM(s) Oral daily    MEDICATIONS  (PRN):  ondansetron Injectable 4 milliGRAM(s) IV Push every 6 hours PRN Nausea and/or Vomiting      ALLERGIES:  Allergies    No Known Allergies    Intolerances        LABS:                        9.5    6.40  )-----------( 490      ( 13 Oct 2020 05:35 )             30.0     10-13    140  |  81<L>  |  187<H>  ----------------------------<  58<L>  5.8<H>   |  25  |  9.86<H>    Ca    6.6<L>      13 Oct 2020 05:35  Phos  16.8     10-13  Mg     2.7     10-13    TPro  7.7  /  Alb  1.9<L>  /  TBili  0.5  /  DBili  x   /  AST  21  /  ALT  12  /  AlkPhos  614<H>  10-13      Urinalysis Basic - ( 12 Oct 2020 18:12 )    Color: Yellow / Appearance: Clear / S.020 / pH: x  Gluc: x / Ketone: Trace mg/dL  / Bili: Small / Urobili: 0.2 E.U./dL   Blood: x / Protein: >=300 mg/dL / Nitrite: NEGATIVE   Leuk Esterase: Trace / RBC: > 10 /HPF / WBC Many /HPF   Sq Epi: x / Non Sq Epi: 0-5 /HPF / Bacteria: Many /HPF      CAPILLARY BLOOD GLUCOSE          RADIOLOGY & ADDITIONAL TESTS: Reviewed.    ASSESSMENT:    PLAN:

## 2020-10-13 NOTE — PROGRESS NOTE ADULT - SUBJECTIVE AND OBJECTIVE BOX
Patient is a 39y Male seen and evaluated at bedside. Uraemic- c/o n/v. Anaemia worsening. Severe hyperphosphataemia. Denies any oral phos supplements.    Meds:    cefTRIAXone   IVPB 2000 every 24 hours  heparin   Injectable 5000 every 12 hours  methadone    Tablet 90 daily  ondansetron Injectable 4 every 6 hours PRN      T(C): , Max: 36.9 (10-13-20 @ 02:00)  T(F): , Max: 98.5 (10-13-20 @ 02:00)  HR: 85 (10-13-20 @ 13:04)  BP: 99/61 (10-13-20 @ 13:04)  BP(mean): 74 (10-13-20 @ 13:04)  RR: 18 (10-13-20 @ :04)  SpO2: 98% (10-13-20 @ :04)  Wt(kg): --        Review of Systems: +nausea, vomiting; waxing and waning mentation.      PHYSICAL EXAM:  GENERAL: Mentation fluctuating, non bloody emesis  CHEST/LUNG: Clear to auscultation bilaterally  HEART: normal S1S2, RRR  ABDOMEN: Soft, Nontender, non distended  EXTREMITIES: B/l amputations, no oedema    LABS:                        9.5    6.40  )-----------( 490      ( 13 Oct 2020 05:35 )             30.0     10-13    140  |  81<L>  |  187<H>  ----------------------------<  58<L>  5.8<H>   |  25  |  9.86<H>    Ca    6.6<L>      13 Oct 2020 05:35  Phos  16.8     10-13  Mg     2.7     10-13    TPro  7.7  /  Alb  1.9<L>  /  TBili  0.5  /  DBili  x   /  AST  21  /  ALT  12  /  AlkPhos  614<H>  10-13    Hepatitis B Surface Antibody: Reactive (10-13 @ 05:35)  Hepatitis C Virus S/CO Ratio: 14.19 S/CO (10-13 @ 05:35)      Urinalysis Basic - ( 12 Oct 2020 18:12 )    Color: Yellow / Appearance: Clear / S.020 / pH: x  Gluc: x / Ketone: Trace mg/dL  / Bili: Small / Urobili: 0.2 E.U./dL   Blood: x / Protein: >=300 mg/dL / Nitrite: NEGATIVE   Leuk Esterase: Trace / RBC: > 10 /HPF / WBC Many /HPF   Sq Epi: x / Non Sq Epi: 0-5 /HPF / Bacteria: Many /HPF      Creatinine, Random Urine: 36 mg/dL (10-12 @ 18:12)  Sodium, Random Urine: 46 mmol/L (10-12 @ 18:12)  Chloride, Random Urine: <20 mmol/L (10-12 @ 18:12)  Osmolality, Random Urine: 332 mosm/kg (10-12 @ 18:12)        RADIOLOGY & ADDITIONAL STUDIES:           Patient is a 39y Male seen and evaluated at bedside. Uraemic- c/o n/v. Anaemia worsening. Severe hyperphosphataemia. Denies any oral phos supplements.    Meds:    cefTRIAXone   IVPB 2000 every 24 hours  heparin   Injectable 5000 every 12 hours  methadone    Tablet 90 daily  ondansetron Injectable 4 every 6 hours PRN      T(C): , Max: 36.9 (10-13-20 @ 02:00)  T(F): , Max: 98.5 (10-13-20 @ 02:00)  HR: 85 (10-13-20 @ 13:04)  BP: 99/61 (10-13-20 @ 13:04)  BP(mean): 74 (10-13-20 @ 13:04)  RR: 18 (10-13-20 @ :04)  SpO2: 98% (10-13-20 @ 13:04)  Wt(kg): --        Review of Systems: +nausea, vomiting; waxing and waning mentation.      PHYSICAL EXAM:  GENERAL: Mentation fluctuating, non bloody emesis; emaciated, reduced skin turgour  CHEST/LUNG: Clear to auscultation bilaterally  HEART: normal S1S2, RRR  ABDOMEN: Soft, Nontender, non distended  EXTREMITIES: B/l amputations, no oedema    LABS:                        9.5    6.40  )-----------( 490      ( 13 Oct 2020 05:35 )             30.0     10-13    140  |  81<L>  |  187<H>  ----------------------------<  58<L>  5.8<H>   |  25  |  9.86<H>    Ca    6.6<L>      13 Oct 2020 05:35  Phos  16.8     10-13  Mg     2.7     10-13    TPro  7.7  /  Alb  1.9<L>  /  TBili  0.5  /  DBili  x   /  AST  21  /  ALT  12  /  AlkPhos  614<H>  10-13    Hepatitis B Surface Antibody: Reactive (10-13 @ 05:35)  Hepatitis C Virus S/CO Ratio: 14.19 S/CO (10-13 @ 05:35)      Urinalysis Basic - ( 12 Oct 2020 18:12 )    Color: Yellow / Appearance: Clear / S.020 / pH: x  Gluc: x / Ketone: Trace mg/dL  / Bili: Small / Urobili: 0.2 E.U./dL   Blood: x / Protein: >=300 mg/dL / Nitrite: NEGATIVE   Leuk Esterase: Trace / RBC: > 10 /HPF / WBC Many /HPF   Sq Epi: x / Non Sq Epi: 0-5 /HPF / Bacteria: Many /HPF      Creatinine, Random Urine: 36 mg/dL (10-12 @ 18:12)  Sodium, Random Urine: 46 mmol/L (10-12 @ 18:12)  Chloride, Random Urine: <20 mmol/L (10-12 @ 18:12)  Osmolality, Random Urine: 332 mosm/kg (10-12 @ 18:12)        RADIOLOGY & ADDITIONAL STUDIES:

## 2020-10-13 NOTE — PROGRESS NOTE ADULT - PROBLEM SELECTOR PLAN 8
Presented with troponemia in setting of ARF with no ischemic changes on EKG and elevated proBNP of 7428. Unlikely ACS, probably in setting of JEAN PIERRE.  - Trend Trop to peak  - F/u Echocardiogram Ca 7.3 on presentation with Alb 2.2 - adjusted calcium for alb 8.7  - Trend CMP  - f/u PTH, Vit D Ca 7.3 on presentation with Alb 2.2 - adjusted calcium for alb 8.7  - , Vitamin D 1,25 11.2   -Nephro following, appre recs

## 2020-10-13 NOTE — PROGRESS NOTE ADULT - PROBLEM SELECTOR PLAN 6
Likely 2/2 ACD in setting of renal dysfunction. No signs of acute blood loss.   - f/u iron studies B12 and Folate  - Transfuse Hg<7  - Maintain active Type and Screen Recent incarceration, endorses cocaine, heroin (5 days ago) and K2 use. Now on methadone 90mg qd  - confirmed methadone dosing 90mg qd - [phone] [facility] [address], [place] - [id number]  -C/w with methadone 90mg qd  - F/u Utox positive for opiods, cocaine, benzos and methadone   - F/u HIV neg and hepatitis panel  with positive Hep C antibody   - F/u TB quant   - Monitor for signs of withdrawal. Recent incarceration, endorses cocaine, heroin (5 days ago) and K2 use. Now on methadone 90mg qd  - confirmed methadone dosing 90mg qd - [phone] [facility] [address], [place] - [id number]  -C/w with methadone 90mg qd  -  Utox positive for opiods, cocaine, benzos and methadone   - HIV neg, positive Hep C antibody   - F/u TB quant   - Monitor for signs of withdrawal.

## 2020-10-13 NOTE — CONSULT NOTE ADULT - SUBJECTIVE AND OBJECTIVE BOX
GASTROENTEROLOGY CONSULT NOTE  HPI:    39M Undomiciled PMH Heroin abuse (on methadone) with spinal cord injury c/b b/l BKA 8-10 years ago ([facility]) now presents with worsening fatigue for the last 2 weeks, in the setting of 2 months of nausea and vomiting. Denies fever, chest pain, changes in mental status, cough, hemoptysis, weight loss, night sweats. Of note patient reports that he was recently incarcerated for 6 months and was released in July of this year, he reports that he had blood work done and there was no mention of renal dysfunction. He does not follow up with a primary care physician and does not take any medication other than methadone [phone]. He last used heroin 5 days ago but is making a concerted effort to stop using.        In the ED VS T 98 HR 99 /76 R16 96% on RA  Labs: WBC 7 | Hg/Hct 11/35 MCV 77 | Plt 577 | Na 140 K 5.4 | Co2 31 AG 33 BUN/ Cr 192/9.92 | TP/ Alb 8.8/ 2/2 | AST/ALT 22/10  |  EKG: NSR Qtc 468 peaked T waves in V3-V5 unknown baseline, LVH.  CXR - no focal consolidations    ED Course: Famotidine 20, Zofran 4 mg and 2L NS. Nephrology and Vacular Surgery consulted      (12 Oct 2020 17:44)    Allergies    No Known Allergies    Intolerances      Home Medications:    MEDICATIONS:  MEDICATIONS  (STANDING):  cefTRIAXone   IVPB 2000 milliGRAM(s) IV Intermittent every 24 hours  heparin   Injectable 5000 Unit(s) SubCutaneous every 12 hours  methadone    Tablet 90 milliGRAM(s) Oral daily  sodium chloride 0.9% Bolus 1000 milliLiter(s) IV Bolus once    MEDICATIONS  (PRN):  ondansetron Injectable 4 milliGRAM(s) IV Push every 6 hours PRN Nausea and/or Vomiting    PAST MEDICAL & SURGICAL HISTORY:  Drug abuse  K2    Heroin abuse    Above-knee amputation of right lower extremity    Above-knee amputation of left lower extremity      FAMILY HISTORY:    SOCIAL HISTORY:  [place]: [ ] Current, [ ] Former, [ ] Never; Pack Years:  Alcohol:  Illicit Drugs:    REVIEW OF SYSTEMS:  CONSTITUTIONAL: No fevers or chills  HEENT: No visual changes; No vertigo or throat pain   NECK: No pain or stiffness  RESPIRATORY: No cough, wheezing, hemoptysis; No shortness of breath  CARDIOVASCULAR: No chest pain or palpitations  GASTROINTESTINAL: No abdominal or epigastric pain. Positive for diarrhea. No constipation. No melena or hematochezia.  GENITOURINARY: No dysuria, frequency or hematuria  NEUROLOGICAL: paraplegia  SKIN: No itching, burning, LLE cellulitis  All other 10 review of systems is negative unless indicated above.    Vital Signs Last 24 Hrs  T(C): 35.8 (13 Oct 2020 19:40), Max: 36.9 (13 Oct 2020 02:00)  T(F): 96.4 (13 Oct 2020 19:40), Max: 98.5 (13 Oct 2020 02:00)  HR: 93 (13 Oct 2020 21:29) (74 - 97)  BP: 110/67 (13 Oct 2020 21:29) (97/61 - 131/80)  BP(mean): 83 (13 Oct 2020 21:29) (72 - 100)  RR: 17 (13 Oct 2020 21:29) (16 - 18)  SpO2: 96% (13 Oct 2020 21:29) (92% - 99%)    10-13 @ 07:01  -  10-13 @ 23:03  --------------------------------------------------------  IN: 1400 mL / OUT: 400 mL / NET: 1000 mL        PHYSICAL EXAM:    General: malnourished; in no acute distress  Eyes: Anicteric sclerae, moist conjunctivae  HENT: Moist mucous membranes  Neck: Trachea midline, supple  Lungs: Normal respiratory effort, no intercostal retractions  Cardiovascular: RRR  Abdomen: Soft, non-tender non-distended; No rebound or guarding  Extremities: limited range of motion  Neurological: Alert and oriented x3  Skin: Warm and dry. No obvious rash    LABS:                        9.5    6.40  )-----------( 490      ( 13 Oct 2020 05:35 )             30.0     10-13    140  |  79<L>  |  196<H>  ----------------------------<  59<L>  6.0<H>   |  25  |  10.19<H>    Ca    6.6<L>      13 Oct 2020 19:11  Phos  17.0     10-13  Mg     2.7     10-13    TPro  7.7  /  Alb  1.9<L>  /  TBili  0.5  /  DBili  x   /  AST  21  /  ALT  12  /  AlkPhos  614<H>  10-13            RADIOLOGY & ADDITIONAL STUDIES:     Reviewed

## 2020-10-13 NOTE — PROGRESS NOTE ADULT - PROBLEM SELECTOR PLAN 1
Presented with decreased urine output over last few days to weeks with elevated BUN/ Cr 192/9.9,  unknown baseline.   -S/p 2L of NS in ED  -C/w with IVF   -F/u renal ultrasound  -F/u urine lytes   -Strict I&O  -Trend Cr   -Renal consulted, follow recs Presented with decreased urine output over last few days to weeks with elevated BUN/ Cr 192/9.9,  unknown baseline.   -S/p 2L of NS in ED  -F/u renal ultrasound read  -F/u urine lytes showed post obstructive JEAN PIERRE  -Strict I&O  -Trend Cr   -Renal consulted, follow recs  -Scheduled for IR HD cath placement and HD later today Presented with decreased urine output over last few days to weeks with elevated BUN/ Cr 192/9.9,  unknown baseline.   -S/p 2L of NS in ED  -renal ultrasound showed Mild right calyectasis without dilatation of the renal pelvis and medical renal disease.   -urine lytes showed post obstructive JEAN PIERRE  -Strict I&O  -Trend Cr   - Nephro following, appre recs  -Right subclavian HD cath by IR and HD later today

## 2020-10-13 NOTE — PROGRESS NOTE ADULT - SUBJECTIVE AND OBJECTIVE BOX
Subjective: Pt endorsing pain to the bilateral amputation sites.     Vital Signs Last 24 Hrs  T(C): 36.8 (13 Oct 2020 06:06), Max: 36.9 (13 Oct 2020 02:00)  T(F): 98.3 (13 Oct 2020 06:06), Max: 98.5 (13 Oct 2020 02:00)  HR: 97 (13 Oct 2020 06:12) (80 - 99)  BP: 99/60 (13 Oct 2020 06:12) (95/53 - 114/76)  BP(mean): 73 (13 Oct 2020 06:12) (69 - 85)  RR: 16 (13 Oct 2020 06:12) (16 - 18)  SpO2: 92% (13 Oct 2020 06:12) (91% - 96%)    I&O's Summary      Physical Exam:  General: NAD, resting comfortably  HEENT: NC/AT, EOMI, normal hearing, no carotid bruits  Pulmonary: normal resp effort, CTA-B  Cardiovascular: NSR  Abdominal: soft, NT/ND, no organomegaly  Extremities: R distal limb warm. Ulcer noted at distal limb w/ eschar. L distal limb with swelling receding. Ulcer noted at distal limb. No notable purulence/pus.  Neuro: A/O x 3, CNs II-XII grossly intact, normal sensation, no focal deficits  Pulses:   Right:  FEM [x ]2+ [ ]1+ [ ]doppler    POP non-dopplerable    Left:  FEM [x]2+ [ ]1+ [ ]doppler  POP [ ]2+ [ ]1+ [x]doppler  LABS:                        9.5    6.40  )-----------( 490      ( 13 Oct 2020 05:35 )             30.0     10-13    140  |  81<L>  |  187<H>  ----------------------------<  58<L>  5.8<H>   |  25  |  9.86<H>    Ca    6.6<L>      13 Oct 2020 05:35  Phos  16.8     10-13  Mg     2.7     10-13    TPro  7.7  /  Alb  1.9<L>  /  TBili  0.5  /  DBili  x   /  AST  21  /  ALT  12  /  AlkPhos  614<H>  10-13      Urinalysis Basic - ( 12 Oct 2020 18:12 )    Color: Yellow / Appearance: Clear / S.020 / pH: x  Gluc: x / Ketone: Trace mg/dL  / Bili: Small / Urobili: 0.2 E.U./dL   Blood: x / Protein: >=300 mg/dL / Nitrite: NEGATIVE   Leuk Esterase: Trace / RBC: > 10 /HPF / WBC Many /HPF   Sq Epi: x / Non Sq Epi: 0-5 /HPF / Bacteria: Many /HPF      LIVER FUNCTIONS - ( 13 Oct 2020 05:35 )  Alb: 1.9 g/dL / Pro: 7.7 g/dL / ALK PHOS: 614 U/L / ALT: 12 U/L / AST: 21 U/L / GGT: x

## 2020-10-13 NOTE — PROGRESS NOTE ADULT - PROBLEM SELECTOR PLAN 4
Presenting with LLE cellulitis 2/2 ulceration. s/p bilateral BKA RLE w/ probable chronic limb ischemia and LLE concerning for cellulitis  - s/p vancomycin and zosyn in the ED   - Vascular surgery following recommended IV abx and may do RLE AKA later in hospital course when medically stable  -C/w with vancomycin   - f/u LE dopplers r/o DVT  - C/w with wound care     #UTI   -UA+  -C/w with CTX 2mg qd Presenting with LLE cellulitis 2/2 ulceration. s/p bilateral BKA RLE w/ probable chronic limb ischemia and LLE concerning for cellulitis  - s/p vancomycin and zosyn in the ED   - Vascular surgery following recommended IV abx and may do RLE AKA later in hospital course when medically stable  - LE dopplers r/o DVT showed no DVT   - C/w with wound care   - C/w with CTX 2mg     #UTI   -UA+  -C/w with CTX 2mg qd

## 2020-10-13 NOTE — PROGRESS NOTE ADULT - PROBLEM SELECTOR PLAN 3
AGMA in setting of uremia and underlying metabolic alkalosis with respiratory compensation in the setting of chronic nausea and vomiting.     #Hyperkalemia   Hyperkalemia 5.4 on admission with EKG showing peaked T waves isolate in V4, unknown baseline.  -Trend BMP Presents with AGMA in setting of uremia and underlying metabolic alkalosis with respiratory compensation in the setting of chronic nausea and vomiting.     #Hyperkalemia   Hyperkalemia 5.4 on admission with EKG showing peaked T waves isolate in V4, unknown baseline.  -S/p Lokelma   -Trend BMP  -Plan for HD today Presents with AGMA in setting of uremia and underlying metabolic alkalosis with respiratory compensation in the setting of chronic nausea and vomiting.     #Hyperkalemia   Hyperkalemia 5.4 on admission with EKG showing peaked T waves isolate in V4, unknown baseline.  -S/p Lokelma   -Trend BMP  -Follow up repeat BMP after HD

## 2020-10-13 NOTE — PROGRESS NOTE ADULT - ASSESSMENT
39M Undomiciled PMH Heroin abuse (on methadone) with spinal cord injury c/b b/l BKA 8-10 years ago ([facility]) now presents with worsening fatigue for the last 2 weeks, in the setting of 2 months of nausea and vomiting. 39M Undomiciled PMH Heroin abuse (on methadone) with spinal cord injury c/b b/l BKA 8-10 years ago ([facility]) now presents with worsening fatigue for the last 2 weeks, in the setting of 2 months of nausea, vomiting and diarrhea, admitted to [facility]  with JEAN PIERRE for HD.

## 2020-10-13 NOTE — PROGRESS NOTE ADULT - ASSESSMENT
39M PMHx heroin abuse on methadone x6 months, smokes K2 daily, b/l BKA following an accident, presents with ARF, uraemia and hyperphosphataemia.    Acute renal failure, uraemia  BUN/creat- 187/9.86  Unclear aetiology, polysubstance abuse- cocaine, methadone, opiate, benzo +Utox  UA +blood, RBCs, prot, bacteria  R/o GN    Electrolyte derangements- hyperK, hyperphos    No volume overload    INCOMPLETE    #JEAN PIERRE   unclear baseline creatinine  possibly JEAN PIERRE on CKD vs baseline CKD   no hx of recent trauma   endorses renal disease but unable to specify  denies hemodialysis history     Recommend:   agree w/IVF resuscitation to achieve euvolemia    STAT ABG, toxicology screen, CPK, serum Osm, UA, urine lytes (Na, Cr, Urea, Osm), uPCR   daily CXR   daily BMP and urine lytes   renal sono   davis for strict I/Os   renal diet   no absolute indication for acute RRT    39M PMHx heroin abuse on methadone x6 months, smokes K2 daily, b/l BKA following an accident, presents with ARF, uraemia and hyperphosphataemia.    #Acute renal failure, uraemia, oliguria-anuria  BUN/creat- 187/9.86  Unclear aetiology, polysubstance abuse- cocaine, methadone, opiate, benzo +Utox; possible toxic ingestion? +Osmol gap  Rhabdo r/o, no evidence of obstruction, renal/bladder sono report pending  UA +blood, RBCs, prot, bacteria; FeNa 9.01%  Urine output 45cc in 24 hours  R/o GN- obtain C3,C4, cryo, antiGBM, TITI, antidsDNA, RhemF, ser MAMADOU, UPEP, urine immonofixation  Check uric acid for possible TLS and urine protein/creat ratio    #Electrolyte derangements- hyperK, hyperphos  #Clinically hypovolaemic- will give 1L saline during HD    #HAGMA from renal failure  #Metalkolsis- low urine chlorids <20- GIT losses, likely 2/2 emesis  #Resp alk?Compensation? pH 7.4, pCO2 48    #Anaemia- check iron panel, ferritin  #BMD- hyperphos, low iCa- no repletion at this point, will manage with HD.

## 2020-10-13 NOTE — CONSULT NOTE ADULT - ASSESSMENT
Assessment: if bedside cannot be performed by medicine, then can perform in IR        Recommendations: will contact team

## 2020-10-13 NOTE — PROGRESS NOTE ADULT - PROBLEM SELECTOR PLAN 5
Recent incarceration, endorses cocaine, heroin (5 days ago) and K2 use. Now on methadone 90mg qd  - confirmed methadone dosing 90mg qd - [phone] [facility] [address], [place] - [id number]  -C/w with methadone 90mg qd  - F/u Utox positive for opiods, cocaine, benzos and methadone   - F/u HIV and hepatitis panel   - F/u TB quant   - Monitor for signs of withdrawal. Patient Hep antibody +, hep B and HIV neg  -Elevated Alkphos, AST/ALT wnl   -F/u GGT      #Diarrhea  -Patient with 2 months of diarrhea, denies antibiotic use  -F/u Stool PCR and C-Diff  -Consult GI, appre recs

## 2020-10-13 NOTE — PROGRESS NOTE ADULT - PROBLEM SELECTOR PLAN 2
Presenting with several weeks of nausea and vomiting, no abdominal pain. Likely secondary to uremia.   -QTc 468  -C/w with PRN zofran

## 2020-10-13 NOTE — PROGRESS NOTE ADULT - PROBLEM SELECTOR PLAN 7
Ca 7.3 on presentation with Alb 2.2 - adjusted calcium for alb 8.7  - Trend CMP  - f/u PTH, Vit D Likely 2/2 ACD in setting of renal dysfunction. No signs of acute blood loss.   - f/u iron studies B12 and Folate  - Transfuse Hg<7  - Maintain active Type and Screen Likely 2/2 ALEX vs ACD in setting of renal dysfunction. No signs of acute blood loss.   - B12 elevated 1793 and Folate 13, f/u iron studies   - Transfuse Hg<7  - Maintain active Type and Screen

## 2020-10-13 NOTE — PROGRESS NOTE ADULT - PROBLEM SELECTOR PLAN 9
F: C/w IVF   E: Replete electrolytes as needed, K>4, M>2  N: Renal Diet   P: Heparin Subq   D: 7Lach Presented with troponemia in setting of ARF with no ischemic changes on EKG and elevated proBNP of 7428. Unlikely ACS, probably in setting of JEAN PIERRE.  - Trend Trop to peak  - F/u Echocardiogram Presented with troponemia in setting of ARF with no ischemic changes on EKG and elevated proBNP of 7428. Unlikely ACS, probably in setting of JEAN PIERRE.  -Trend Trop to peak  -TTE normal with EF 64%

## 2020-10-13 NOTE — PROGRESS NOTE ADULT - ATTENDING COMMENTS
I independently performed the key portions of the evaluation and management service provided. I agree with the above history, physical, and plan which I have reviewed and edited where appropriate. I find unexplained JEAN PIERRE, severe hyperphos and high urate. Dialysis today. Would check abd CT to evalute for evidence of tumor lysis syndrome (though this seems unlikely). Benefits of uric acid lowering are uncertain. Will discuss. See full note. (Patient seen earlier in day.)

## 2020-10-13 NOTE — CONSULT NOTE ADULT - ASSESSMENT
39M Undomiciled PMH Heroin abuse (on methadone) with spinal cord injury c/b b/l DIANE presents with worsening fatigue, nausea and vomiting x 2 months. Found to severe JEAN PIERRE with Cr >9 as well as LLE cellulitis sepsis, He last used heroin 5 days before admission. GI is consulted as hep C ab returned positive. ALP elevated to 614 rest LT normal    # Chronic Hepatitis C  Per pt he is known to have hep c and never pursued therapy for the same  -may sent Hep C VL and genotype, however discussion regarding treatment would be outpatient    # elevation in ALP and GGT  likely from cholestatis of sepsis vs DILI  - continue to trend  - obtain US liver with doppler    # Hypoalbuminemia  2/2 malnutrition  - nutrition evalution   39M Undomiciled PMH Heroin abuse (on methadone) with spinal cord injury c/b b/l DIANE presents with worsening fatigue, nausea and vomiting x 2 months. Found to severe JEAN PIERRE with Cr >9 as well as LLE cellulitis sepsis, He last used heroin 5 days before admission. GI is consulted as hep C ab returned positive. ALP elevated to 614 rest LT normal    # Chronic Hepatitis C  Per pt he is known to have hep c and never pursued therapy for the same  -may sent Hep C VL and genotype, however discussion regarding treatment would be outpatient  - outpatient follow up    # elevation in ALP and GGT  likely from cholestatis of sepsis vs DILI  - continue to trend  - obtain US liver with doppler  - outpatient follow up    # Hypoalbuminemia  2/2 malnutrition  - nutrition evalution   39M Undomiciled PMH Heroin abuse (on methadone) with spinal cord injury c/b b/l DIANE presents with worsening fatigue, nausea and vomiting x 2 months. Found to severe JEAN PIERRE with Cr >9 as well as LLE cellulitis sepsis, He last used heroin 5 days before admission. GI is consulted as hep C ab returned positive. ALP elevated to 614 rest LT normal    # Chronic Hepatitis C  Per pt he is known to have hep c and never pursued therapy for the same  -may sent Hep C VL and genotype, however discussion regarding treatment would be outpatient  - outpatient follow up    # elevation in ALP and GGT  likely from cholestatis of sepsis vs DILI  - continue to trend  - obtain US liver with doppler  - outpatient follow up    # Hypoalbuminemia  2/2 malnutrition  - nutrition evalution    will sign off. Call back PRN    Recommendations discussed with primary team  Plan discussed with GI service attending    Aung Meza MD  PGY-4 GI fellow  Pager: [phone]

## 2020-10-14 NOTE — PROGRESS NOTE ADULT - PROBLEM SELECTOR PLAN 5
Patient Hep antibody +, hep B and HIV neg  -Elevated Alkphos, AST/ALT wnl   - elev   - continue to trend LFTs

## 2020-10-14 NOTE — PROGRESS NOTE ADULT - PROBLEM SELECTOR PLAN 7
Normocytic anemia most likely 2/2 ACD vs ALEX in setting of renal dysfunction. No signs of acute blood loss.   - B12 elevated 1793 and Folate 13, iron studies consistent with mixed picture ALEX and ACD   - Transfuse Hg<7  - Maintain active Type and Screen

## 2020-10-14 NOTE — PROGRESS NOTE ADULT - ATTENDING COMMENTS
I independently performed the key portions of the evaluation and management service provided. I agree with the above history, physical, and plan which I have reviewed and edited where appropriate. I find JEAN PIERRE, started on dialysis. Severe hyperphosphatemia and hyperuricemia. R/O occult necrotic tumor (probable hepatocellular). Continue dialysis. Rx with rasburicase given possibility of reversible uric acid nephropathy.  See full note. (Patient seen earlier in day.)

## 2020-10-14 NOTE — PROGRESS NOTE ADULT - PROBLEM SELECTOR PLAN 4
-Patient with 2 months of diarrhea, denies antibiotic use  -F/u Stool PCR and C-Diff  -Consult GI, appreciate recs

## 2020-10-14 NOTE — PROGRESS NOTE ADULT - SUBJECTIVE AND OBJECTIVE BOX
Patient seen and examined at bedside. Patient has no complaints.    cefTRIAXone   IVPB 2000  cefTRIAXone   IVPB 2000  heparin   Injectable 5000    Vital Signs Last 24 Hrs  T(C): 37 (14 Oct 2020 09:32), Max: 37 (14 Oct 2020 09:32)  T(F): 98.6 (14 Oct 2020 09:32), Max: 98.6 (14 Oct 2020 09:32)  HR: 93 (14 Oct 2020 09:32) (82 - 96)  BP: 123/73 (14 Oct 2020 09:32) (99/61 - 123/73)  BP(mean): 83 (13 Oct 2020 21:29) (72 - 83)  RR: 18 (14 Oct 2020 09:32) (17 - 18)  SpO2: 94% (14 Oct 2020 09:32) (94% - 99%)  I&O's Summary    13 Oct 2020 07:01  -  14 Oct 2020 07:00  --------------------------------------------------------  IN: 1700 mL / OUT: 650 mL / NET: 1050 mL        Physical Exam:  General: NAD, resting comfortably in bed  Pulmonary: Nonlabored breathing, no respiratory distress  Cardiovascular: s1s2 rrr  Abdominal: soft nt/nd  Extremities: R distal limb warm. Ulcer noted at distal limb w/ eschar. L distal limb with swelling receding. Ulcer noted at distal limb. No notable purulence/pus.  Neuro: A/O x 3, CNs II-XII grossly intact, normal sensation, no focal deficits      LABS:                        8.8    6.61  )-----------( 368      ( 14 Oct 2020 07:32 )             29.7     10-14    139  |  92<L>  |  88<H>  ----------------------------<  65<L>  4.6   |  25  |  6.13<H>    Ca    7.8<L>      14 Oct 2020 07:31  Phos  9.0     10-14  Mg     2.2     10-14    TPro  7.2  /  Alb  1.7<L>  /  TBili  0.4  /  DBili  x   /  AST  21  /  ALT  11  /  AlkPhos  598<H>  10-14

## 2020-10-14 NOTE — DIETITIAN INITIAL EVALUATION ADULT. - PROBLEM SELECTOR PLAN 3
AGMA in setting of uremia and underlying metabolic alkalosis with respiratory compensation in the setting of chronic nausea and vomiting.     #Hyperkalemia   Hyperkalemia 5.4 on admission with EKG showing peaked T waves isolate in V4, unknown baseline.  -Trend BMP

## 2020-10-14 NOTE — DIETITIAN INITIAL EVALUATION ADULT. - ADD RECOMMEND
1. Recommend renal diet +Nepro BID (each 425kcal/19gpro) 2. Continue antiemetic 3. Trend wts pre/post HD 4. Monitor lytes and replete 5. Add MVI

## 2020-10-14 NOTE — DIETITIAN INITIAL EVALUATION ADULT. - PROBLEM SELECTOR PLAN 4
Presenting with LLE cellulitis 2/2 ulceration. s/p bilateral BKA RLE w/ probable chronic limb ischemia and LLE concerning for cellulitis  - s/p vancomycin and zosyn in the ED   - Vascular surgery following recommended IV abx and may do RLE AKA later in hospital course when medically stable  -C/w with vancomycin   - f/u LE dopplers r/o DVT  - C/w with wound care     #UTI   -UA+  -C/w with CTX 2mg qd

## 2020-10-14 NOTE — PROGRESS NOTE ADULT - ASSESSMENT
39M Undomiciled [facility] Heroin abuse (on methadone) with spinal cord injury c/b b/l BKA 8-10 years ago ([facility]) presenting with 2 months of nausea, vomiting, and diarrhea, as well as 2 weeks of worsening fatigue, is found to be uremic and hyperkalemic. Now on hemodialysis and concurrently being managed for LLE cellulitis and RLE chronic limb ischemia.

## 2020-10-14 NOTE — ADVANCED PRACTICE NURSE CONSULT - REASON FOR CONSULT
WOC nurse consult to assess left lower extremity ulcer. Bilateral lower extremities evaluated by the vascular team. WOCN consult not indicated at this time. WOCN informed house staff Dr Medina of the same.

## 2020-10-14 NOTE — DIETITIAN INITIAL EVALUATION ADULT. - PROBLEM SELECTOR PLAN 8
Presented with troponemia in setting of ARF with no ischemic changes on EKG and elevated proBNP of 7428. Unlikely ACS, probably in setting of JEAN PIERRE.  - Trend Trop to peak  - F/u Echocardiogram

## 2020-10-14 NOTE — PROGRESS NOTE ADULT - PROBLEM SELECTOR PLAN 6
Recent incarceration, endorses cocaine, heroin (5 days ago) and K2 use. Now on methadone 90mg qd  - confirmed methadone dosing 90mg qd - [phone] [facility] [address], [place] - [id number]  -C/w with methadone 90mg qd  -  Utox positive for opiods, cocaine, benzos and methadone   - HIV neg, positive Hep C antibody   - F/u TB quant   - Monitor for signs of withdrawal. Recent incarceration, endorses cocaine, heroin (5 days ago) and K2 use. Now on methadone 90mg qd  - confirmed methadone dosing 90mg qd - [phone] [facility] [address], [place] - [id number]  -C/w with methadone 90mg qd  -  Utox positive for opiods, cocaine, benzos and methadone   - Avoid beta blockers  - HIV neg, positive Hep C antibody   - F/u TB quant   - Monitor for signs of withdrawal.

## 2020-10-14 NOTE — PROGRESS NOTE ADULT - PROBLEM SELECTOR PLAN 6
Recent incarceration, endorses cocaine, heroin (5 days ago) and K2 use. Now on methadone 90mg qd. Utox on admission positive for opiods, cocaine, benzos and methadone   - confirmed methadone dosing 90mg qd - [phone] [facility] [address], [place] - [phone]  -C/w methadone 90mg qd  - Avoid beta blockers  - HIV neg, positive Hep C antibody   - F/u TB quant   - Monitor for signs of withdrawal Recent incarceration, endorses cocaine, heroin (5 days ago) and K2 use. Now on methadone 90mg qd. Utox on admission positive for opiods, cocaine, benzos and methadone   - confirmed methadone dosing 90mg qd - [phone] [facility] [address], [place] - [id number]  -C/w methadone 90mg qd  - Avoid beta blockers  - HIV neg, positive Hep C antibody   - F/u TB quant   - Monitor for signs of withdrawal, on CIWA protocol for now  - clarify if hx of specifically IV drug use after pt returns from HD

## 2020-10-14 NOTE — PROGRESS NOTE ADULT - PROBLEM SELECTOR PLAN 4
Presenting with LLE cellulitis 2/2 ulceration. s/p bilateral BKA RLE w/ probable chronic limb ischemia and LLE concerning for cellulitis  - s/p vancomycin and zosyn in the ED   - Vascular surgery following recommended IV abx and may do RLE AKA later in hospital course when medically stable  - LE dopplers r/o DVT showed no DVT   - C/w with wound care   - C/w with CTX 2mg     #UTI   -UA+  -C/w with CTX 2mg qd

## 2020-10-14 NOTE — PROGRESS NOTE ADULT - PROBLEM SELECTOR PLAN 1
Presented with decreased urine output over last few days to weeks with elevated BUN/ Cr 192/9.9,  unknown baseline.   -S/p 2L of NS in ED  -renal ultrasound showed Mild right calyectasis without dilatation of the renal pelvis and medical renal disease.   -urine lytes showed post obstructive JEAN PIERRE  -Strict I&O  -Trend Cr   - Nephro following, appre recs  -Right subclavian HD cath by IR and HD later today Presented with decreased urine output over last few days to weeks with elevated BUN/ Cr 192/9.9,  unknown baseline.   -S/p 2L of NS in ED  -renal ultrasound showed Mild right calyectasis without dilatation of the renal pelvis and medical renal disease.   -urine lytes showed post obstructive JEAN PIERRE  -Strict I&O  -Trend Cr   - Nephro following, appre recs  -S/p hemodialysis yesterday, f/u renal recs moving forward regarding future dialysis sessions.

## 2020-10-14 NOTE — PROGRESS NOTE ADULT - PROBLEM SELECTOR PLAN 9
Presented with troponemia in setting of ARF with no ischemic changes on EKG and elevated proBNP of 7428. Unlikely ACS, probably in setting of JEAN PIERRE.  -Trend Trop to peak  -TTE normal with EF 64% Presented with troponemia in setting of ARF with no ischemic changes on EKG and elevated proBNP of 7428. Unlikely ACS, probably in setting of JEAN PIERRE.  -Trended Trop to peak  -TTE normal with EF 64%

## 2020-10-14 NOTE — PROGRESS NOTE ADULT - SUBJECTIVE AND OBJECTIVE BOX
Patient is a 39y Male seen and evaluated at bedside during HD. S/p first session yesterday. Severe hyperphos and hyperuric acideaemia improved post HD. Aetiology unclear. W/u for malignancy ensuing. Admits to weight loss over several months. Emotional about wanting to "turn his life around." Grateful for health care.    Meds:    cefTRIAXone   IVPB 2000 every 24 hours  heparin   Injectable 5000 every 12 hours  iohexol 300 mG (iodine)/mL Oral Solution 30 once  LORazepam   Injectable 2 every 2 hours PRN  methadone    Tablet 90 daily  ondansetron Injectable 4 every 6 hours PRN  sodium chloride 0.9% Bolus 1000 once      T(C): , Max: 37 (10-14-20 @ 09:32)  T(F): , Max: 98.6 (10-14-20 @ 09:32)  HR: 96 (10-14-20 @ 14:25)  BP: 154/88 (10-14-20 @ 14:25)  BP(mean): 83 (10-13-20 @ 21:29)  RR: 18 (10-14-20 @ 14:25)  SpO2: 96% (10-14-20 @ 14:25)  Wt(kg): --    10-13 @ 07:01  -  10-14 @ 07:00  --------------------------------------------------------  IN: 1700 mL / OUT: 650 mL / NET: 1050 mL    Review of Systems: +nausea- improved, no SOB, CP    PHYSICAL EXAM:  GENERAL: Alert, oriented; emaciated, reduced skin turgour  CHEST/LUNG: Clear to auscultation bilaterally  HEART: normal S1S2, RRR  ABDOMEN: Soft, Nontender, non distended  EXTREMITIES: B/l amputations, no oedema  ACCESS: [facility] cath    LABS:                        8.8    6.61  )-----------( 368      ( 14 Oct 2020 07:32 )             29.7     10-14    139  |  92<L>  |  88<H>  ----------------------------<  65<L>  4.6   |  25  |  6.13<H>    Ca    7.8<L>      14 Oct 2020 07:31  Phos  9.0     10-14  Mg     2.2     10-14    TPro  7.2  /  Alb  1.7<L>  /  TBili  0.4  /  DBili  x   /  AST  21  /  ALT  11  /  AlkPhos  598<H>  10-14    Uric Acid, Serum: 12.3 mg/dL <H> [3.4 - 8.8] (10-14 @ 07:31)  C3 Complement, Serum: 91 mg/dL [81 - 157] (10-14 @ 01:28)      Urinalysis Basic - ( 12 Oct 2020 18:12 )    Color: Yellow / Appearance: Clear / S.020 / pH: x  Gluc: x / Ketone: Trace mg/dL  / Bili: Small / Urobili: 0.2 E.U./dL   Blood: x / Protein: >=300 mg/dL / Nitrite: NEGATIVE   Leuk Esterase: Trace / RBC: > 10 /HPF / WBC Many /HPF   Sq Epi: x / Non Sq Epi: 0-5 /HPF / Bacteria: Many /HPF      Creatinine, Random Urine: 36 mg/dL (10-12 @ 18:12)  Sodium, Random Urine: 46 mmol/L (10-12 @ 18:12)  Chloride, Random Urine: <20 mmol/L (10-12 @ 18:12)  Osmolality, Random Urine: 332 mosm/kg (10-12 @ 18:12)        RADIOLOGY & ADDITIONAL STUDIES:

## 2020-10-14 NOTE — DIETITIAN INITIAL EVALUATION ADULT. - OTHER INFO
39M Undomiciled PMH Heroin abuse (on methadone) with spinal cord injury c/b b/l BKA 8-10 years ago ([place]) now presents with worsening fatigue for the last 2 weeks, in the setting of 2 months of nausea, vomiting and diarrhea, admitted with JEAN PIERRE for HD. Pt seen in room, agitated during visit. Pt reports poor PO intake 2/2 unable to keep anything down for "a long time." Pt reports continued N/V today 10/14, on zofran. Pt report 60lb wt loss 2/2 poor PO intake, ABW 100lbs, would reflect 60lb/37% wt loss in unspecified time frame. NFPE reflects moderate muscle and fat loss in buccal and triceps. Pt meets criteria for severe PCM 2/2 reported wt loss, muscle and fat loss, likely meeting <75% EER >1 month. Pt currently on renal diet, note phos 9.0H. RD attempted to provide diet education however pt not receptive. Pt did ask RD to help him order lunch, RD ordered pt lunch as well as provided instructions on number to call and how to order future meals. Note adjusted BMI 17.1. Per flowsheet, last BM 10/14. Skin with BLE cellulitis. 1+generalized edema. Please see recs below. Will continue to follow per RD protocol.

## 2020-10-14 NOTE — PROGRESS NOTE ADULT - ASSESSMENT
39M Undomiciled PMH Heroin abuse (on methadone) with spinal cord injury c/b b/l BKA 8-10 years ago ([facility]) now presents with worsening fatigue for the last 2 weeks, in the setting of 2 months of nausea, vomiting and diarrhea, admitted to [facility]  with JEAN PIERRE for HD.

## 2020-10-14 NOTE — PROGRESS NOTE ADULT - PROBLEM SELECTOR PLAN 5
Patient Hep antibody +, hep B and HIV neg  -Elevated Alkphos, AST/ALT wnl   -F/u GGT      #Diarrhea  -Patient with 2 months of diarrhea, denies antibiotic use  -F/u Stool PCR and C-Diff  -Consult GI, appre recs Patient Hep antibody +, hep B and HIV neg  -Elevated Alkphos, AST/ALT wnl   -F/u GGT      #Diarrhea  -Patient with 2 months of diarrhea, denies antibiotic use  -F/u Stool PCR and C-Diff  -Consult GI, appreciate recs

## 2020-10-14 NOTE — PROGRESS NOTE ADULT - ATTENDING COMMENTS
pt seen o/n after transfer to [facility] on 10/14. asleep, awoken,  noncooperative w/ most of exam, decreased breath sounds b/l (poor effort); Right sided HD cath in place; s/p b/l BKA.     1. JEAN PIERRE: started on HD 2/2 uremia. monitor lytes, renal function, followup renal recs.   2. followup vascular recs, on ceftriaxone for cellulitis and UTI   3. monitor for w/d sxs

## 2020-10-14 NOTE — PROGRESS NOTE ADULT - PROBLEM SELECTOR PLAN 9
F: None   E: Replete electrolytes as needed, K>4, M>2  N: Renal Diet; additionally will obtain nutrition consult for malnutrition and cachetic appearance  A: Heparin Subq     FULL CODE  Dispo: SHEREE

## 2020-10-14 NOTE — CHART NOTE - TREATMENT: THE FOLLOWING DIET HAS BEEN RECOMMENDED
Diet, Renal Restrictions:   For patients receiving Renal Replacement - No Protein Restr, No Conc K, No Conc Phos, Low Sodium (10-12-20 @ 17:55) [Active]    1. Recommend renal diet +Nepro BID (each 425kcal/19gpro)   2. Continue antiemetic   3. Trend wts pre/post HD   4. Monitor lytes and replete   5. Add MVI

## 2020-10-14 NOTE — DIETITIAN INITIAL EVALUATION ADULT. - PROBLEM SELECTOR PLAN 6
Likely 2/2 ACD in setting of renal dysfunction. No signs of acute blood loss.   - f/u iron studies B12 and Folate  - Transfuse Hg<7  - Maintain active Type and Screen

## 2020-10-14 NOTE — PROGRESS NOTE ADULT - PROBLEM SELECTOR PLAN 2
Hyperkalemia to 5.4 on admission with EKG showing peaked T waves isolate in V4, unknown baseline  -S/p EVELIN Matute this AM 4.6  -Trend BMP  -rest of management as per above with nephrology

## 2020-10-14 NOTE — DIETITIAN INITIAL EVALUATION ADULT. - MALNUTRITION
Pt meets criteria for severe PCM 2/2 reported wt loss, muscle and fat loss, likely meeting <75% EER >1 month. Suspect severe PCM

## 2020-10-14 NOTE — DIETITIAN INITIAL EVALUATION ADULT. - ENERGY NEEDS
Height: 66" Weight: 100lbs, Adjusted IBW 2/2 BKA (5.9%) 134lbs+/-10%, %IBW 75%, Adjusted BMI 17.1,  ABW used for calculations as pt between <75% adjusted IBW, adjusted for malnutrition, fluids per team 2/2 HD
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
## 2020-10-14 NOTE — PROGRESS NOTE ADULT - ASSESSMENT
39y Male PMHx heroin abuse (on methadone) with spinal cord injury c/b b/l BKA 8-10 years ago. RLE w/ probable chronic limb ischemia and LLE w/ cellulitis 2/2 ulceration.    Recommendations:  -obtain wound care consult  -No vascular intervention at this time  -signing off thank you for the consult

## 2020-10-14 NOTE — PROGRESS NOTE ADULT - PROBLEM SELECTOR PLAN 8
Ca 7.3 on presentation with Alb 2.2 - adjusted calcium for alb 8.7  - , Vitamin D 1,25 11.2   -Nephro following, appre recs

## 2020-10-14 NOTE — DIETITIAN INITIAL EVALUATION ADULT. - PROBLEM SELECTOR PLAN 5
Recent incarceration, endorses cocaine, heroin (5 days ago) and K2 use. Now on methadone 90mg qd  - confirmed methadone dosing 90mg qd - [phone] [facility] [address], [place] - [id number]  -C/w with methadone 90mg qd  - F/u Utox  - F/u HIV and hepatitis panel   - F/u TB quant   - Monitor for signs of withdrawal.

## 2020-10-14 NOTE — PROGRESS NOTE ADULT - PROBLEM SELECTOR PLAN 1
Presented with decreased urine output over last few days to weeks with elevated  and Cr 9.9,  unknown baseline. N/V of several weeks without associated abdominal pain most likely 2/2 uremia. Per nephrology, presentation concerning for possible tumor lysis syndrome  -S/p 2L of NS in ED and initial HD session on 10/13  -renal ultrasound showed Mild right calyectasis without dilatation of the renal pelvis and medical renal disease.   -urine lytes revealing post-obstructive JEAN PIERRE  - Nephro following, concern for possible tumor lysis syndrome (2/2 necrotic liver tumors?) that caused elev uric acid, hyperkalemia, and hyperphosphatemia on admission  - received STAT dose Rasburicase 6mg this AM per nephrology for elevated uric acid 12.3  - plan for another HD session today  -Trend Cr and daily uric acid levels  - continue Zofran 4mg IVP PRN nausea/vomiting (QTc 468), reassess QTc on repeat EKG this AM

## 2020-10-14 NOTE — DIETITIAN INITIAL EVALUATION ADULT. - PROBLEM SELECTOR PLAN 1
Presented with decreased urine output over last few days to weeks with elevated BUN/ Cr 192/9.9,  unknown baseline.   -S/p 2L of NS in ED  -C/w with IVF   -F/u renal ultrasound  -F/u urine lytes   -Strict I&O  -Trend Cr   -Renal consulted, follow recs

## 2020-10-14 NOTE — DIETITIAN INITIAL EVALUATION ADULT. - PROBLEM SELECTOR PLAN 9
F: C/w IVF   E: Replete electrolytes as needed, K>4, M>2  N: Renal Diet   P: Heparin Subq   D: 7Lach

## 2020-10-14 NOTE — PROGRESS NOTE ADULT - SUBJECTIVE AND OBJECTIVE BOX
Hospital Course:    Patient is a 39M Undomiciled PMH Heroin abuse (on methadone) with spinal cord injury c/b b/l BKA 8-10 years ago ([place]) presents with worsening fatigue for the last 2 weeks, in the setting of 2 months of nausea, vomiting and diarrhea. Denies fever, chest pain, changes in mental status, cough, hemoptysis, weight loss, night sweats. Of note patient reports that he was recently incarcerated for 6 months and was released in July of this year, he reports that he had blood work done and there was no mention of renal dysfunction. He does not follow up with a primary care physician and does not take any medication other than methadone [phone]. He last used heroin 5 days ago but is making a concerted effort to stop using. In the ED VS T 98 HR 99 /76 R16 96% on RA  Labs: WBC 7 | Hg/Hct 11/35 MCV 77 | Plt 577 | Na 140 K 5.4 | Co2 31 AG 33 BUN/ Cr 192/9.92 | TP/ Alb 8.8/ 2/2 | AST/ALT 22/10  | EKG: NSR Qtc 468 peaked T waves in V3-V4 unknown baseline, LVH, CXR - no focal consolidations. Pt was seen by Vacular Surgery for probable chronic limb ischemia on RLE and cellulitis on LLE. He was started on Vanc and zosyn     In 7Lach: D/c Vanc and zosyn, started on CTX for both cellulites and UTI. Pt had HD port placed by IR, had HD session.     OVERNIGHT EVENTS:  Renal recommended urgent dialysis. Pt uncooperative with HD catheter placement. HD not done. Schedule with IR today     SUBJECTIVE / INTERVAL HPI: Patient seen and examined at bedside. Patient upset this morning, reports that he is done and wants this to end.  Reports to N/V/D, denies chest pain, sob, palpitations.       VITAL SIGNS:  Vital Signs Last 24 Hrs  T(C): 36.8 (13 Oct 2020 06:06), Max: 36.9 (13 Oct 2020 02:00)  T(F): 98.3 (13 Oct 2020 06:06), Max: 98.5 (13 Oct 2020 02:00)  HR: 97 (13 Oct 2020 06:12) (80 - 99)  BP: 99/60 (13 Oct 2020 06:12) (95/53 - 114/76)  BP(mean): 73 (13 Oct 2020 06:12) (69 - 85)  RR: 16 (13 Oct 2020 06:12) (16 - 18)  SpO2: 92% (13 Oct 2020 06:12) (91% - 96%)    PHYSICAL EXAM:    General: WDWN  HEENT: NC/AT; PERRL, anicteric sclera; MMM  Neck: supple  Cardiovascular: +S1/S2; RRR  Respiratory: CTA B/L; no W/R/R  Gastrointestinal: soft, NT/ND; +BSx4  Extremities: WWP; no edema, clubbing or cyanosis, bilateral BKA with wound dressing,    Vascular: 2+ radial, DP/PT pulses B/L  Neurological: AAOx3; no focal deficits    MEDICATIONS:  MEDICATIONS  (STANDING):  cefTRIAXone   IVPB 2000 milliGRAM(s) IV Intermittent every 24 hours  heparin   Injectable 5000 Unit(s) SubCutaneous every 12 hours  methadone    Tablet 90 milliGRAM(s) Oral daily    MEDICATIONS  (PRN):  ondansetron Injectable 4 milliGRAM(s) IV Push every 6 hours PRN Nausea and/or Vomiting      ALLERGIES:  Allergies    No Known Allergies    Intolerances        LABS:                        9.5    6.40  )-----------( 490      ( 13 Oct 2020 05:35 )             30.0     10-13    140  |  81<L>  |  187<H>  ----------------------------<  58<L>  5.8<H>   |  25  |  9.86<H>    Ca    6.6<L>      13 Oct 2020 05:35  Phos  16.8     10-13  Mg     2.7     10-13    TPro  7.7  /  Alb  1.9<L>  /  TBili  0.5  /  DBili  x   /  AST  21  /  ALT  12  /  AlkPhos  614<H>  10-13      Urinalysis Basic - ( 12 Oct 2020 18:12 )    Color: Yellow / Appearance: Clear / S.020 / pH: x  Gluc: x / Ketone: Trace mg/dL  / Bili: Small / Urobili: 0.2 E.U./dL   Blood: x / Protein: >=300 mg/dL / Nitrite: NEGATIVE   Leuk Esterase: Trace / RBC: > 10 /HPF / WBC Many /HPF   Sq Epi: x / Non Sq Epi: 0-5 /HPF / Bacteria: Many /HPF      CAPILLARY BLOOD GLUCOSE          RADIOLOGY & ADDITIONAL TESTS: Reviewed.    ASSESSMENT:    PLAN: Hospital Course:    Patient is a 39M Undomiciled PMH Heroin abuse (on methadone) with spinal cord injury c/b b/l BKA 8-10 years ago ([place]) presents with worsening fatigue for the last 2 weeks, in the setting of 2 months of nausea, vomiting and diarrhea. Denies fever, chest pain, changes in mental status, cough, hemoptysis, weight loss, night sweats. Of note patient reports that he was recently incarcerated for 6 months and was released in July of this year, he reports that he had blood work done and there was no mention of renal dysfunction. He does not follow up with a primary care physician and does not take any medication other than methadone [phone] He last used heroin 5 days ago but is making a concerted effort to stop using. In the ED VS T 98 HR 99 /76 R16 96% on RA  Labs: WBC 7 | Hg/Hct 11/35 MCV 77 | Plt 577 | Na 140 K 5.4 | Co2 31 AG 33 BUN/ Cr 192/9.92 | TP/ Alb 8.8/ 2/2 | AST/ALT 22/10  | EKG: NSR Qtc 468 peaked T waves in V3-V4 unknown baseline, LVH, CXR - no focal consolidations. Pt was seen by Vacular Surgery for probable chronic limb ischemia on RLE and cellulitis on LLE. He was started on Vanc and zosyn. Duplex scan showing no DVT.    In 7Lach: D/c'd zosyn, started on CTX for both cellulites and UTI. Pt had HD port placed by IR, had HD session.     OVERNIGHT EVENTS:  Renal recommended urgent dialysis. Pt uncooperative with HD catheter placement. HD not done. Schedule with IR today     SUBJECTIVE / INTERVAL HPI: Patient seen and examined at bedside. Patient upset this morning, reports that he is done and wants this to end.  Reports to N/V/D, denies chest pain, sob, palpitations.       VITAL SIGNS:  Vital Signs Last 24 Hrs  T(C): 36.8 (13 Oct 2020 06:06), Max: 36.9 (13 Oct 2020 02:00)  T(F): 98.3 (13 Oct 2020 06:06), Max: 98.5 (13 Oct 2020 02:00)  HR: 97 (13 Oct 2020 06:12) (80 - 99)  BP: 99/60 (13 Oct 2020 06:12) (95/53 - 114/76)  BP(mean): 73 (13 Oct 2020 06:12) (69 - 85)  RR: 16 (13 Oct 2020 06:12) (16 - 18)  SpO2: 92% (13 Oct 2020 06:12) (91% - 96%)    PHYSICAL EXAM:    General: Cachectic appearing man, curled up in bed, responsive but limited cooperativeness with exam   HEENT: NC/AT; PERRL, anicteric sclera; MMM  Cardiovascular: +S1/S2; RRR  Respiratory: Very poor inspiratory effort by patient, but from what was heard, appeared CTA B/L; no W/R/R  Gastrointestinal: soft, NT/ND; +BSx4  Extremities: WWP; no edema, clubbing or cyanosis, bilateral BKA with wound dressing,    Vascular: 2+ radial  Skin: R hemacath noted on Right upper chest  MSK: Back with lumbar curvature with patch on back, nonerthematous and nontender skin around dressing patch, no pain on spinal / paraspinal palpation  Neurological: AAOX1 as pt just would say his name, wouldn't answer where he was or what was the date (he said "why are you asking me that, you know i'm here"),     MEDICATIONS:  MEDICATIONS  (STANDING):  cefTRIAXone   IVPB 2000 milliGRAM(s) IV Intermittent every 24 hours  heparin   Injectable 5000 Unit(s) SubCutaneous every 12 hours  methadone    Tablet 90 milliGRAM(s) Oral daily    MEDICATIONS  (PRN):  ondansetron Injectable 4 milliGRAM(s) IV Push every 6 hours PRN Nausea and/or Vomiting    ALLERGIES:  Allergies    No Known Allergies    Intolerances    LABS:                        9.5    6.40  )-----------( 490      ( 13 Oct 2020 05:35 )             30.0     10-13    140  |  81<L>  |  187<H>  ----------------------------<  58<L>  5.8<H>   |  25  |  9.86<H>    Ca    6.6<L>      13 Oct 2020 05:35  Phos  16.8     10-13  Mg     2.7     10-13    TPro  7.7  /  Alb  1.9<L>  /  TBili  0.5  /  DBili  x   /  AST  21  /  ALT  12  /  AlkPhos  614<H>  10-13    Urinalysis Basic - ( 12 Oct 2020 18:12 )    Color: Yellow / Appearance: Clear / S.020 / pH: x  Gluc: x / Ketone: Trace mg/dL  / Bili: Small / Urobili: 0.2 E.U./dL   Blood: x / Protein: >=300 mg/dL / Nitrite: NEGATIVE   Leuk Esterase: Trace / RBC: > 10 /HPF / WBC Many /HPF   Sq Epi: x / Non Sq Epi: 0-5 /HPF / Bacteria: Many /HPF    CAPILLARY BLOOD GLUCOSE    RADIOLOGY & ADDITIONAL TESTS: Reviewed.     Hospital Course:    Patient is a 39M Undomiciled PMH Heroin abuse (on methadone) with spinal cord injury c/b b/l BKA 8-10 years ago ([facility]) presents with worsening fatigue for the last 2 weeks, in the setting of 2 months of nausea, vomiting and diarrhea. Denies fever, chest pain, changes in mental status, cough, hemoptysis, weight loss, night sweats. Of note patient reports that he was recently incarcerated for 6 months and was released in July of this year, he reports that he had blood work done and there was no mention of renal dysfunction. He does not follow up with a primary care physician and does not take any medication other than methadone [phone]. He last used heroin 5 days ago but is making a concerted effort to stop using. In the ED VS T 98 HR 99 /76 R16 96% on RA  Labs: WBC 7 | Hg/Hct 11/35 MCV 77 | Plt 577 | Na 140 K 5.4 | Co2 31 AG 33 BUN/ Cr 192/9.92 | TP/ Alb 8.8/ 2/2 | AST/ALT 22/10  | EKG: NSR Qtc 468 peaked T waves in V3-V4 unknown baseline, LVH, CXR - no focal consolidations. Pt was seen by Vacular Surgery for probable chronic limb ischemia on RLE and cellulitis on LLE. He was started on Vanc and zosyn. Duplex scan showing no DVT.    In 7Lach: D/c'd zosyn, started on CTX for both cellulites and UTI. Pt had HD port placed by IR, had HD session.     OVERNIGHT EVENTS: Patient stepped down overnight from 7L to 4U    SUBJECTIVE / INTERVAL HPI: Patient seen and examined at bedside. Met patient Overnight following transfer. Pt did endorse some pain in his back which he said he has for weeks, wouldn't specify further when asked. Denied F/c,SOB/CP,palpitations/Abd pain,n/v/changes in bowel/bladder habits.     VITAL SIGNS:  Vital Signs Last 24 Hrs  T(C): 36.8 (13 Oct 2020 06:06), Max: 36.9 (13 Oct 2020 02:00)  T(F): 98.3 (13 Oct 2020 06:06), Max: 98.5 (13 Oct 2020 02:00)  HR: 97 (13 Oct 2020 06:12) (80 - 99)  BP: 99/60 (13 Oct 2020 06:12) (95/53 - 114/76)  BP(mean): 73 (13 Oct 2020 06:12) (69 - 85)  RR: 16 (13 Oct 2020 06:12) (16 - 18)  SpO2: 92% (13 Oct 2020 06:12) (91% - 96%)    PHYSICAL EXAM:  General: Cachectic appearing man, curled up in bed, responsive but limited cooperativeness with exam   HEENT: NC/AT; PERRL, anicteric sclera; MMM  Cardiovascular: +S1/S2; RRR  Respiratory: Very poor inspiratory effort by patient, but from what was heard, appeared CTA B/L; no W/R/R  Gastrointestinal: soft, NT/ND; +BSx4  Extremities: WWP; no edema, clubbing or cyanosis, bilateral BKA with wound dressing,    Vascular: 2+ radial  Skin: R hemacath noted on Right upper chest  MSK: Back with lumbar curvature with patch on back, nonerthematous and nontender skin around dressing patch, no pain on spinal / paraspinal palpation  Neurological: AAOX1 as pt just would say his name, wouldn't answer where he was or what was the date (he said "why are you asking me that, you know i'm here"),     MEDICATIONS:  MEDICATIONS  (STANDING):  cefTRIAXone   IVPB 2000 milliGRAM(s) IV Intermittent every 24 hours  heparin   Injectable 5000 Unit(s) SubCutaneous every 12 hours  methadone    Tablet 90 milliGRAM(s) Oral daily    MEDICATIONS  (PRN):  ondansetron Injectable 4 milliGRAM(s) IV Push every 6 hours PRN Nausea and/or Vomiting    ALLERGIES:  Allergies    No Known Allergies    Intolerances    LABS:                        9.5    6.40  )-----------( 490      ( 13 Oct 2020 05:35 )             30.0     10-13    140  |  81<L>  |  187<H>  ----------------------------<  58<L>  5.8<H>   |  25  |  9.86<H>    Ca    6.6<L>      13 Oct 2020 05:35  Phos  16.8     10-13  Mg     2.7     10-13    TPro  7.7  /  Alb  1.9<L>  /  TBili  0.5  /  DBili  x   /  AST  21  /  ALT  12  /  AlkPhos  614<H>  10-13    Urinalysis Basic - ( 12 Oct 2020 18:12 )    Color: Yellow / Appearance: Clear / S.020 / pH: x  Gluc: x / Ketone: Trace mg/dL  / Bili: Small / Urobili: 0.2 E.U./dL   Blood: x / Protein: >=300 mg/dL / Nitrite: NEGATIVE   Leuk Esterase: Trace / RBC: > 10 /HPF / WBC Many /HPF   Sq Epi: x / Non Sq Epi: 0-5 /HPF / Bacteria: Many /HPF    CAPILLARY BLOOD GLUCOSE    RADIOLOGY & ADDITIONAL TESTS: Reviewed.

## 2020-10-14 NOTE — PROGRESS NOTE ADULT - PROBLEM SELECTOR PLAN 2
Presenting with several weeks of nausea and vomiting, no abdominal pain. Likely secondary to uremia.   -QTc 468  -C/w with PRN zofran Presenting with several weeks of nausea and vomiting, no abdominal pain. Likely secondary to uremia.   -QTc 468  -C/w with PRN zofran  -Repeat EKG this am to reassess QTC

## 2020-10-14 NOTE — PROGRESS NOTE ADULT - SUBJECTIVE AND OBJECTIVE BOX
OVERNIGHT EVENTS:    SUBJECTIVE / INTERVAL HPI: Patient seen and examined at bedside.     VITAL SIGNS:  Vital Signs Last 24 Hrs  T(C): 37 (14 Oct 2020 09:32), Max: 37 (14 Oct 2020 09:32)  T(F): 98.6 (14 Oct 2020 09:32), Max: 98.6 (14 Oct 2020 09:32)  HR: 93 (14 Oct 2020 09:32) (82 - 96)  BP: 123/73 (14 Oct 2020 09:32) (99/61 - 123/73)  BP(mean): 83 (13 Oct 2020 21:29) (72 - 83)  RR: 18 (14 Oct 2020 09:32) (17 - 18)  SpO2: 94% (14 Oct 2020 09:32) (94% - 99%)    PHYSICAL EXAM:    General: Well developed, well nourished, no acute distress  HEENT: NC/AT; PERRL, anicteric sclera; MMM  Neck: supple, no JVD  Cardiovascular: +S1/S2, RRR; no murmurs, rubs, gallops  Respiratory: CTAB; no wheezes, rales, or rhonchi  Gastrointestinal: soft, NT/ND, no massess palpated; no rebound tenderness or guarding; +BS  Extremities: warm and well-perfused; no edema, clubbing or cyanosis  Vascular: 2+ radial, DP pulses B/L  Neurological: AAOx3; no focal deficits; PATRICK    MEDICATIONS:  MEDICATIONS  (STANDING):  cefTRIAXone   IVPB 2000 milliGRAM(s) IV Intermittent every 24 hours  heparin   Injectable 5000 Unit(s) SubCutaneous every 12 hours  methadone    Tablet 90 milliGRAM(s) Oral daily  rasburicase IVPB 6 milliGRAM(s) IV Intermittent once  sodium chloride 0.9% Bolus 1000 milliLiter(s) IV Bolus once    MEDICATIONS  (PRN):  ondansetron Injectable 4 milliGRAM(s) IV Push every 6 hours PRN Nausea and/or Vomiting      ALLERGIES:  Allergies    No Known Allergies    Intolerances        LABS:                        8.8    6.61  )-----------( 368      ( 14 Oct 2020 07:32 )             29.7     10-14    139  |  92<L>  |  88<H>  ----------------------------<  65<L>  4.6   |  25  |  6.13<H>    Ca    7.8<L>      14 Oct 2020 07:31  Phos  9.0     10-14  Mg     2.2     10-14    TPro  7.2  /  Alb  1.7<L>  /  TBili  0.4  /  DBili  x   /  AST  21  /  ALT  11  /  AlkPhos  598<H>  10-14      Urinalysis Basic - ( 12 Oct 2020 18:12 )    Color: Yellow / Appearance: Clear / S.020 / pH: x  Gluc: x / Ketone: Trace mg/dL  / Bili: Small / Urobili: 0.2 E.U./dL   Blood: x / Protein: >=300 mg/dL / Nitrite: NEGATIVE   Leuk Esterase: Trace / RBC: > 10 /HPF / WBC Many /HPF   Sq Epi: x / Non Sq Epi: 0-5 /HPF / Bacteria: Many /HPF      CAPILLARY BLOOD GLUCOSE          RADIOLOGY & ADDITIONAL TESTS: Reviewed.    PLAN: OVERNIGHT EVENTS: Transferred from     SUBJECTIVE / INTERVAL HPI: Patient seen and examined at bedside.   Patient significantly agitated this morning and threw cup of water at PCA. Was upset that no one immediately came to help him when he had a BM in bed this morning. Refused to participate in interview as he was very upset with his care in the unit so far. Reported having pain "all over" his body. Demanded to have breakfast and stated he would not participate in any further encounters until he received breakfast.    VITAL SIGNS:  Vital Signs Last 24 Hrs  T(C): 37 (14 Oct 2020 09:32), Max: 37 (14 Oct 2020 09:32)  T(F): 98.6 (14 Oct 2020 09:32), Max: 98.6 (14 Oct 2020 09:32)  HR: 93 (14 Oct 2020 09:32) (82 - 96)  BP: 123/73 (14 Oct 2020 09:32) (99/61 - 123/73)  BP(mean): 83 (13 Oct 2020 21:29) (72 - 83)  RR: 18 (14 Oct 2020 09:32) (17 - 18)  SpO2: 94% (14 Oct 2020 09:32) (94% - 99%)    PHYSICAL EXAM:  Patient refused exam.    General: Cachetic male, sitting up in bed, agitated.  HEENT: Temporal wasting present, mucous membranes dry.  Extremities: L residual limb with dressing wrapped over but serosanguinous bleed through dressing noted.      MEDICATIONS:  MEDICATIONS  (STANDING):  cefTRIAXone   IVPB 2000 milliGRAM(s) IV Intermittent every 24 hours  heparin   Injectable 5000 Unit(s) SubCutaneous every 12 hours  methadone    Tablet 90 milliGRAM(s) Oral daily  rasburicase IVPB 6 milliGRAM(s) IV Intermittent once  sodium chloride 0.9% Bolus 1000 milliLiter(s) IV Bolus once    MEDICATIONS  (PRN):  ondansetron Injectable 4 milliGRAM(s) IV Push every 6 hours PRN Nausea and/or Vomiting      ALLERGIES:  Allergies    No Known Allergies    Intolerances        LABS:                        8.8    6.61  )-----------( 368      ( 14 Oct 2020 07:32 )             29.7     10-14    139  |  92<L>  |  88<H>  ----------------------------<  65<L>  4.6   |  25  |  6.13<H>    Ca    7.8<L>      14 Oct 2020 07:31  Phos  9.0     10-  Mg     2.2     10-14    TPro  7.2  /  Alb  1.7<L>  /  TBili  0.4  /  DBili  x   /  AST  21  /  ALT  11  /  AlkPhos  598<H>  10-14      Urinalysis Basic - ( 12 Oct 2020 18:12 )    Color: Yellow / Appearance: Clear / S.020 / pH: x  Gluc: x / Ketone: Trace mg/dL  / Bili: Small / Urobili: 0.2 E.U./dL   Blood: x / Protein: >=300 mg/dL / Nitrite: NEGATIVE   Leuk Esterase: Trace / RBC: > 10 /HPF / WBC Many /HPF   Sq Epi: x / Non Sq Epi: 0-5 /HPF / Bacteria: Many /HPF          RADIOLOGY & ADDITIONAL TESTS: Reviewed.

## 2020-10-14 NOTE — PROGRESS NOTE ADULT - PROBLEM SELECTOR PLAN 3
Presents with AGMA in setting of uremia and underlying metabolic alkalosis with respiratory compensation in the setting of chronic nausea and vomiting.     #Hyperkalemia   Hyperkalemia 5.4 on admission with EKG showing peaked T waves isolate in V4, unknown baseline.  -S/p Lokelma   -Trend BMP  -Follow up repeat BMP after HD Presents with AGMA in setting of uremia and underlying metabolic alkalosis with respiratory compensation in the setting of chronic nausea and vomiting.     #Hyperkalemia   Hyperkalemia 5.4 on admission with EKG showing peaked T waves isolate in V4, unknown baseline.  -S/p Lokelma   -Trend BMP  -Follow up repeat BMP after HD showed K in 4s

## 2020-10-14 NOTE — PROGRESS NOTE ADULT - PROBLEM SELECTOR PLAN 10
F: None   E: Replete electrolytes as needed, K>4, M>2  N: Renal Diet   P: Heparin Subq   D: 7Lach F: None   E: Replete electrolytes as needed, K>4, M>2  N: Renal Diet; additionally will obtain nutrition consult for malnutrition and cachetic appearance  P: Heparin Subq   D: 7Lach F: None   E: Replete electrolytes as needed, K>4, M>2  N: Renal Diet; additionally will obtain nutrition consult for malnutrition and cachetic appearance  P: Heparin Subq   D: RMF

## 2020-10-14 NOTE — PROGRESS NOTE ADULT - ATTENDING COMMENTS
patient with severely elevated uric acid and JEAN PIERRE concerning for electrolytes concerning for ?tumor lysis  no b symptoms or signs of malignancy, however given no hx of kidney disease and now requiring urgent dialysis will order ct/a/p  ?gallbladder etiology with elevated ALP and ggt     continue methadone,  continue to monitor for signs of withdrawal     patient also continued on abx for LLE cellulitis - for now ceftriaxone and vanc   also being followed by vascular service RLE -- may require further rle AKA after medically stabilized patient with severely elevated uric acid and JEAN PIERRE concerning for electrolytes concerning for ?tumor lysis  no b symptoms or signs of malignancy, however given no hx of kidney disease and now requiring urgent dialysis will order ct/a/p  ?gallbladder etiology with elevated ALP and ggt   will add labs for further work up of posisble nephritic syndrome    continue methadone,  continue to monitor for signs of withdrawal     patient also continued on abx for LLE cellulitis - for now ceftriaxone and vanc   also being followed by vascular service RLE -- may require further rle AKA after medically stabilized

## 2020-10-14 NOTE — PROGRESS NOTE ADULT - PROBLEM SELECTOR PLAN 7
Likely 2/2 ALEX vs ACD in setting of renal dysfunction. No signs of acute blood loss.   - B12 elevated 1793 and Folate 13, f/u iron studies   - Transfuse Hg<7  - Maintain active Type and Screen

## 2020-10-14 NOTE — PROGRESS NOTE ADULT - PROBLEM SELECTOR PLAN 8
Presented with troponemia in setting of ARF with no ischemic changes on EKG and elevated proBNP of 7428. Unlikely ACS, probably in setting of JEAN PIERRE.  -Trended Trop to peak  -TTE normal with EF 64%

## 2020-10-14 NOTE — PROGRESS NOTE ADULT - PROBLEM SELECTOR PLAN 3
Presenting with LLE cellulitis 2/2 ulceration. s/p bilateral BKA RLE w/ probable chronic limb ischemia and LLE concerning for cellulitis  - LE dopplers r/o DVT showed no DVT   - s/p vancomycin and zosyn in the ED   - Vascular surgery consulted and recommended IV abx; no interventions at this time and signed off today  - wound care consult  - C/w with CTX 2mg IVPB QD    #UTI   -UA+  -C/w with CTX 2mg IVPB QD

## 2020-10-15 NOTE — PROGRESS NOTE ADULT - PROBLEM SELECTOR PLAN 7
Normocytic anemia most likely 2/2 ACD vs ALEX in setting of renal dysfunction. No signs of acute blood loss.   - B12 elevated 1793 and Folate 13, iron studies consistent with mixed picture ALEX and ACD   -10/15, patient's anemia is improving, hemoglobin now at 9.3  - Transfuse Hg<7  - Maintain active Type and Screen

## 2020-10-15 NOTE — PROGRESS NOTE ADULT - PROBLEM SELECTOR PLAN 6
Recent incarceration, endorses cocaine, heroin (5 days ago) and K2 use. Now on methadone 90mg qd. Utox on admission positive for opiods, cocaine, benzos and methadone   - confirmed methadone dosing 90mg qd - [phone] [facility] [address], [place] - [id number]  -C/w methadone 90mg qd  - Avoid beta blockers  - HIV neg, positive Hep C antibody   - TB quant negative   - Monitor for signs of withdrawal, on CIWA protocol for now  - patient endorses IV drug use with no specific location of use, continue to treat patient with Vancomycin and Ceftriaxone for endocarditis prophylaxis in the setting of cellulitis as stated above

## 2020-10-15 NOTE — PROGRESS NOTE ADULT - ASSESSMENT
39M PMHx heroin abuse on methadone x6 months, smokes K2 daily, b/l BKA following an accident, presents with ARF, uraemia, hyperphosphataemia and increased uric acid. Unclear aetiology, GN w/u underway. Concern for TLS.     #Acute renal failure, uraemia, oliguria-anuria likely 2/2 to acute urate nephropathy  #R/o GN, gammaglobinopathy  S/p RIJ temp cath 10/13  S/p first HD 10/13; 2nd 10/14    Renal sono as below  RIGHT KIDNEY: Length: 10.6 cm Hydronephrosis: Mildly dilated renal calyces without dilated renal pelvis. Echogenicity: Increased  LEFT KIDNEY: Length: 11 cm Hydronephrosis: Fullness Echogenicity: Increased    Polysubstance abuse- cocaine, methadone, opiate, benzo +Utox; possible toxic ingestion? +Osmol gap; rhabdo r/o, no evidence of obstruction  UA +blood, RBCs, prot, bacteria; FeNa 9.01%  Urine output improving to 400cc in 24 hours; trial with IVF saline @60cc/hr  R/o GN- cryo, antiGBM, TITI, ser MAMADOU, UPEP, urine immonofixation- pending  ANCA, complements, antidsDNA, RF wnl; 2 IgG lambda bands identified- recommend Haem consult  S/p rasburicase 6mg dose 10/14- uric acid down to 3.9    #Clinically hypovolaemic- IVF 60cc/hr  #HAGMA from renal failure  #Metalkolsis- low urine chlorids <20- GIT losses, likely 2/2 emesis  #Resp alk?Compensation? pH 7.4, pCO2 48  #Anaemia- iron sat 35%, ferritin 280s  #BMD- hyperphos, low iCa resolved- managed with HD;  while hypocalcaemic

## 2020-10-15 NOTE — CONSULT NOTE ADULT - SUBJECTIVE AND OBJECTIVE BOX
HEMATOLOGY/ONCOLOGY CONSULT NOTE  39M Undomiciled PMH Heroin abuse (on methadone) with spinal cord injury c/b b/l BKA 8-10 years ago ([facility]) now presents with worsening fatigue for the last 2 weeks, in the setting of 2 months of nausea and vomiting. Denies fever, chest pain, changes in mental status, cough, hemoptysis, weight loss, night sweats. Of note patient reports that he was recently incarcerated for 6 months and was released in July of this year, he reports that he had blood work done and there was no mention of renal dysfunction. He does not follow up with a primary care physician and does not take any medication other than methadone [phone]. He last used heroin 5 days ago but is making a concerted effort to stop using.        In the ED VS T 98 HR 99 /76 R16 96% on RA  Labs: WBC 7 | Hg/Hct 11/35 MCV 77 | Plt 577 | Na 140 K 5.4 | Co2 31 AG 33 BUN/ Cr 192/9.92 | TP/ Alb 8.8/ 2/2 | AST/ALT 22/10  |  EKG: NSR Qtc 468 peaked T waves in V3-V5 unknown baseline, LVH.  CXR - no focal consolidations    ED Course: Famotidine 20, Zofran 4 mg and 2L NS. Nephrology and Vacular Surgery consulted      (12 Oct 2020 17:44)        Allergies    No Known Allergies    Intolerances            MEDICATIONS  (STANDING):  cefTRIAXone   IVPB 2000 milliGRAM(s) IV Intermittent every 24 hours  heparin   Injectable 5000 Unit(s) SubCutaneous every 12 hours  influenza   Vaccine 0.5 milliLiter(s) IntraMuscular once  methadone    Tablet 90 milliGRAM(s) Oral daily  Nephro-dianne 1 Tablet(s) Oral daily  sodium chloride 0.9%. 1000 milliLiter(s) (60 mL/Hr) IV Continuous <Continuous>    MEDICATIONS  (PRN):  LORazepam   Injectable 2 milliGRAM(s) IV Push every 2 hours PRN Symptom-triggered: each CIWA -Ar score 8 or GREATER  ondansetron Injectable 4 milliGRAM(s) IV Push every 6 hours PRN Nausea and/or Vomiting    Vital Signs Last 24 Hrs  T(C): 36.9 (10-15-20 @ 16:33), Max: 36.9 (10-15-20 @ 16:33)  T(F): 98.5 (10-15-20 @ 16:33), Max: 98.5 (10-15-20 @ 16:33)  HR: 89 (10-15-20 @ 16:33) (83 - 90)  BP: 149/85 (10-15-20 @ 16:33) (135/82 - 156/80)  BP(mean): --  RR: 18 (10-15-20 @ 16:33) (18 - 18)  SpO2: 97% (10-15-20 @ 16:33) (96% - 97%)    PHYSICAL EXAM:  Constitutional: NAD, poorly groomed, cahectic  HEENT: PERRLA, EOMI, Normal Hearing, MMM  Neck: No LAD, No JVD  Back: Normal spine flexure, No CVA tenderness  Respiratory: CTAB  Cardiovascular: S1 and S2, RRR, no M/G/R  Gastrointestinal: BS+, soft, NT/ND  Extremities: No peripheral edema, s/p bl BKA        CBC Full  -  ( 15 Oct 2020 06:16 )  WBC Count : 6.27 K/uL  RBC Count : 3.83 M/uL  Hemoglobin : 9.3 g/dL  Hematocrit : 30.9 %  Platelet Count - Automated : 336 K/uL  Mean Cell Volume : 80.7 fl  Mean Cell Hemoglobin : 24.3 pg  Mean Cell Hemoglobin Concentration : 30.1 gm/dL  Auto Neutrophil # : x  Auto Lymphocyte # : x  Auto Monocyte # : x  Auto Eosinophil # : x  Auto Basophil # : x  Auto Neutrophil % : x  Auto Lymphocyte % : x  Auto Monocyte % : x  Auto Eosinophil % : x  Auto Basophil % : x          Iron Total, Serum: 38 ug/dL (10-12 @ 15:53)  Iron - Total Binding Capacity.: 110 ug/dL (10-12 @ 15:53)  Ferritin, Serum: 286 ng/mL (10-12 @ 15:53)    10-15    136  |  91<L>  |  43<H>  ----------------------------<  62<L>  3.7   |  25  |  4.09<H>    Ca    8.4      15 Oct 2020 06:16  Phos  6.6     10-15  Mg     2.0     10-15    TPro  7.8  /  Alb  1.7<L>  /  TBili  0.4  /  DBili  x   /  AST  23  /  ALT  12  /  AlkPhos  676<H>  10-15    Pathology:

## 2020-10-15 NOTE — PROGRESS NOTE ADULT - PROBLEM SELECTOR PLAN 4
-ALP level yesterday 598, today 676  -Bilirubin level has been normal since admission  -Abdominal U/S will be performed later today to see if a picture of intrahepatic vs. extrahepatic cholestasis can be determined  -in the setting of elevated GGT and Hep C, could be due to intrahepatic cholestasis -ALP level yesterday 598, today 676  -Bilirubin level has been normal since admission  -in the setting of elevated GGT and Hep C, could be due to intrahepatic cholestasis  - f/u Abdominal U/S

## 2020-10-15 NOTE — PROGRESS NOTE ADULT - ATTENDING COMMENTS
patient's uric acid and creatinine improving after dialysis  continues to have voming and intermittent diarrhea  KUB showing stool burden - will monitor diarrhea and consider bowel regimen     will give reglan today to give PO contrast  f/u with CT a/pelvis today

## 2020-10-15 NOTE — PROGRESS NOTE ADULT - PROBLEM SELECTOR PLAN 3
-Patient with 2 months of diarrhea, denies antibiotic use  -Stool PCR negative  -F/u C.diff results  -F/u CT abdomen to see if there is any existing chronic obstruction contributing to this diarrhea. -Patient with 2 months of diarrhea, denies antibiotic use  -Stool PCR negative  - abdominal xray this AM negative for obstruction causing N/V/D  -F/u C.diff results  -F/u CT abdomen as above

## 2020-10-15 NOTE — PROGRESS NOTE ADULT - ASSESSMENT
39M Undomiciled PMH Heroin abuse (on methadone) with spinal cord injury c/b b/l BKA 8-10 years ago ([facility]) presenting with 2 months of nausea, vomiting, and diarrhea, as well as 2 weeks of worsening fatigue, is found to be uremic and hyperkalemic. Now on hemodialysis and being managed for LLE cellulitis and determination of presenting JEAN PIERRE etiology.

## 2020-10-15 NOTE — DISCHARGE NOTE PROVIDER - CARE PROVIDER_API CALL
ZAIRA GTZ  [id number]  [address] [place] [address]  Phone: [phone]  Fax: [phone]  Established Patient  Follow Up Time: 1 week

## 2020-10-15 NOTE — PROGRESS NOTE ADULT - SUBJECTIVE AND OBJECTIVE BOX
Patient is a 39y Male seen and evaluated at bedside. Increasing urine output- signs of renal recovery. Biclonal gammopathy identified. BP controlled.    Meds:    cefTRIAXone   IVPB 2000 every 24 hours  heparin   Injectable 5000 every 12 hours  influenza   Vaccine 0.5 once  LORazepam   Injectable 2 every 2 hours PRN  methadone    Tablet 90 daily  Nephro-dianne 1 daily  ondansetron Injectable 4 every 6 hours PRN  sodium chloride 0.9%. 1000 <Continuous>      T(C): , Max: 36.8 (10-14-20 @ 21:00)  T(F): , Max: 98.2 (10-14-20 @ 21:00)  HR: 83 (10-15-20 @ 05:42)  BP: 135/82 (10-15-20 @ 05:42)  BP(mean): --  RR: 18 (10-15-20 @ 05:42)  SpO2: 97% (10-15-20 @ 05:42)  Wt(kg): --    10-14 @ 07:01  -  10-15 @ 07:00  --------------------------------------------------------  IN: 1400 mL / OUT: 800 mL / NET: 600 mL    Review of Systems: +nausea/diarrhoea, denies SOB, leg swelling    PHYSICAL EXAM:  GENERAL: AAOx3, emaciated, reduced skin turgour  CHEST/LUNG: Clear to auscultation bilaterally  HEART: normal S1S2, RRR  ABDOMEN: Soft, Nontender, non distended  EXTREMITIES: B/l amputations, Left LE oedema    LABS:                        9.3    6.27  )-----------( 336      ( 15 Oct 2020 06:16 )             30.9     10-15    136  |  91<L>  |  43<H>  ----------------------------<  62<L>  3.7   |  25  |  4.09<H>    Ca    8.4      15 Oct 2020 06:16  Phos  6.6     10-15  Mg     2.0     10-15    TPro  7.8  /  Alb  1.7<L>  /  TBili  0.4  /  DBili  x   /  AST  23  /  ALT  12  /  AlkPhos  676<H>  10-15    Uric Acid, Serum: 3.9 mg/dL [3.4 - 8.8] (10-15 @ 06:16)              RADIOLOGY & ADDITIONAL STUDIES:

## 2020-10-15 NOTE — PROGRESS NOTE ADULT - PROBLEM SELECTOR PLAN 2
Presenting with LLE cellulitis 2/2 ulceration. s/p bilateral BKA RLE w/ probable chronic limb ischemia and LLE concerning for cellulitis  - LE dopplers r/o DVT showed no DVT   - s/p vancomycin and zosyn in the ED   - Vascular surgery consulted and recommended IV abx; no interventions at this time and signed off today  - wound care consult  - C/w with CTX 2mg IVPB QD  -Vancomycin trough will be ordered before next hemodialysis treatment in order to adequately dose this patient in theraputic range. Vancomycin necessary as patient endorses IVDU for prophylaxis against endocarditis in setting of cellulitis.   #UTI   -UA+  -C/w with CTX 2mg IVPB QD Presenting with LLE cellulitis 2/2 ulceration. s/p bilateral BKA RLE w/ probable chronic limb ischemia and LLE concerning for cellulitis  - LE dopplers r/o DVT showed no DVT   - s/p vancomycin and zosyn in the ED   - wound care consulted  - C/w with CTX 2mg IVPB QD  -Vancomycin trough will be ordered before next hemodialysis treatment in order to adequately dose this patient in theraputic range. Vancomycin necessary as patient endorses IVDU for prophylaxis against endocarditis in setting of cellulitis.     #UTI   -UA+  -C/w with CTX 2mg IVPB QD

## 2020-10-15 NOTE — PROGRESS NOTE ADULT - SUBJECTIVE AND OBJECTIVE BOX
INTERVAL HPI/OVERNIGHT EVENTS:  Patient was seen and examined at bedside. As per nurse and patient, patient had 5-6 episodes of NBNB vomiting and 1 episode of diarrhea last night. Patient complains that the Zofran he is currently taking for nausea and vomiting is "doing nothing". Patient denies: fever, chills, dizziness, weakness, HA, Changes in vision, CP, palpitations, SOB, cough, dysuria, LE edema. ROS otherwise negative.    VITAL SIGNS:  T(F): 98.2 (10-15-20 @ 05:42)  HR: 83 (10-15-20 @ 05:42)  BP: 135/82 (10-15-20 @ 05:42)  RR: 18 (10-15-20 @ 05:42)  SpO2: 97% (10-15-20 @ 05:42)  Wt(kg): --      10-14-20 @ 07:01  -  10-15-20 @ 07:00  --------------------------------------------------------  IN: 1400 mL / OUT: 800 mL / NET: 600 mL        PHYSICAL EXAM:    Constitutional: Patient is drowsy, curled up to the side of the bed resting  HEENT: NC/AT, PERRL, EOMI, anicteric sclera, no nasal discharge; uvula midline, dry mouth  Neck: supple; no JVD or thyromegaly  Respiratory: CTA B/L; no W/R/R, no retractions  Cardiac: +S1/S2; RRR; no M/R/G; PMI non-displaced  Gastrointestinal: soft, NT/ND; no rebound or guarding  Back: spine midline, no bony tenderness or step-offs; no CVAT B/L  Extremities: WWP, no clubbing or cyanosis; no peripheral edema  Musculoskeletal: NROM B/L arms; no joint swelling, tenderness or erythema  Vascular: 2+ radial, BKA stumps appear well profused  Dermatologic: few excorications throughout body and track markings, BKA wound appears to be dry with no active bleeding.  Neurologic: AAOx3; CNII-XII grossly intact; no focal deficits  Psychiatric: patient appears agitated  MEDICATIONS  (STANDING):  cefTRIAXone   IVPB 2000 milliGRAM(s) IV Intermittent every 24 hours  heparin   Injectable 5000 Unit(s) SubCutaneous every 12 hours  influenza   Vaccine 0.5 milliLiter(s) IntraMuscular once  iohexol 300 mG (iodine)/mL Oral Solution 30 milliLiter(s) Oral once  methadone    Tablet 90 milliGRAM(s) Oral daily  metoclopramide Injectable 10 milliGRAM(s) IV Push once  Nephro-dianne 1 Tablet(s) Oral daily  sodium chloride 0.9% Bolus 1000 milliLiter(s) IV Bolus once    MEDICATIONS  (PRN):  LORazepam   Injectable 2 milliGRAM(s) IV Push every 2 hours PRN Symptom-triggered: each CIWA -Ar score 8 or GREATER  ondansetron Injectable 4 milliGRAM(s) IV Push every 6 hours PRN Nausea and/or Vomiting      Allergies    No Known Allergies    Intolerances        LABS:                        9.3    6.27  )-----------( 336      ( 15 Oct 2020 06:16 )             30.9     10-15    136  |  91<L>  |  43<H>  ----------------------------<  62<L>  3.7   |  25  |  4.09<H>    Ca    8.4      15 Oct 2020 06:16  Phos  6.6     10-15  Mg     2.0     10-15    TPro  7.8  /  Alb  1.7<L>  /  TBili  0.4  /  DBili  x   /  AST  23  /  ALT  12  /  AlkPhos  676<H>  10-15          RADIOLOGY & ADDITIONAL TESTS:  Reviewed INTERVAL HPI/OVERNIGHT EVENTS:  Patient was seen and examined at bedside. As per nurse and patient, patient had 5-6 episodes of NBNB vomiting and 1 episode of diarrhea last night. Patient complains that the Zofran he is currently taking for nausea and vomiting is "doing nothing". Patient denies: fever, chills, dizziness, weakness, HA, Changes in vision, CP, palpitations, SOB, cough, dysuria, LE edema. ROS otherwise negative.    VITAL SIGNS:  T(F): 98.2 (10-15-20 @ 05:42)  HR: 83 (10-15-20 @ 05:42)  BP: 135/82 (10-15-20 @ 05:42)  RR: 18 (10-15-20 @ 05:42)  SpO2: 97% (10-15-20 @ 05:42)  Wt(kg): --      10-14-20 @ 07:01  -  10-15-20 @ 07:00  --------------------------------------------------------  IN: 1400 mL / OUT: 800 mL / NET: 600 mL        PHYSICAL EXAM:    Constitutional: Patient is drowsy, curled up to the side of the bed resting  HEENT: NC/AT, EOMI, no nasal discharge; uvula midline, dry mouth  Neck: supple; no JVD or thyromegaly  Respiratory: CTA B/L; no W/R/R, no retractions  Cardiac: +S1/S2; RRR; no M/R/G; PMI non-displaced  Gastrointestinal: soft, NT/ND; no rebound or guarding, abdomen scaphoid   Back: spine midline, no bony tenderness  Extremities: WWP, no clubbing or cyanosis; no peripheral edema  Musculoskeletal: NROM B/L arms; no joint swelling, tenderness or erythema  Vascular: 2+ radial, BKA stumps appear well profused  Dermatologic: few excorications throughout body and track markings, BKA wound appears to be dry with no active bleeding.  Neurologic: AAOx3; CNII-XII grossly intact; no focal deficits  Psychiatric: patient appears agitated    MEDICATIONS  (STANDING):  cefTRIAXone   IVPB 2000 milliGRAM(s) IV Intermittent every 24 hours  heparin   Injectable 5000 Unit(s) SubCutaneous every 12 hours  influenza   Vaccine 0.5 milliLiter(s) IntraMuscular once  iohexol 300 mG (iodine)/mL Oral Solution 30 milliLiter(s) Oral once  methadone    Tablet 90 milliGRAM(s) Oral daily  metoclopramide Injectable 10 milliGRAM(s) IV Push once  Nephro-dianne 1 Tablet(s) Oral daily  sodium chloride 0.9% Bolus 1000 milliLiter(s) IV Bolus once    MEDICATIONS  (PRN):  LORazepam   Injectable 2 milliGRAM(s) IV Push every 2 hours PRN Symptom-triggered: each CIWA -Ar score 8 or GREATER  ondansetron Injectable 4 milliGRAM(s) IV Push every 6 hours PRN Nausea and/or Vomiting      Allergies    No Known Allergies    Intolerances        LABS:                        9.3    6.27  )-----------( 336      ( 15 Oct 2020 06:16 )             30.9     10-15    136  |  91<L>  |  43<H>  ----------------------------<  62<L>  3.7   |  25  |  4.09<H>    Ca    8.4      15 Oct 2020 06:16  Phos  6.6     10-15  Mg     2.0     10-15    TPro  7.8  /  Alb  1.7<L>  /  TBili  0.4  /  DBili  x   /  AST  23  /  ALT  12  /  AlkPhos  676<H>  10-15          RADIOLOGY & ADDITIONAL TESTS:  Reviewed

## 2020-10-15 NOTE — PROGRESS NOTE ADULT - PROBLEM SELECTOR PLAN 5
Patient Hep antibody +, hep B and HIV neg  -Elevated Alkphos, AST/ALT wnl   - elev   - continue to trend LFTs Patient Hep antibody +, hep B and HIV neg  -Elevated Alkphos, AST/ALT wnl   - elev   - continue to trend LFTs, management as per above

## 2020-10-15 NOTE — PROGRESS NOTE ADULT - ATTENDING COMMENTS
I independently performed the key portions of the evaluation and management service provided. I agree with the above history, physical, and plan which I have reviewed and edited where appropriate. I find JEAN PIERRE, started on dialysis. MPGN related to hep c or cryos are unlikely given normal complements. Follow renal function. If no improvement, will biopsy.  See full note. (Patient seen earlier in day.)

## 2020-10-15 NOTE — DISCHARGE NOTE PROVIDER - HOSPITAL COURSE
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 Patient was found to have JEAN PIERRE with hyperkalemia and uremia  Problem List/Main Diagnoses (system-based):   Inpatient treatment course: Patient was admitted from ED to [address] [place] [address]. Due to elevated BUN/CR levels in the setting of JEAN PIERRE, patient was recommended for HD port placement by IR on 7/12 admission. He refused port placement during the IR procedure and it was not completed. On 7/13, patient was seen by vascular surgery team and Vanc/Zosyn treatment regimen was started for the patient for treatment of LLE cellulitis. Nephrology was consulted and recommended a full work-up for Glomerular disease and Monoclonal Gammopathy related diseases to determine etiology of JEAN PIERRE. IR procedure was performed successfully on this day for hemodialysis port placement. GI was also consulted on 7/13 and recommended U/S of liver due to elevated GGT and ALP to distinguish between Drug induced liver injury and cholestasis of sepsis. On 7/14, patient was admitted to 4 U[facility] floor.  Patient received hemodialysis with minor improvement in BUN/CR and other electrolytes.  Vascular surgery signed off on the patient and wound care team started following the patient. Nephrology team was still about etiology of JEAN PIERRE but added acute urate induced nephropathy to their differential diagnosis, they recommended trending uric acid levels daily. They also added tumor lysis syndrome to their differential.  7/15 patient's labs came back negative for most causes of glomerulonephritis, and negative for stool PCR. Patient was taken for CT imaging of chest, abdomen, and pelvis to see if there was tumors/metastasis present contributing to tumor lysis syndrome differential. Patient was also taken for abdominal U/S to determine the etiology of liver dysfunction contributing to elevated values of GGT and ALP.   New medications:   Labs to be followed outpatient:   Exam to be followed outpatient:

## 2020-10-15 NOTE — PROGRESS NOTE ADULT - PROBLEM SELECTOR PLAN 1
Presented with decreased urine output over last few days to weeks with elevated  and Cr 9.9,  unknown baseline. N/V of several weeks without associated abdominal pain most likely 2/2 uremia. Per nephrology, presentation concerning for possible tumor lysis syndrome  -S/p 2L of NS in ED and initial HD session on 10/13  -Patient presented with Hyperkalemia to 5.4 on admission with EKG showing peaked T waves isolate in V4.  This AM 10/15 K+ is within normal reference range and has trended this way for the past 2 days.  -renal ultrasound showed Mild right calyectasis without dilatation of the renal pelvis and medical renal disease.   -urine lytes revealing post-obstructive JEAN PIERRE  - Nephro following, concern for possible tumor lysis syndrome (2/2 necrotic liver tumors?) that caused elev uric acid, hyperkalemia, and hyperphosphatemia on admission  - received STAT dose Rasburicase 6mg this AM per nephrology for elevated uric acid 12.3  -Uric Acid level today post HD on 10/14 was 3.9, Uric Acid is trending down.  -Trend Cr and daily uric acid levels  -As per renal, renal biopsy scheduled for tomorrow 10/16 in order to determine etiology of kidney disease (they currently believe it is acute urate induced nephropathy.) Plan to place patient on NPO at midnight, do not give patient Subq heparin or aspirin today.  - continue Zofran 4mg IVP PRN nausea/vomiting (QTc 468), reassess QTc on repeat EKG  -patient was given Reglan 10 mg IV push because patient complained Zofran was not helping his nausea and vomiting symptoms.   -CT of abdomen and pelvis will be performed today to see if there is any cancer mets present that are contributing to a tumor lysis syndrome which could be a potential cause of his JEAN PIERRE. Presented with decreased urine output over last few days to weeks with elevated  and Cr 9.9,  unknown baseline. N/V of several weeks without associated abdominal pain most likely 2/2 uremia.   -S/p 2L of NS in ED and initial HD session on 10/13  -renal ultrasound showed Mild right calyectasis without dilatation of the renal pelvis and medical renal disease.   -urine lytes revealing post-obstructive JEAN PIERRE  -associated hyperkalemia to 5.4 on admission with EKG showing peaked T waves isolate in V4. This AM 10/15 K+ wnl  - Nephro following, concern for possible tumor lysis syndrome (2/2 necrotic liver tumors?) that caused elev uric acid, hyperkalemia, and hyperphosphatemia on admission  - s/p Rasburicase 6mg x1 on 10/14  -Uric Acid level today 3.9, continue to trend  -Trend Cr and daily uric acid levels  -As per renal, plan for renal biopsy scheduled for tomorrow 10/16 in order to determine etiology of kidney disease (most likely acute urate induced nephropathy) - NPO at midnight for biopsy, d/c heparin tomorrow  - continue Zofran 4mg IVP PRN nausea/vomiting (QTc 468)  -received Reglan 10 mg IV push x1 this AM for persisting N/V unresponsive to Zofran  -CT of abdomen and pelvis today to r/o GI pathology contributing to presentation and any cancer mets present that are contributing to a tumor lysis syndrome which could be contributing to JEAN PIERRE

## 2020-10-15 NOTE — CONSULT NOTE ADULT - ASSESSMENT
39M Undomiciled PMH Heroin abuse (on methadone) with spinal cord injury c/b b/l BKA 8-10 years ago ([facility]) now presents with worsening fatigue for the last 2 weeks, in the setting of 2 months of nausea and vomiting. Found to have new onset ARF, work up of which is showing two migrating IgG on immunofixation. Hematology is being consulted for further recommendations.    Paraproteinemia  Noted Ca 6.7, adjusted for albumin, Ca is wnl. Anemia with Hb around 9, no other cytopenias. Cr initially 9, now 4, getting HD. UA from admission with nephrotic range proteinuria. HIV negative, Hepatitic C +.    Please obtain:  -SPEP, serum free light chains, UPEP, Bence Beckett and urine IF  -will follow up on CT CAP ordered by primary team  -check serum cryoglobulins  -ESR is elevated, 136 indicating possibly inflammatory disorder, would strongly consider cryoglobulinemic vasculitis in DDx givne chronic hepatitis C    Will follow up on lab results. If indication of plasma cell dyscrasia will need BMBx, otherwise would consider renal biopsy.    D/w hematology service attending, Dr.Lewis Watson.

## 2020-10-16 NOTE — PROGRESS NOTE ADULT - PROBLEM SELECTOR PLAN 6
Recent incarceration, endorses cocaine, heroin (5 days ago) and K2 use. Now on methadone 90mg qd. Utox on admission positive for opiods, cocaine, benzos and methadone   - confirmed methadone dosing 90mg qd - [phone] [facility] [address], [place] - [id number]  -C/w methadone 90mg qd  - Avoid beta blockers  - HIV neg, positive Hep C antibody   - TB quant negative   - Monitor for signs of withdrawal, on CIWA protocol for now  - patient endorses IV drug use with no specific location of use, continue to treat patient with Vancomycin and Ceftriaxone for endocarditis prophylaxis in the setting of cellulitis as stated above

## 2020-10-16 NOTE — PROGRESS NOTE ADULT - PROBLEM SELECTOR PLAN 3
-Patient with 2 months of diarrhea, denies antibiotic use  -Stool PCR negative  - abdominal xray this AM negative for obstruction causing N/V/D  -F/u C.diff results  -F/u CT abdomen as above -Patient with 2 months of diarrhea, denies antibiotic use  - improving since admission  - abdominal xray negative for obstruction causing N/V/D  -CT of abdomen/and RUQ unremarkable for any obvious malignancies contributing to presentation  -Stool PCR negative  -F/u C.diff results

## 2020-10-16 NOTE — PROGRESS NOTE ADULT - ATTENDING COMMENTS
patient's nausea / vomiting improving however today at bedside took drink of water and immediately spit out - wonder if some psychopsychomatic component as well because when asked about what triggers vomiting patient denies pain, denied nausea preceding, and had barely swallowed prior to coughing it back out - have ruled out obstruction as well.   diarrhea improving  will continue reglan today as seems to be improving nausea     patient still requiring HD - will likely need outpatient dialysis     CT with contrast extravasated - so CT likely reflecting non con  may need IV contrast for better malignancy w/u, however patient can obtain outpatient when nausea/vomiting further improves     will likely be discharged  with further outpatient f/u and HD - will discuss with nephrology and heme onc teams to set up follow up and will continue talks with case management for safest dispo

## 2020-10-16 NOTE — PROGRESS NOTE ADULT - ATTENDING COMMENTS
I independently performed the key portions of the evaluation and management service provided. I agree with the above history, physical, and plan which I have reviewed and edited where appropriate. I find JEAN PIERRE, started on dialysis. MPGN related to hep c or cryos are unlikely given normal B3Auztri renal function. If no improvement, will biopsy.  See full note. (Patient seen earlier in day.) .

## 2020-10-16 NOTE — PROGRESS NOTE ADULT - PROBLEM SELECTOR PLAN 5
Patient Hep antibody +, hep B and HIV neg  -Elevated Alkphos, AST/ALT wnl   - elev   - continue to trend LFTs, management as per above

## 2020-10-16 NOTE — PROGRESS NOTE ADULT - PROBLEM SELECTOR PLAN 1
Presented with decreased urine output over last few days to weeks with elevated  and Cr 9.9,  unknown baseline. N/V of several weeks without associated abdominal pain most likely 2/2 uremia.   -S/p 2L of NS in ED and initial HD session on 10/13  -renal ultrasound showed Mild right calyectasis without dilatation of the renal pelvis and medical renal disease.   -urine lytes revealing post-obstructive JEAN PIERRE  -associated hyperkalemia to 5.4 on admission with EKG showing peaked T waves isolate in V4. 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 N/V of several weeks without associated abdominal pain most likely 2/2 uremia.   -S/p 2L of NS in ED and initial HD session on 10/13  -renal ultrasound showed Mild right calyectasis without dilatation of the renal pelvis and medical renal disease.   -urine lytes revealing post-obstructive JEAN PIERRE  -associated hyperkalemia to 5.4 on admission with EKG showing peaked T waves isolate in V4. 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 N/V of several weeks without associated abdominal pain most likely 2/2 uremia. S/p initial HD session on 10/13. Possibly 2/2 underlying malignancy based on hematological workup thus far.  -renal ultrasound showed Mild right calyectasis without dilatation of the renal pelvis and medical renal disease.   - urine lytes revealing post-obstructive JEAN PIERRE  - associated hyperkalemia to 5.4 and hyperuricemia and hyperphosphatemia on admission with EKG showing peaked T waves isolate in V4. Now resolved s/p HD and s/p Rasburicase 6mg x1 on 10/14.  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

## 2020-10-16 NOTE — PROGRESS NOTE ADULT - ASSESSMENT
39M PMHx heroin abuse on methadone x6 months, smokes K2 daily, b/l BKA following an accident, presents with ARF, uraemia, hyperphosphataemia and increased uric acid. Unclear aetiology, GN w/u underway.TLS not likely; repeat C3C4 for cyogluminaemia    #Acute renal failure, uraemia, oliguria-anuria likely 2/2 to acute urate nephropathy  #R/o GN, gammaglobinopathy  #Nephrotic range proteinuria  S/p RIJ temp cath 10/13  S/p first HD 10/13; 2nd 10/14; pt refusing HD today d/t nausea    Renal sono as below  RIGHT KIDNEY: Length: 10.6 cm Hydronephrosis: Mildly dilated renal calyces without dilated renal pelvis. Echogenicity: Increased  LEFT KIDNEY: Length: 11 cm Hydronephrosis: Fullness Echogenicity: Increased    Polysubstance abuse- cocaine, methadone, opiate, benzo +Utox; possible toxic ingestion? +Osmol gap; rhabdo r/o, no evidence of obstruction  UA +blood, RBCs, prot, bacteria; FeNa 9.01%; Urine PCR 24.6  Urine output ~500cc in 24 hours; continue IVF saline @60cc/hr    R/o GN- cryo, antiGBM, TITI, ser MAMADOU, UPEP, urine immonofixation, repeat C3C4- pending  ANCA, complements, antidsDNA, RF wnl; 2 IgG lambda bands identified- recommend Haem consult  S/p rasburicase 6mg dose 10/14- uric acid down to 1.4  Haem/onc involved  Plan for renal biopsy next Tuesday    #Clinically hypovolaemic- IVF 60cc/hr  #HAGMA from renal failure  #Metalkolsis- low urine chloride <20- GIT losses, likely 2/2 emesis  #Resp alk?Compensation? pH 7.4, pCO2 48  #Anaemia- iron sat 35%, ferritin 280s  #BMD- hyperphos, low iCa resolved- managed with HD;  while hypocalcaemic

## 2020-10-16 NOTE — PROGRESS NOTE ADULT - PROBLEM SELECTOR PLAN 7
Normocytic anemia most likely 2/2 ACD vs ALEX in setting of renal dysfunction. No signs of acute blood loss.   - B12 elevated 1793 and Folate 13, iron studies consistent with mixed picture ALEX and ACD   -10/15, patient's anemia is improving, hemoglobin now at 9.3  - Transfuse Hg<7  - Maintain active Type and Screen Normocytic anemia most likely 2/2 ACD vs ALEX in setting of renal dysfunction. No signs of acute blood loss.   - B12 elevated 1793 and Folate 13, iron studies consistent with mixed picture ALEX and ACD   - Transfuse Hg<7  - Maintain active Type and Screen

## 2020-10-16 NOTE — PROGRESS NOTE ADULT - ASSESSMENT
39M Undomiciled [facility] Heroin abuse (on methadone) with spinal cord injury c/b b/l BKA 8-10 years ago ([facility]) presenting with 2 months of nausea, vomiting, and diarrhea, as well as 2 weeks of worsening fatigue, is found to be uremic and hyperkalemic. S/p hemodialysis and pending further workup for nephropathy.

## 2020-10-16 NOTE — PROGRESS NOTE ADULT - PROBLEM SELECTOR PLAN 2
Presenting with LLE cellulitis 2/2 ulceration. s/p bilateral BKA RLE w/ probable chronic limb ischemia and LLE concerning for cellulitis  - LE dopplers r/o DVT showed no DVT   - s/p vancomycin and zosyn in the ED   - wound care consulted  - C/w with CTX 2mg IVPB QD  -Vancomycin trough will be ordered before next hemodialysis treatment in order to adequately dose this patient in theraputic range. Vancomycin necessary as patient endorses IVDU for prophylaxis against endocarditis in setting of cellulitis.     #UTI   -UA+  -C/w with CTX 2mg IVPB QD

## 2020-10-16 NOTE — PROGRESS NOTE ADULT - SUBJECTIVE AND OBJECTIVE BOX
Patient is a 39y Male seen and evaluated at bedside. +Nausea. Low urine output. BUN/creat worsening. Renal recovery not likely.    Meds:    cefTRIAXone   IVPB 2000 every 24 hours  heparin   Injectable 5000 every 12 hours  influenza   Vaccine 0.5 once  LORazepam   Injectable 2 every 2 hours PRN  methadone    Tablet 90 daily  metoclopramide Injectable 5 every 6 hours PRN  Nephro-dianne 1 daily  ondansetron Injectable 4 every 6 hours PRN  sodium chloride 0.9%. 1000 <Continuous>      T(C): , Max: 36.9 (10-15-20 @ 16:33)  T(F): , Max: 98.5 (10-15-20 @ 16:33)  HR: 74 (10-16-20 @ 09:24)  BP: 143/758 (10-16-20 @ 09:24)  BP(mean): --  RR: 18 (10-16-20 @ 09:24)  SpO2: 99% (10-16-20 @ 09:24)  Wt(kg): --    10-15 @ 07:01  -  10-16 @ 07:00  --------------------------------------------------------  IN: 60 mL / OUT: 550 mL / NET: -490 mL    Review of Systems: +nausea, no CP, SOB    PHYSICAL EXAM:  GENERAL: AAOx3, emaciated, reduced skin turgour  CHEST/LUNG: Clear to auscultation bilaterally  HEART: normal S1S2, RRR  ABDOMEN: Soft, Nontender, non distended  EXTREMITIES: B/l amputations, Left LE oedema  ACCESS: Right temp cath     LABS:                        9.4    7.98  )-----------( 349      ( 16 Oct 2020 07:18 )             32.4     10-16    136  |  89<L>  |  50<H>  ----------------------------<  74  4.0   |  28  |  5.58<H>    Ca    8.2<L>      16 Oct 2020 07:18  Phos  8.3     10-16  Mg     2.1     10-16    TPro  7.7  /  Alb  1.9<L>  /  TBili  0.4  /  DBili  x   /  AST  26  /  ALT  13  /  AlkPhos  671<H>  10-16    Uric Acid, Serum: 1.4 mg/dL <L> [3.4 - 8.8] (10-16 @ 07:18)    PT/INR - ( 16 Oct 2020 07:18 )   PT: 14.4 sec;   INR: 1.21          PTT - ( 16 Oct 2020 07:18 )  PTT:30.3 sec    Creatinine, Random Urine: 21 mg/dL (10-16 @ 11:07)  Protein/Creatinine Ratio Calculation: 24.6 Ratio (10-16 @ 11:07)        RADIOLOGY & ADDITIONAL STUDIES:

## 2020-10-16 NOTE — PROGRESS NOTE ADULT - SUBJECTIVE AND OBJECTIVE BOX
OVERNIGHT EVENTS:RIMA    SUBJECTIVE / INTERVAL HPI: Patient seen and examined at bedside.   Reports N/V improved with Reglan.   Says still had 2 ep of diarrhea overnight but improved from admission.  Denies fevers, chills, CP, SOB, cough, abdominal pain.      VITAL SIGNS:  Vital Signs Last 24 Hrs  T(C): 36.6 (16 Oct 2020 09:24), Max: 36.9 (15 Oct 2020 16:33)  T(F): 97.9 (16 Oct 2020 09:24), Max: 98.5 (15 Oct 2020 16:33)  HR: 74 (16 Oct 2020 09:24) (74 - 89)  BP: 143/758 (16 Oct 2020 09:24) (133/75 - 149/85)  BP(mean): --  RR: 18 (16 Oct 2020 09:24) (17 - 18)  SpO2: 99% (16 Oct 2020 09:24) (96% - 99%)    PHYSICAL EXAM:    General: Cachectic, NAD, laying comfortably in bed  HEENT: NC/AT; PERRL, anicteric sclera; MMM  Neck: supple, no JVD  Cardiovascular: +S1/S2, RRR; no murmurs, rubs, gallops  Respiratory: CTAB; no wheezes, rales, or rhonchi  Gastrointestinal: soft, NT/ND, no masses palpated; no rebound tenderness or guarding; +BS  Extremities: Residual limb wounds appear improved from prior, dry and without drainage; no edema or erythema noted  Vascular: 2+ radial, DP pulses B/L  Neurological: AAOx3; no focal deficits; PATRICK    MEDICATIONS:  MEDICATIONS  (STANDING):  cefTRIAXone   IVPB 2000 milliGRAM(s) IV Intermittent every 24 hours  heparin   Injectable 5000 Unit(s) SubCutaneous every 12 hours  influenza   Vaccine 0.5 milliLiter(s) IntraMuscular once  methadone    Tablet 90 milliGRAM(s) Oral daily  Nephro-dianne 1 Tablet(s) Oral daily  sodium chloride 0.9%. 1000 milliLiter(s) (60 mL/Hr) IV Continuous <Continuous>    MEDICATIONS  (PRN):  LORazepam   Injectable 2 milliGRAM(s) IV Push every 2 hours PRN Symptom-triggered: each CIWA -Ar score 8 or GREATER  metoclopramide Injectable 5 milliGRAM(s) IV Push every 6 hours PRN Nausea, vomiting  ondansetron Injectable 4 milliGRAM(s) IV Push every 6 hours PRN Nausea and/or Vomiting      ALLERGIES:  Allergies    No Known Allergies    Intolerances        LABS:                        9.4    7.98  )-----------( 349      ( 16 Oct 2020 07:18 )             32.4     10-16    136  |  89<L>  |  50<H>  ----------------------------<  74  4.0   |  28  |  5.58<H>    Ca    8.2<L>      16 Oct 2020 07:18  Phos  8.3     10-16  Mg     2.1     10-16    TPro  7.7  /  Alb  1.9<L>  /  TBili  0.4  /  DBili  x   /  AST  26  /  ALT  13  /  AlkPhos  671<H>  10-16    PT/INR - ( 16 Oct 2020 07:18 )   PT: 14.4 sec;   INR: 1.21          PTT - ( 16 Oct 2020 07:18 )  PTT:30.3 sec    CAPILLARY BLOOD GLUCOSE      POCT Blood Glucose.: 80 mg/dL (15 Oct 2020 12:17)      RADIOLOGY & ADDITIONAL TESTS: Reviewed.    PLAN: OVERNIGHT EVENTS: RIMA    SUBJECTIVE / INTERVAL HPI: Patient seen and examined at bedside.   Reports N/V improved with Reglan.   Says still had 2 ep of diarrhea overnight but improved from admission.  Denies fevers, chills, CP, SOB, cough, abdominal pain.      VITAL SIGNS:  Vital Signs Last 24 Hrs  T(C): 36.6 (16 Oct 2020 09:24), Max: 36.9 (15 Oct 2020 16:33)  T(F): 97.9 (16 Oct 2020 09:24), Max: 98.5 (15 Oct 2020 16:33)  HR: 74 (16 Oct 2020 09:24) (74 - 89)  BP: 143/758 (16 Oct 2020 09:24) (133/75 - 149/85)  BP(mean): --  RR: 18 (16 Oct 2020 09:24) (17 - 18)  SpO2: 99% (16 Oct 2020 09:24) (96% - 99%)    PHYSICAL EXAM:    General: Cachectic, NAD, laying comfortably in bed  HEENT: NC/AT; PERRL, anicteric sclera; MMM  Neck: supple, no JVD  Cardiovascular: +S1/S2, RRR; no murmurs, rubs, gallops  Respiratory: CTAB; no wheezes, rales, or rhonchi  Gastrointestinal: soft, NT/ND, no masses palpated; no rebound tenderness or guarding; +BS  Extremities: Residual limb wounds appear improved from prior, dry and without drainage; no edema or erythema noted  Vascular: 2+ radial, DP pulses B/L  Neurological: AAOx3; no focal deficits; PATRICK    MEDICATIONS:  MEDICATIONS  (STANDING):  cefTRIAXone   IVPB 2000 milliGRAM(s) IV Intermittent every 24 hours  heparin   Injectable 5000 Unit(s) SubCutaneous every 12 hours  influenza   Vaccine 0.5 milliLiter(s) IntraMuscular once  methadone    Tablet 90 milliGRAM(s) Oral daily  Nephro-dianne 1 Tablet(s) Oral daily  sodium chloride 0.9%. 1000 milliLiter(s) (60 mL/Hr) IV Continuous <Continuous>    MEDICATIONS  (PRN):  LORazepam   Injectable 2 milliGRAM(s) IV Push every 2 hours PRN Symptom-triggered: each CIWA -Ar score 8 or GREATER  metoclopramide Injectable 5 milliGRAM(s) IV Push every 6 hours PRN Nausea, vomiting  ondansetron Injectable 4 milliGRAM(s) IV Push every 6 hours PRN Nausea and/or Vomiting      ALLERGIES:  Allergies    No Known Allergies    Intolerances        LABS:                        9.4    7.98  )-----------( 349      ( 16 Oct 2020 07:18 )             32.4     10-16    136  |  89<L>  |  50<H>  ----------------------------<  74  4.0   |  28  |  5.58<H>    Ca    8.2<L>      16 Oct 2020 07:18  Phos  8.3     10-16  Mg     2.1     10-16    TPro  7.7  /  Alb  1.9<L>  /  TBili  0.4  /  DBili  x   /  AST  26  /  ALT  13  /  AlkPhos  671<H>  10-16    PT/INR - ( 16 Oct 2020 07:18 )   PT: 14.4 sec;   INR: 1.21          PTT - ( 16 Oct 2020 07:18 )  PTT:30.3 sec    CAPILLARY BLOOD GLUCOSE      POCT Blood Glucose.: 80 mg/dL (15 Oct 2020 12:17)      RADIOLOGY & ADDITIONAL TESTS: Reviewed.    PLAN:

## 2020-10-16 NOTE — PROGRESS NOTE ADULT - PROBLEM SELECTOR PLAN 4
-ALP level yesterday 598, today 676  -Bilirubin level has been normal since admission  -in the setting of elevated GGT and Hep C, could be due to intrahepatic cholestasis  - f/u Abdominal U/S -Alk phos continues to be elevated, today 671, similar to yesterday  -Bilirubin level has been normal since admission  -in the setting of elevated GGT and Hep C, could be due to intrahepatic cholestasis  - RUQ US negative for biliary pathology, bile ducts not dilated, echogenic portal triads consistent with chronic hepC infection

## 2020-10-16 NOTE — CHART NOTE - NSCHARTNOTEFT_GEN_A_CORE
Admitting Diagnosis:   Patient is a 39y old  Male who presents with a chief complaint of Nausea vomiting, diarrhea, JEAN PIERRE (15 Oct 2020 11:33)    PAST MEDICAL & SURGICAL HISTORY:  Drug abuse  K2    Heroin abuse    Above-knee amputation of right lower extremity    Above-knee amputation of left lower extremity    Current Nutrition Order:  renal +Nepro BID (each 425kcal/19gpro)    PO Intake: Good (%) [   ]  Fair (50-75%) [   ] Poor (<25%) [   ]    GI Issues:     Pain:    Skin Integrity:    Labs:   10-15    136  |  91<L>  |  43<H>  ----------------------------<  62<L>  3.7   |  25  |  4.09<H>    Ca    8.4      15 Oct 2020 06:16  Phos  6.6     10-15  Mg     2.0     10-15    TPro  7.8  /  Alb  1.7<L>  /  TBili  0.4  /  DBili  x   /  AST  23  /  ALT  12  /  AlkPhos  676<H>  10-15    CAPILLARY BLOOD GLUCOSE    POCT Blood Glucose.: 80 mg/dL (15 Oct 2020 12:17)  POCT Blood Glucose.: 63 mg/dL (15 Oct 2020 08:28)    Medications:  MEDICATIONS  (STANDING):  cefTRIAXone   IVPB 2000 milliGRAM(s) IV Intermittent every 24 hours  heparin   Injectable 5000 Unit(s) SubCutaneous every 12 hours  influenza   Vaccine 0.5 milliLiter(s) IntraMuscular once  methadone    Tablet 90 milliGRAM(s) Oral daily  Nephro-dianne 1 Tablet(s) Oral daily  sodium chloride 0.9%. 1000 milliLiter(s) (60 mL/Hr) IV Continuous <Continuous>    MEDICATIONS  (PRN):  LORazepam   Injectable 2 milliGRAM(s) IV Push every 2 hours PRN Symptom-triggered: each CIWA -Ar score 8 or GREATER  metoclopramide Injectable 5 milliGRAM(s) IV Push every 6 hours PRN Nausea, vomiting  ondansetron Injectable 4 milliGRAM(s) IV Push every 6 hours PRN Nausea and/or Vomiting    Admission Anthropometrics:  Height: 66" Weight: 100lbs, Adjusted IBW / BKA (5.9%) 134lbs+/-10%, %IBW 75%, Adjusted BMI 17.1,    Weight:  10/13 37.7kg/38.7kg  10/14 36.0kg/37.0kg    Weight Change:     Estimated energy needs:   ABW used for calculations as pt <75% adjusted IBW, adjusted for malnutrition, fluids per team 2/2 HD  30-35kcal/k-1585kcal  1.2-1.4g/k-63g protein    Subjective:     Previous Nutrition Diagnosis: Suspect severe PCM RT poor PO intake 2/2 nausea/vomiting AEB reported wt loss, muscle and fat loss, likely meeting <75% EER > 1 month    Active [ x ]  Resolved [   ]    If resolved, new PES:     Goal: Meet >75% EER, pt to show no further signs/symptoms of PCM    Recommendations:    Education:     Risk Level: High [   ] Moderate [   ] Low [   ] Admitting Diagnosis:   Patient is a 39y old  Male who presents with a chief complaint of Nausea vomiting, diarrhea, JEAN PIERRE (15 Oct 2020 11:33)    PAST MEDICAL & SURGICAL HISTORY:  Drug abuse  K2    Heroin abuse    Above-knee amputation of right lower extremity    Above-knee amputation of left lower extremity    Current Nutrition Order:  renal +Nepro BID (each 425kcal/19gpro)    PO Intake: Good (%) [   ]  Fair (50-75%) [   ] Poor (<25%) [ x  ]    GI Issues:   Pt reports continued nausea, on reglan and zofran   Per flowsheet last BM 10/15    Pain:  No pain noted    Skin Integrity:  generalized edema    Labs:   10-15    136  |  91<L>  |  43<H>  ----------------------------<  62<L>  3.7   |  25  |  4.09<H>    Ca    8.4      15 Oct 2020 06:16  Phos  6.6     10-15  Mg     2.0     10-15    TPro  7.8  /  Alb  1.7<L>  /  TBili  0.4  /  DBili  x   /  AST  23  /  ALT  12  /  AlkPhos  676<H>  10-15    CAPILLARY BLOOD GLUCOSE    POCT Blood Glucose.: 80 mg/dL (15 Oct 2020 12:17)  POCT Blood Glucose.: 63 mg/dL (15 Oct 2020 08:28)    Medications:  MEDICATIONS  (STANDING):  cefTRIAXone   IVPB 2000 milliGRAM(s) IV Intermittent every 24 hours  heparin   Injectable 5000 Unit(s) SubCutaneous every 12 hours  influenza   Vaccine 0.5 milliLiter(s) IntraMuscular once  methadone    Tablet 90 milliGRAM(s) Oral daily  Nephro-dianne 1 Tablet(s) Oral daily  sodium chloride 0.9%. 1000 milliLiter(s) (60 mL/Hr) IV Continuous <Continuous>    MEDICATIONS  (PRN):  LORazepam   Injectable 2 milliGRAM(s) IV Push every 2 hours PRN Symptom-triggered: each CIWA -Ar score 8 or GREATER  metoclopramide Injectable 5 milliGRAM(s) IV Push every 6 hours PRN Nausea, vomiting  ondansetron Injectable 4 milliGRAM(s) IV Push every 6 hours PRN Nausea and/or Vomiting    Admission Anthropometrics:  Height: 66" Weight: 100lbs, Adjusted IBW 2/2 BKA (5.9%) 134lbs+/-10%, %IBW 75%, Adjusted BMI 17.1,    Weight:  10/13 37.7kg/38.7kg  10/14 36.0kg/37.0kg    Weight Change: Note ~6lbs wt loss most likely 2/2 fluid shifts on HD. Please continue to trend wts.     Estimated energy needs:   ABW used for calculations as pt <75% adjusted IBW, adjusted for malnutrition, fluids per team 2/2 HD  30-35kcal/k-1585kcal  1.2-1.4g/k-63g protein    Subjective:   39M Undomiciled [facility] Heroin abuse (on methadone) with spinal cord injury c/b b/l BKA 8-10 years ago ([facility]) presenting with 2 months of nausea, vomiting, and diarrhea, as well as 2 weeks of worsening fatigue, is found to be uremic and hyperkalemic. Now on hemodialysis and being managed for LLE cellulitis and determination of presenting JEAN PIERRE etiology.     Pt seen in room, resting in bed. Pt now ordered for renal diet +Nepro BID (each 425kcal/19gpro). S/p HD 10/13 and 10/14. Pt reports continued poor PO intake 2/2 nausea, reports eating 0% of dinner last night. Observed cereal and juice on table in room. RD discussed importance of adequate PO intake, encouraged intake of ONS. Pt reports he will try to drink it today. Pt on reglan and zofran. On nephro-dianne. Please continue to trend daily wts. If renal function improves and k/phos wnl consider liberalizing renal features to give pt more options. Please see recs below. Will continue to follow per RD protocol.     Previous Nutrition Diagnosis: Suspect severe PCM RT poor PO intake 2/2 nausea/vomiting AEB reported wt loss, muscle and fat loss, likely meeting <75% EER > 1 month    Active [ x ]  Resolved [   ]    If resolved, new PES:     Goal: Meet >75% EER, pt to show no further signs/symptoms of PCM    Recommendations:  1. Continue renal diet +Nepro BID (each 425kcal/19gpro). Encourage intake  2. Monitor k/phos, if wnl consider liberalizing diet to regular to promote PO intake  3. Trend daily wts  4. Continue antiemetic   5. Continue nephro-dianne  6. RD to remain available prn    Education: Encouraged intake    Risk Level: High [ x ] Moderate [   ] Low [   ]

## 2020-10-17 NOTE — PROGRESS NOTE ADULT - ATTENDING COMMENTS
patient with acute change in mentation today -   not following commands, moaning  thought to be metabolic in setting of missed HD but labs stable   patient then developed episode of intermittent rhythmic shaking of RUE accompanied by not visually tracking and staring into distance  concern for acute stroke vs seizure activity   got urgent head CT with no acute changes  have consulted neurology team for further recommendations and will order vEEG

## 2020-10-17 NOTE — PROGRESS NOTE ADULT - PROBLEM SELECTOR PLAN 2
Presenting with LLE cellulitis 2/2 ulceration. s/p bilateral BKA RLE w/ probable chronic limb ischemia and LLE concerning for cellulitis  - LE dopplers r/o DVT showed no DVT   - s/p vancomycin and zosyn in the ED   - wound care consulted  - C/w with CTX 2mg IVPB QD  -Vancomycin trough will be ordered before next hemodialysis treatment in order to adequately dose this patient in theraputic range. Vancomycin necessary as patient endorses IVDU for prophylaxis against endocarditis in setting of cellulitis.     #UTI   -UA+  -C/w with CTX 2mg IVPB QD Presented with decreased urine output over last few days to weeks with elevated  and Cr 9.9, unknown baseline. N/V of several weeks without associated abdominal pain most likely 2/2 uremia. S/p initial HD session on 10/13. Possibly 2/2 underlying malignancy based on hematological workup thus far.  -renal ultrasound showed Mild right calyectasis without dilatation of the renal pelvis and medical renal disease.   - urine lytes revealing post-obstructive JEAN PIERRE  - associated hyperkalemia to 5.4 and hyperuricemia and hyperphosphatemia on admission with EKG showing peaked T waves isolate in V4. Now resolved s/p HD and s/p Rasburicase 6mg x1 on 10/14.  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

## 2020-10-17 NOTE — PROGRESS NOTE ADULT - PROBLEM SELECTOR PLAN 9
F: None   E: Replete electrolytes as needed, K>4, M>2  N: Renal Diet; additionally will obtain nutrition consult for malnutrition and cachetic appearance  A: Heparin Subq     FULL CODE  Dispo: [facility] Presented with troponemia in setting of ARF with no ischemic changes on EKG and elevated proBNP of 7428. Unlikely ACS, probably in setting of JEAN PIERRE.  -Trended Trop to peak  -TTE normal with EF 64%

## 2020-10-17 NOTE — PROGRESS NOTE ADULT - PROBLEM SELECTOR PLAN 8
Presented with troponemia in setting of ARF with no ischemic changes on EKG and elevated proBNP of 7428. Unlikely ACS, probably in setting of JEAN PIERRE.  -Trended Trop to peak  -TTE normal with EF 64% Normocytic anemia most likely 2/2 ACD vs ALEX in setting of renal dysfunction. No signs of acute blood loss.   - B12 elevated 1793 and Folate 13, iron studies consistent with mixed picture ALEX and ACD   - Transfuse Hg<7  - Maintain active Type and Screen

## 2020-10-17 NOTE — PROGRESS NOTE ADULT - ASSESSMENT
39M Undomiciled [facility] Heroin abuse (on methadone) with spinal cord injury c/b b/l BKA 8-10 years ago ([facility]) presenting with 2 months of nausea, vomiting, and diarrhea, as well as 2 weeks of worsening fatigue, is found to be uremic and hyperkalemic. S/p hemodialysis and pending further workup for nephropathy.

## 2020-10-17 NOTE — PROGRESS NOTE ADULT - PROBLEM SELECTOR PLAN 6
Recent incarceration, endorses cocaine, heroin (5 days ago) and K2 use. Now on methadone 90mg qd. Utox on admission positive for opiods, cocaine, benzos and methadone   - confirmed methadone dosing 90mg qd - [phone] [facility] [address], [place] - [id number]  -C/w methadone 90mg qd  - Avoid beta blockers  - HIV neg, positive Hep C antibody   - TB quant negative   - Monitor for signs of withdrawal, on CIWA protocol for now  - patient endorses IV drug use with no specific location of use, continue to treat patient with Vancomycin and Ceftriaxone for endocarditis prophylaxis in the setting of cellulitis as stated above Patient Hep antibody +, hep B and HIV neg  -Elevated Alkphos, AST/ALT wnl   - elev   - continue to trend LFTs, management as per above

## 2020-10-17 NOTE — PROGRESS NOTE ADULT - PROBLEM SELECTOR PLAN 5
Patient Hep antibody +, hep B and HIV neg  -Elevated Alkphos, AST/ALT wnl   - elev   - continue to trend LFTs, management as per above No history of gallbladder or biliary duct diagnoses. History of chronic HepC. No abdominal pain. Unclear cause of elevated Alk Phos at this time.  -in the setting of elevated GGT and Hep C, could be due to intrahepatic cholestasis  -Alk phos continues to be elevated, today 527  -Bilirubin level has been normal since admission  - RUQ US negative for biliary pathology, bile ducts not dilated, echogenic portal triads consistent with chronic hepC infection  - continue to trend Alkphos and monitor for any symptoms

## 2020-10-17 NOTE — CONSULT NOTE ADULT - ASSESSMENT
Acute onset of altered mental status  Differential includes seizure, cerebrovascular event, delirium due to infection or metabolic disturbances    Recommend: Head CT stat, then place video EEG  If he has another event of right limb shaking would give ativan 2mg IV x 1   Will follow

## 2020-10-17 NOTE — PROGRESS NOTE ADULT - ASSESSMENT
39M PMHx heroin abuse on methadone x6 months, smokes K2 daily, b/l BKA following an accident, presents with ARF, uraemia, hyperphosphataemia and increased uric acid. Unclear aetiology, GN w/u underway.TLS not likely; repeat C3C4 for cyogluminaemia    #Acute renal failure, uraemia, oliguria-anuria likely 2/2 to acute urate nephropathy  #R/o GN, paraproteinaemia  #Nephrotic range proteinuria  S/p RIJ temp cath 10/13  S/p first HD 10/13; 2nd 10/14; altered and non cooperative- not redirectable, unable to do HD    Renal sono: normal length, increased echogenicity  Polysubstance abuse- cocaine, methadone, opiate, benzo +Utox; possible toxic ingestion? +Osmol gap; rhabdo ruled out  UA +blood, RBCs, prot, bacteria; FeNa 9.01%; Urine PCR 24.6  Urine output ~300-400cc in 24 hours; continue IVF saline @60cc/hr    R/o GN- antiGBM, TITI- pending  ANCA, complements, antidsDNA, RF, cryo: wnl    2 IgG lambda bands identified, kappa/lambda ration 0.61; 2 weak gammaproteins in urine  Haem/onc involved    S/p rasburicase 6mg dose 10/14- uric acid down to 1.4  Tentative plan for renal biopsy next Tuesday    #Clinically hypovolaemic- IVF 60cc/hr  #HAGMA from renal failure  #Met alkalolsis- low urine chloride <20- GIT losses, likely 2/2 emesis  #Resp acid?Compensation? pH 7.4, pCO2 48  #Anaemia- iron sat 35%, ferritin 280s  #BMD- hyperphos, low iCa resolved- managed with HD;  while hypocalcaemic

## 2020-10-17 NOTE — CONSULT NOTE ADULT - SUBJECTIVE AND OBJECTIVE BOX
HPI: 39 year old man with a history of heroin use, spinal cord injury, b/l BKA presents with 2 months of nausea, vomiting, diarrhea, fatigue. He was found to be uremic and hyperkalemic, started on dialysis. However for the past few days he has been refusing HD.   This morning he was noticed to have an acute change in mental status, unresponsive, not following commands, and having intermittent right upper extremity shaking.     Data reviewed:  Labs:  10-17    137  |  89<L>  |  54<H>  ----------------------------<  75  3.5   |  22  |  6.92<H>    Ca    7.6<L>      17 Oct 2020 10:36  Phos  10.1     10-17  Mg     2.1     10-17    TPro  7.7  /  Alb  2.2<L>  /  TBili  0.3  /  DBili  x   /  AST  26  /  ALT  15  /  AlkPhos  527<H>  10-17                        9.1    7.53  )-----------( 355      ( 17 Oct 2020 10:36 )             29.7     Prior records: reviewed    Other problems:    ROS: unable to assess    PAST MEDICAL & SURGICAL HISTORY:  Drug abuse  K2    Heroin abuse    Above-knee amputation of right lower extremity    Above-knee amputation of left lower extremity        FAMILY HISTORY:      Social History:     Allergies    No Known Allergies    Intolerances        Home Medications:      Vital Signs Last 24 Hrs  T(C): 36.7 (17 Oct 2020 10:31), Max: 36.7 (17 Oct 2020 10:31)  T(F): 98 (17 Oct 2020 10:31), Max: 98 (17 Oct 2020 10:31)  HR: 92 (17 Oct 2020 10:31) (84 - 94)  BP: 117/74 (17 Oct 2020 10:31) (117/74 - 144/83)  BP(mean): --  RR: 16 (17 Oct 2020 10:31) (16 - 20)  SpO2: 98% (17 Oct 2020 10:31) (98% - 100%)    Exam:  Gen: very thin / cachectic, multiple scars on trunk and arms, poor hygeine    MS: eyes closed, no spontaneous speech, does not follow commands     CN: pupils intermittently dilated, intermittent vertical nystagmus and roving eye movements, face grossly symmetric    Motor: decreased muscle bulk throughout; normal tone; moves UE spontaneously     Sensory: unable to assess

## 2020-10-17 NOTE — PROGRESS NOTE ADULT - NUTRITIONAL ASSESSMENT
This patient has been assessed with a concern for Malnutrition and has been determined to have a diagnosis/diagnoses of Severe protein-calorie malnutrition and Underweight/BMI < 19.    This patient is being managed with:   Diet Renal Restrictions-  For patients receiving Renal Replacement - No Protein Restr No Conc K No Conc Phos Low Sodium  Supplement Feeding Modality:  Oral  Nepro Cans or Servings Per Day:  1       Frequency:  Two Times a day  Entered: Oct 14 2020  1:56PM    

## 2020-10-17 NOTE — PROGRESS NOTE ADULT - SUBJECTIVE AND OBJECTIVE BOX
OVERNIGHT EVENTS: Noted to be agitated, uncomfortable, and screaming throughout the night. Pulled out IV and refused AM labs. Was AAOx1-2 per night team.    SUBJECTIVE / INTERVAL HPI: Patient seen and examined at bedside.   Is awake but moaning and not speaking or following commands.  Curled up in bed and unable to participate in interview.    VITAL SIGNS:  Vital Signs Last 24 Hrs  T(C): 36.7 (17 Oct 2020 10:31), Max: 36.7 (17 Oct 2020 10:31)  T(F): 98 (17 Oct 2020 10:31), Max: 98 (17 Oct 2020 10:31)  HR: 92 (17 Oct 2020 10:31) (84 - 94)  BP: 117/74 (17 Oct 2020 10:31) (117/74 - 144/83)  BP(mean): --  RR: 16 (17 Oct 2020 10:31) (16 - 20)  SpO2: 98% (17 Oct 2020 10:31) (98% - 100%)    PHYSICAL EXAM:  General: Cachectic, mild distress, curled up in bed and intermittently moaning and staring into space  HEENT: NC/AT; PERRL, anicteric sclera; MMM  Neck: supple, no JVD  Cardiovascular: +S1/S2, RRR; no murmurs, rubs, gallops  Respiratory: CTAB; no wheezes, rales, or rhonchi  Gastrointestinal: soft, NT/ND, no masses palpated; no rebound tenderness or guarding; +BS  Extremities: Residual limb wounds dry and without drainage; no edema or erythema noted  Vascular: 2+ radial, DP pulses B/L  Neurological: AAOx0, not responding commands; intermittent shaking of R wrist and shaking of head    MEDICATIONS:  MEDICATIONS  (STANDING):  cefTRIAXone   IVPB 2000 milliGRAM(s) IV Intermittent every 24 hours  heparin   Injectable 5000 Unit(s) SubCutaneous every 12 hours  influenza   Vaccine 0.5 milliLiter(s) IntraMuscular once  methadone    Tablet 90 milliGRAM(s) Oral daily  Nephro-dianne 1 Tablet(s) Oral daily  sodium chloride 0.9%. 1000 milliLiter(s) (60 mL/Hr) IV Continuous <Continuous>  vancomycin    Solution 125 milliGRAM(s) Oral every 6 hours    MEDICATIONS  (PRN):  LORazepam   Injectable 2 milliGRAM(s) IV Push every 2 hours PRN Symptom-triggered: each CIWA -Ar score 8 or GREATER  metoclopramide Injectable 5 milliGRAM(s) IV Push every 6 hours PRN Nausea, vomiting      ALLERGIES:  Allergies    No Known Allergies    Intolerances        LABS:                        9.1    7.53  )-----------( 355      ( 17 Oct 2020 10:36 )             29.7     10-17    137  |  89<L>  |  54<H>  ----------------------------<  75  3.5   |  22  |  6.92<H>    Ca    7.6<L>      17 Oct 2020 10:36  Phos  10.1     10-17  Mg     2.1     10-17    TPro  7.7  /  Alb  2.2<L>  /  TBili  0.3  /  DBili  x   /  AST  26  /  ALT  15  /  AlkPhos  527<H>  10-17    PT/INR - ( 16 Oct 2020 07:18 )   PT: 14.4 sec;   INR: 1.21        PTT - ( 16 Oct 2020 07:18 )  PTT:30.3 sec        RADIOLOGY & ADDITIONAL TESTS: Reviewed.    PLAN:

## 2020-10-17 NOTE — PROGRESS NOTE ADULT - PROBLEM SELECTOR PLAN 1
Presented with decreased urine output over last few days to weeks with elevated  and Cr 9.9, unknown baseline. N/V of several weeks without associated abdominal pain most likely 2/2 uremia. S/p initial HD session on 10/13. Possibly 2/2 underlying malignancy based on hematological workup thus far.  -renal ultrasound showed Mild right calyectasis without dilatation of the renal pelvis and medical renal disease.   - urine lytes revealing post-obstructive JEAN PIERRE  - associated hyperkalemia to 5.4 and hyperuricemia and hyperphosphatemia on admission with EKG showing peaked T waves isolate in V4. Now resolved s/p HD and s/p Rasburicase 6mg x1 on 10/14.  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

## 2020-10-17 NOTE — PROGRESS NOTE ADULT - PROBLEM SELECTOR PLAN 7
Normocytic anemia most likely 2/2 ACD vs ALEX in setting of renal dysfunction. No signs of acute blood loss.   - B12 elevated 1793 and Folate 13, iron studies consistent with mixed picture ALEX and ACD   - Transfuse Hg<7  - Maintain active Type and Screen Recent incarceration, endorses cocaine, heroin (5 days ago) and K2 use. Now on methadone 90mg qd. Utox on admission positive for opiods, cocaine, benzos and methadone   - confirmed methadone dosing 90mg qd - [phone] [facility] [address], [place] - [id number]  -C/w methadone 90mg qd  - Avoid beta blockers  - HIV neg, positive Hep C antibody   - TB quant negative   - Monitor for signs of withdrawal, on CIWA protocol for now  - patient endorses IV drug use with no specific location of use, continue to treat patient with Vancomycin and Ceftriaxone for endocarditis prophylaxis in the setting of cellulitis as stated above

## 2020-10-17 NOTE — PROGRESS NOTE ADULT - PROBLEM SELECTOR PLAN 3
-Patient with 2 months of diarrhea, denies antibiotic use  - improving since admission  - abdominal xray negative for obstruction causing N/V/D  -CT of abdomen/and RUQ unremarkable for any obvious malignancies contributing to presentation  -Stool PCR negative  -F/u C.diff results Presenting with LLE cellulitis 2/2 ulceration. s/p bilateral BKA RLE w/ probable chronic limb ischemia and LLE concerning for cellulitis  - LE dopplers r/o DVT showed no DVT   - s/p vancomycin and zosyn in the ED   - wound care consulted  - C/w with CTX 2mg IVPB QD  - started on vanc 125mg PO q6h as per below for C diff    #UTI   -UA+  -C/w with CTX 2mg IVPB QD

## 2020-10-17 NOTE — PROGRESS NOTE ADULT - SUBJECTIVE AND OBJECTIVE BOX
Patient is a 39y Male seen and evaluated at bedside during HD. Altered, confused, non responsive to verbal cues.    Meds:    cefTRIAXone   IVPB 2000 every 24 hours  heparin   Injectable 5000 every 12 hours  influenza   Vaccine 0.5 once  LORazepam   Injectable 2 every 2 hours PRN  methadone    Tablet 90 daily  metoclopramide Injectable 5 every 6 hours PRN  Nephro-dianne 1 daily  sodium chloride 0.9%. 1000 <Continuous>  vancomycin    Solution 125 every 6 hours      T(C): , Max: 36.7 (10-17-20 @ 10:31)  T(F): , Max: 98 (10-17-20 @ 10:31)  HR: 92 (10-17-20 @ 10:31)  BP: 117/74 (10-17-20 @ 10:31)  BP(mean): --  RR: 16 (10-17-20 @ 10:31)  SpO2: 98% (10-17-20 @ 10:31)  Wt(kg): --    10-16 @ 07:01  -  10-17 @ 07:00  --------------------------------------------------------  IN: 890 mL / OUT: 330 mL / NET: 560 mL      Review of Systems: Unable to assess    PHYSICAL EXAM:  GENERAL: Altered, confused, emaciated  CHEST/LUNG: Clear to auscultation bilaterally  HEART: normal S1S2, RRR  ABDOMEN: Soft, Nontender, non distended  EXTREMITIES: B/l amputations, Left LE oedema  ACCESS: Right temp cath     LABS:                        9.1    7.53  )-----------( 355      ( 17 Oct 2020 10:36 )             29.7     10-17    137  |  89<L>  |  54<H>  ----------------------------<  75  3.5   |  22  |  6.92<H>    Ca    7.6<L>      17 Oct 2020 10:36  Phos  10.1     10-17  Mg     2.1     10-17    TPro  7.7  /  Alb  2.2<L>  /  TBili  0.3  /  DBili  x   /  AST  26  /  ALT  15  /  AlkPhos  527<H>  10-17    C3 Complement, Serum: 74 mg/dL <L> [81 - 157] (10-16 @ 15:58)  C4 Complement, Serum: 17 mg/dL [13 - 39] (10-16 @ 15:58)    PT/INR - ( 16 Oct 2020 07:18 )   PT: 14.4 sec;   INR: 1.21          PTT - ( 16 Oct 2020 07:18 )  PTT:30.3 sec    Creatinine, Random Urine: 21 mg/dL (10-16 @ 11:07)  Protein/Creatinine Ratio Calculation: 24.6 Ratio (10-16 @ 11:07)        RADIOLOGY & ADDITIONAL STUDIES:

## 2020-10-17 NOTE — PROGRESS NOTE ADULT - PROBLEM SELECTOR PLAN 4
-Alk phos continues to be elevated, today 671, similar to yesterday  -Bilirubin level has been normal since admission  -in the setting of elevated GGT and Hep C, could be due to intrahepatic cholestasis  - RUQ US negative for biliary pathology, bile ducts not dilated, echogenic portal triads consistent with chronic hepC infection -Patient with 2 months of diarrhea, denies antibiotic use  - improving since admission  - abdominal xray negative for obstruction causing N/V/D  -CT of abdomen/and RUQ unremarkable for any obvious malignancies contributing to presentation  -Stool PCR negative  -C. diff positive  - started on vanc 125mg PO q6h  - continue contact precautions.

## 2020-10-17 NOTE — PROGRESS NOTE ADULT - ATTENDING COMMENTS
JEAN PIERRE unclear cause --   acute change in MS -- holding on HD as lytes and volume are ok   getting assessed by neuro-- to get head CT/ EEG  cont monitor for possible renal recovery--   likely HD monday   possible renal bx early next week

## 2020-10-18 NOTE — PROGRESS NOTE ADULT - ATTENDING COMMENTS
patient examined by resident this morning - however prior to my exam, patient began having seizure activity and rapid response called  patient had been on vEEG after change in mentation yesterday with negative head CT and rhythmic shaking that concerned for seizure - was not having repetitive movements today prior to seizure activity    patient given ativan and intubated for airway protected    briefly,  prior to yesterday's change in examination, patient was being worked up for uric acid elevation requiring HD upon admission  had been pan-scanned for infectious vs underlying malignancy   patient had intermittent nausea but otherwise was mentating well, a&ox3, at times agitated but no other seizure activity or change in mentation noted prior to yesterday's change and today's event.    patient transferred to ICU care.

## 2020-10-18 NOTE — PROCEDURE NOTE - NSICDXPROCEDURE_GEN_ALL_CORE_FT
PROCEDURES:  Ultrasound guided venous access 18-Oct-2020 19:27:52  James Morton  Insertion, needle, vein 18-Oct-2020 19:27:43  James Morton  
PROCEDURES:  Insertion of central venous catheter with ultrasound guidance 18-Oct-2020 17:23:42  James Morton  Insertion, needle, vein 18-Oct-2020 17:23:21  James Morton  Ultrasound guided venous access 18-Oct-2020 17:23:06  James Morton

## 2020-10-18 NOTE — PROVIDER CONTACT NOTE (CHANGE IN STATUS NOTIFICATION) - SITUATION
Upon removal and exchange of davis, pt became unresponsive to name and showed seizure like activity. Rapid response called and team at bedside.

## 2020-10-18 NOTE — PROGRESS NOTE ADULT - ASSESSMENT
39M with PMHx of chronic hepatitis C (untreated), anxiety, polysubstance abuse (K2, IV Heroin, last used 1 week prior to admission, utox also with cocaine and benzos), methadone dependence, spinal cord injury c/b b/l BKA 8-10 years ago, undomiciled recently incarcerated 6 months ago, presented on 10/12 with nausea and decreased PO intake x 2 months, weight loss of 50 lbs, and worsening generalized fatigue x 2 weeks. Admitted for acute renal failure, uremia and hyperkalemia. S/p hemodialysis and pending further workup for nephropathy. Course c/b C. diff infection and seizure.      NEURO:  #Altered mental status, Acute worsening of mental status 10/17. Baseline A&Ox2, better with family at bedside, now A&Ox0 only withdrawing to painful stimuli. CT head negative on 10/17. EEG placed morning of 10/18, positive for tonic clonic seizure at 9am. S/p intubation 10/18 for airway protection in setting of seizure and transfer to MICU  - Undergoing workup for underlying cause of seizure including worsening infection  - LP if no improvement  - Started Valproic acid per Neurology  - F/u EEG  - F/u Neurology.       PULM:      CARDIO:      GI:      ENDO:      RENAL:      ID:      HEME:      ACCESS/LINES:  (IVs)      PREVENTATIVE:  F: NONE  E: Replete PRN  N: Diet, NPO except meds  DVT PPx: Lovenox 40 mg q24  GI PPx: None  DISPO: MICU    FULL CODE           Problem/Plan - 1:  ·  Problem:      Problem/Plan - 2:  ·  Problem: JEAN PIERRE (acute kidney injury).  Plan: Presented with decreased urine output over last few days to weeks with elevated  and Cr 9.9, unknown baseline. N/V of several weeks without associated abdominal pain most likely 2/2 uremia. S/p initial HD session on 10/13. Possibly 2/2 underlying malignancy vs nephropathy 2/2 hepatitis C based on hematological workup thus far.  -renal ultrasound showed Mild right calyectasis without dilatation of the renal pelvis and medical renal disease.   - urine lytes revealing post-obstructive JEAN PIERRE  - associated hyperkalemia to 5.4 and hyperuricemia and hyperphosphatemia on admission with EKG showing peaked T waves isolate in V4. Now resolved s/p HD and s/p Rasburicase 6mg x1 on 10/14.  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      Problem/Plan - 3:  ·  Problem: Cellulitis.  Plan: Presenting with LLE cellulitis 2/2 ulceration. s/p bilateral BKA RLE w/ probable chronic limb ischemia and LLE concerning for cellulitis  - LE dopplers r/o DVT showed no DVT   - s/p vancomycin and zosyn in the ED   - wound care consulted  - C/w with CTX 2mg IVPB QD  - F/u repeat vancomycin levels in setting of intermittent HD    #UTI   -UA+  -C/w with CTX 2mg IVPB QD.      Problem/Plan - 4:  ·  Problem: Diarrhea.  Plan: -Patient with 2 months of diarrhea, denies antibiotic use  - improved but persistent since admission  - abdominal xray negative for obstruction causing N/V/D  -CT of abdomen/and RUQ unremarkable for any obvious malignancies contributing to presentation  -Stool PCR negative  -C. diff positive  - started on vanc 125mg PO q6h  - continue contact precautions.      Problem/Plan - 5:  ·  Problem: Elevated alkaline phosphatase level.  Plan: No history of gallbladder or biliary duct diagnoses. History of chronic HepC. No abdominal pain. Unclear cause of elevated Alk Phos at this time.  -in the setting of elevated GGT and Hep C, could be due to intrahepatic cholestasis  -Alk phos continues to be elevated, today 527  -Bilirubin level has been normal since admission  - RUQ US negative for biliary pathology, bile ducts not dilated, echogenic portal triads consistent with chronic hepC infection  - Sent multiple myeloma workup  - continue to trend Alkphos and monitor for any symptoms.      Problem/Plan - 6:  Problem: Hepatitis C. Plan: Patient Hep antibody +, hep B and HIV neg  -Elevated Alkphos, AST/ALT wnl   - elev   - continue to trend LFTs, management as per above.     Problem/Plan - 7:  ·  Problem: Polysubstance abuse.  Plan: Recent incarceration, endorses cocaine, heroin (5 days ago) and K2 use. Now on methadone 90mg qd. Utox on admission positive for opioids, cocaine, benzos and methadone   - confirmed methadone dosing 90mg qd - [phone], [place] - [phone]  -C/w methadone 90mg qd  - Avoid beta blockers  - HIV neg, positive Hep C antibody   - TB quant negative   - Monitor for signs of withdrawal, on CIWA protocol for now  - patient endorses IV drug use with no specific location of use, continue to treat patient with Vancomycin and Ceftriaxone for endocarditis prophylaxis in the setting of cellulitis as stated above.      Problem/Plan - 8:  ·  Problem: Anemia.  Plan: Normocytic anemia most likely 2/2 ACD vs ALEX in setting of renal dysfunction. No signs of acute blood loss.   - B12 elevated 1793 and Folate 13, iron studies consistent with mixed picture ALEX and ACD   - Transfuse Hg<7  - Maintain active Type and Screen.      Problem/Plan - 9:  ·  Problem: Elevated troponin.  Plan: Presented with troponemia in setting of ARF with no ischemic changes on EKG and elevated proBNP of 7428. Unlikely ACS, probably in setting of JEAN PIERRE.  -Trended Trop to peak  -TTE normal with EF 64%.      Problem/Plan - 10:  Problem: Nutrition, metabolism, and development symptoms. Plan; F: NS @ 60ml/hr  E: Replete electrolytes as needed, K>4, M>2  N: Renal Diet; additionally will obtain nutrition consult for malnutrition and cachetic appearance  A: Heparin Subq     FULL CODE  Dispo: [facility].   39M with PMHx of chronic hepatitis C (untreated), anxiety, polysubstance abuse (K2, IV Heroin, last used 1 week prior to admission, utox also with cocaine and benzos), methadone dependence, spinal cord injury c/b b/l BKA 8-10 years ago, undomiciled recently incarcerated 6 months ago, presented on 10/12 with nausea and decreased PO intake x 2 months, weight loss of 50 lbs, and worsening generalized fatigue x 2 weeks. Admitted for acute renal failure, uremia and hyperkalemia. S/p hemodialysis and pending further workup for nephropathy. Course c/b C. diff infection and seizure.      NEURO:  #Altered mental status, CT head negative on 10/17. EEG placed morning of 10/18, positive for tonic clonic seizure at 9am. S/p intubation 10/18 for airway protection in setting of seizure and transfer to MICU  - Propofol, versed, and fentanyl for sedation  - Undergoing workup for underlying cause of seizure including worsening infection  - LP if no improvement  - Started Valproic acid per Neurology  - F/u EEG  - F/u Neurology.       PULM:      CARDIO:  Elevated troponin.  Plan: Presented with troponemia in setting of ARF with no ischemic changes on EKG and elevated proBNP of 7428. Unlikely ACS, probably in setting of JEAN PIERRE.  -Trended Trop to peak  -TTE normal with EF 64%.     GI:  #C Diff positive, xray and CT negative for malignancy or obstruction  - c/w vanc 125mg PO q6h  - continue contact precautions.     #Elevated alkaline phosphatase level, i/s/o elevated GGT and chronic Hep C, possible intrahepatic cholestasis  - Alk phos continues to be elevated, today 527  - Bilirubin level has been normal since admission  - RUQ US negative for biliary pathology, bile ducts not dilated, echogenic portal triads consistent with chronic hepC infection  - Sent multiple myeloma workup  - continue to trend Alkphos and monitor for any symptoms.     # Chronic Hepatitis C, with hep B and HIV neg, RUQ US shows echogenic portal triads  - Elevated Alkphos, AST/ALT wnl   - elev   - continue to trend LFTs, management as per above.    ENDO:  Last A1c 5.5, on FS q6 with sliding scale while NPO in ICU, pending NGT placement    RENAL:  JEAN PIERRE (acute kidney injury).  Plan: Presented with decreased urine output over last few days to weeks with elevated  and Cr 9.9, unknown baseline. N/V of several weeks without associated abdominal pain most likely 2/2 uremia. S/p initial HD session on 10/13. Possibly 2/2 underlying malignancy vs nephropathy 2/2 hepatitis C based on hematological workup thus far.  -renal ultrasound showed Mild right calyectasis without dilatation of the renal pelvis and medical renal disease.   - urine lytes revealing post-obstructive JEAN PIERRE  - associated hyperkalemia to 5.4 and hyperuricemia and hyperphosphatemia on admission with EKG showing peaked T waves isolate in V4. Now resolved s/p HD and s/p Rasburicase 6mg x1 on 10/14.  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     ID:  Cellulitis.  Plan: Presenting with LLE cellulitis 2/2 ulceration. s/p bilateral BKA RLE w/ probable chronic limb ischemia and LLE concerning for cellulitis  - LE dopplers r/o DVT showed no DVT   - s/p vancomycin and zosyn in the ED   - wound care consulted  - C/w with CTX 2mg IVPB QD  - F/u repeat vancomycin levels in setting of intermittent HD    #UTI   -UA+  -C/w with CTX 2mg IVPB QD.         HEME:  #Normocytic anemia most likely 2/2 ACD vs ALEX in setting of renal dysfunction. No signs of acute blood loss.   - B12 elevated 1793 and Folate 13, iron studies consistent with mixed picture ALEX and ACD   - Transfuse Hg<7  - Maintain active Type and Screen.     OTHER:  #Pubic Lice found on admission to MICU, EEG removed for treatment as well  -permethrin 5% cream to all hair    #Polysubstance abuse with recent incarceration, endorses cocaine, heroin (5 days ago) and K2 use. Now on methadone 90mg qd.   - Utox on admission positive for opioids, cocaine, benzos and methadone   - confirmed methadone dosing 90mg qd - [phone] [facility] [address], [place] - [id number]  - C/w methadone 90mg qd  - Avoid beta blockers  - HIV neg, positive Hep C antibody   - TB quant negative   - Monitor for signs of withdrawal, on CIWA protocol for now  - patient endorses IV drug use with no specific location of use, continue to treat patient with Vancomycin and Ceftriaxone for endocarditis prophylaxis in the setting of cellulitis as stated above.     ACCESS/LINES:  2 Peripheral IV lines,       PREVENTATIVE:  F: NS  E: Replete PRN  N: NGT  DVT PPx: Heparin 5000 SubQ Q12  GI PPx: None  DISPO: MICU    FULL CODE       39M with PMHx of chronic hepatitis C (untreated), anxiety, polysubstance abuse (K2, IV Heroin, last used 1 week prior to admission, utox also with cocaine and benzos), methadone dependence, spinal cord injury c/b b/l BKA 8-10 years ago, undomiciled recently incarcerated 6 months ago, presented on 10/12 with nausea and decreased PO intake x 2 months, weight loss of 50 lbs, and worsening generalized fatigue x 2 weeks. Admitted for acute renal failure, uremia and hyperkalemia. S/p hemodialysis and pending further workup for nephropathy. Course c/b C. diff infection and seizure.      NEURO:  #Altered mental status, CT head negative on 10/17. EEG placed morning of 10/18, positive for tonic clonic seizure at 9am. S/p intubation 10/18 for airway protection in setting of seizure and transfer to MICU  - Propofol, versed, and fentanyl for sedation with intubation  - Undergoing workup for underlying cause of seizure including worsening infection  - LP if no improvement  - Valproate 500 IV Q12 for seizure PPX  - EEG removed for lice treatment, will be replaced after  - F/u Neurology.     PULM:  Patient intubated for airway protection  - sedated with propofol, versed, and fentanyl    CARDIO:  Elevated troponin i/s/o ARF with no ischemic changes on EKG and elevated proBNP of 7428. Likely demand ischemia  -Trops measured twice at 0.23 but not trended, f/u trop with AM lab for resolution  -TTE normal with EF 64%.  -levophed for BP support while sedated with goal MAP >65     GI:  #C Diff positive, xray and CT negative for malignancy or obstruction  - c/w vanc 125mg PO q6h  - continue contact precautions.     #Elevated alkaline phosphatase level, i/s/o elevated GGT and chronic Hep C, possible intrahepatic cholestasis  - Alk phos continues to be elevated, today 527  - Bilirubin level has been normal since admission  - RUQ US negative for biliary pathology, bile ducts not dilated, echogenic portal triads consistent with chronic hepC infection  - Sent multiple myeloma workup  - continue to trend Alkphos and monitor for any symptoms.     # Chronic Hepatitis C, with hep B and HIV neg, RUQ US shows echogenic portal triads  - Elevated Alkphos, AST/ALT wnl   - elev   - continue to trend LFTs, management as per above.    ENDO:  Last A1c 5.5, on FS q6 with sliding scale while NPO in ICU, pending NGT placement    RENAL:  JEAN PIERRE with associated hyperkalemia to 5.4 and hyperuricemia and hyperphosphatemia on admission with EKG showing peaked T waves isolate in V4. Now resolved s/p HD and s/p Rasburicase 6mg x1 on 10/14. Urine lytes consistent with post-obstructive JEAN PIERRE, biclonal gammopathy present, and renal US showed Mild right calyectasis without dilatation of the renal pelvis and medical renal disease.   - Nephro following, unclear etiology, will follow recs  - No need for HD today  - Trend Cr, Uric Acid, Phosphate  - reglan PRN nausea, vomiting  - Lead level in AM  - Repeat UTox    ID:  LLE cellulitis 2/2 ulceration. s/p bilateral BKA, RLE w/ probable chronic limb ischemia and LLE concerning for cellulitis  - LE dopplers r/o DVT   - s/p vancomycin and zosyn in the ED   - wound care consulted  - C/w Vanc    #UTI   -UA+  -C/w with CTX 2mg IVPB QD.         HEME:  #Normocytic anemia most likely 2/2 ACD vs ALEX in setting of renal dysfunction. No signs of acute blood loss.   - B12 elevated 1793 and Folate 13, iron studies consistent with mixed picture ALEX and ACD   - Transfuse Hg<7  - Maintain active Type and Screen.     OTHER:  #Pubic Lice found on admission to MICU, EEG removed for treatment as well  -permethrin 5% cream to all hair    #Polysubstance abuse with recent incarceration, endorses cocaine, heroin (5 days ago) and K2 use. Now on methadone 90mg qd.   - Utox on admission positive for opioids, cocaine, benzos and methadone   - confirmed methadone dosing 90mg qd - [phone] [facility] [address], [place] - [id number]  - C/w methadone 90mg qd  - Avoid beta blockers  - HIV neg, positive Hep C antibody   - TB quant negative   - Monitor for signs of withdrawal, on CIWA protocol for now  - patient endorses IV drug use with no specific location of use, continue to treat patient with Vancomycin and Ceftriaxone for endocarditis prophylaxis in the setting of cellulitis as stated above.     ACCESS/LINES:  2 Peripheral IV lines,       PREVENTATIVE:  F: NS  E: Replete PRN  N: NGT  DVT PPx: Heparin 5000 SubQ Q12  GI PPx: None  DISPO: MICU    FULL CODE           39M with PMHx of chronic hepatitis C (untreated), anxiety, polysubstance abuse (K2, IV Heroin, last used 1 week prior to admission, utox also with cocaine and benzos), methadone dependence, spinal cord injury c/b b/l BKA 8-10 years ago, undomiciled recently incarcerated 6 months ago, presented on 10/12 with nausea and decreased PO intake x 2 months, weight loss of 50 lbs, and worsening generalized fatigue x 2 weeks. Admitted for acute renal failure, uremia and hyperkalemia. S/p hemodialysis and pending further workup for nephropathy. Course c/b C. diff infection and seizure.      NEURO:  #Altered mental status, CT head negative on 10/17. EEG placed morning of 10/18, positive for tonic clonic seizure at 9am. S/p intubation 10/18 for airway protection in setting of seizure and transfer to MICU  - Propofol, versed, and fentanyl for sedation with intubation  - Undergoing workup for underlying cause of seizure including worsening infection  - LP if no improvement  - Valproate 500 IV Q12 for seizure PPX  - EEG removed for lice treatment, will be replaced after  - F/u Neurology.     PULM:  Patient intubated for airway protection  - sedated with propofol, versed, and fentanyl    CARDIO:  Elevated troponin i/s/o ARF with no ischemic changes on EKG and elevated proBNP of 7428. Likely demand ischemia  -Trops measured twice at 0.23 but not trended, f/u trop with AM lab for resolution  -TTE normal with EF 64%.  -levophed for BP support while sedated with goal MAP >65     GI:  #C Diff positive, xray and CT negative for malignancy or obstruction  - c/w vanc 1000 mg IV, redose as needed  - continue contact precautions.     #Elevated alkaline phosphatase level, i/s/o elevated GGT and chronic Hep C, possible intrahepatic cholestasis  - Alk phos continues to be elevated, today 527  - Bilirubin level has been normal since admission  - RUQ US negative for biliary pathology, bile ducts not dilated, echogenic portal triads consistent with chronic hepC infection  - Sent multiple myeloma workup  - continue to trend Alkphos and monitor for any symptoms.     # Chronic Hepatitis C, with hep B and HIV neg, RUQ US shows echogenic portal triads  - Elevated Alkphos, AST/ALT wnl   - elev   - continue to trend LFTs, management as per above.    ENDO:  Last A1c 5.5, on FS q6 with sliding scale while NPO in ICU, pending NGT placement    RENAL:  JEAN PIERRE with associated hyperkalemia to 5.4 and hyperuricemia and hyperphosphatemia on admission with EKG showing peaked T waves isolate in V4. Now resolved s/p HD and s/p Rasburicase 6mg x1 on 10/14. Urine lytes consistent with post-obstructive JEAN PIERRE, biclonal gammopathy present, and renal US showed Mild right calyectasis without dilatation of the renal pelvis and medical renal disease.   - Nephro following, unclear etiology, will follow recs  - No need for HD today  - Trend Cr, Uric Acid, Phosphate  - reglan PRN nausea, vomiting  - Lead level in AM  - Repeat UTox    ID:  LLE cellulitis 2/2 ulceration. s/p bilateral BKA, RLE w/ probable chronic limb ischemia and LLE concerning for cellulitis  - LE dopplers r/o DVT   - s/p vancomycin and zosyn in the ED   - wound care consulted  - C/w Vanc 1000 mg IV    #UTI   - UA+ but no UCx resulted  - F/U UCx    HEME:  #Normocytic anemia most likely 2/2 ACD vs ALEX in setting of renal dysfunction. No signs of acute blood loss.   - B12 elevated 1793 and Folate 13, iron studies consistent with mixed picture ALEX and ACD   - Transfuse Hg<7  - Maintain active Type and Screen.     OTHER:  #Lice found on head and pubic hair on admission to MICU, EEG removed for treatment  -permethrin 5% cream to all hair    #Polysubstance abuse with recent incarceration, endorses cocaine, heroin (5 days ago) and K2 use. Now on methadone 90mg qd.   - Utox on admission positive for opioids, cocaine, benzos and methadone   - confirmed methadone dosing 90mg qd - [phone] [facility] [address], [place] - [phone]  - C/w methadone 90mg qd  - Avoid beta blockers  - HIV neg, positive Hep C antibody   - TB quant negative   - Monitor for signs of withdrawal, on CIWA protocol for now  - patient endorses IV drug use with no specific location of use, continue to treat patient with Vancomycin and Ceftriaxone for endocarditis prophylaxis in the setting of cellulitis as stated above.     ACCESS/LINES:  2 Peripheral IV lines, L sided Central line, A line      PREVENTATIVE:  F: Replete PRN  E: Replete PRN  N: NGT  DVT PPx: Heparin 5000 SubQ Q12  GI PPx: None  DISPO: MICU    FULL CODE           39M with PMHx of chronic hepatitis C (untreated), anxiety, polysubstance abuse (K2, IV Heroin, last used 1 week prior to admission, utox also with cocaine and benzos), methadone dependence, spinal cord injury c/b b/l BKA 8-10 years ago, undomiciled recently incarcerated 6 months ago, presented on 10/12 with nausea and decreased PO intake x 2 months, weight loss of 50 lbs, and worsening generalized fatigue x 2 weeks. Admitted for acute renal failure, uremia and hyperkalemia. S/p hemodialysis and pending further workup for nephropathy. Course c/b C. diff infection and seizure.      NEURO:  #Altered mental status, CT head negative on 10/17. EEG placed morning of 10/18, positive for tonic clonic seizure at 9am. S/p intubation 10/18 for airway protection in setting of seizure and transfer to MICU  - Propofol, versed, and fentanyl for sedation with intubation  - Undergoing workup for underlying cause of seizure including worsening infection  - LP if no improvement  - Valproate 500 IV Q12 for seizure PPX  - EEG removed for lice treatment, will be replaced after  - F/u Neurology.     PULM:  Patient intubated for airway protection  - sedated with propofol, versed, and fentanyl    CARDIO:  Elevated troponin i/s/o ARF with no ischemic changes on EKG and elevated proBNP of 7428. Likely demand ischemia  -Trops measured twice at 0.23 but not trended, f/u trop with AM lab for resolution  -TTE normal with EF 64%.  -levophed for BP support while sedated with goal MAP >65     GI:  #C Diff positive, xray and CT negative for malignancy or obstruction  - c/w vanc 125 mg Q6  - continue contact precautions.     #Elevated alkaline phosphatase level, i/s/o elevated GGT and chronic Hep C, possible intrahepatic cholestasis  - Alk phos continues to be elevated, today 527  - Bilirubin level has been normal since admission  - RUQ US negative for biliary pathology, bile ducts not dilated, echogenic portal triads consistent with chronic hepC infection  - Sent multiple myeloma workup  - continue to trend Alkphos and monitor for any symptoms.     # Chronic Hepatitis C, with hep B and HIV neg, RUQ US shows echogenic portal triads  - Elevated Alkphos, AST/ALT wnl   - elev   - continue to trend LFTs, management as per above.    ENDO:  Last A1c 5.5, on FS q6 with sliding scale while NPO in ICU, pending NGT placement    RENAL:  JEAN PIERRE with associated hyperkalemia to 5.4 and hyperuricemia and hyperphosphatemia on admission with EKG showing peaked T waves isolate in V4. Now resolved s/p HD and s/p Rasburicase 6mg x1 on 10/14. Urine lytes consistent with post-obstructive JEAN PIERRE, biclonal gammopathy present, and renal US showed Mild right calyectasis without dilatation of the renal pelvis and medical renal disease.   - Nephro following, unclear etiology, will follow recs  - No need for HD today  - Trend Cr, Uric Acid, Phosphate  - reglan PRN nausea, vomiting  - Lead level in AM  - Repeat UTox    ID:  LLE cellulitis 2/2 ulceration. s/p bilateral BKA, RLE w/ probable chronic limb ischemia and LLE concerning for cellulitis  - LE dopplers r/o DVT   - s/p vancomycin and zosyn in the ED, plus Vanc 1000 mg IV one time 10/18   - wound care consulted  - F/U recs for ABx    #UTI   - UA+ but no UCx resulted  - F/U UCx    HEME:  #Normocytic anemia most likely 2/2 ACD vs ALEX in setting of renal dysfunction. No signs of acute blood loss.   - B12 elevated 1793 and Folate 13, iron studies consistent with mixed picture ALEX and ACD   - Transfuse Hg<7  - Maintain active Type and Screen.     OTHER:  #Lice found on head and pubic hair on admission to MICU, EEG removed for treatment  -permethrin 5% cream to all hair    #Polysubstance abuse with recent incarceration, endorses cocaine, heroin (5 days ago) and K2 use. Now on methadone 90mg qd.   - Utox on admission positive for opioids, cocaine, benzos and methadone   - confirmed methadone dosing 90mg qd - [phone] [facility] [address], [place] - [phone]  - C/w methadone 90mg qd  - Avoid beta blockers  - HIV neg, positive Hep C antibody   - TB quant negative   - Monitor for signs of withdrawal, on CIWA protocol for now  - patient endorses IV drug use with no specific location of use, continue to treat patient with Vancomycin and Ceftriaxone for endocarditis prophylaxis in the setting of cellulitis as stated above.     ACCESS/LINES:  2 Peripheral IV lines, L sided Central line, A line      PREVENTATIVE:  F: Replete PRN  E: Replete PRN  N: NGT  DVT PPx: Heparin 5000 SubQ Q12  GI PPx: None  DISPO: MICU    FULL CODE

## 2020-10-18 NOTE — PROGRESS NOTE ADULT - PROBLEM SELECTOR PLAN 10
F: NS @ 60ml/hr  E: Replete electrolytes as needed, K>4, M>2  N: Renal Diet; additionally will obtain nutrition consult for malnutrition and cachetic appearance  A: Heparin Subq     FULL CODE  Dispo: JAKOB

## 2020-10-18 NOTE — PROGRESS NOTE ADULT - ATTENDING COMMENTS
Seen in ICU, Sedated, chest clear, cor rr, ab soft nontender, ext amputations.  Labs as above.  Etiology  of seizures unclear. To maintain on vent tonight, video EEG, dialysis per renal, consider spinal tap in am - to discuss with neuro.  hold DVT prophylaxis.  Other plans as outlined above.  CCM time 35 min

## 2020-10-18 NOTE — PROVIDER CONTACT NOTE (CHANGE IN STATUS NOTIFICATION) - ASSESSMENT
Pt alert but unresponsive to name and unable to answer questions about orientation. Pt had twitching of BL upper extremities, nystagmus and dilated pupils, and began to foam at mouth. Pt turned to side and pillows removed. Video EEG in place. BL wrist restraints maintained for agitation. /95, , RR 24, o2 sat 99%, temp 100.7 F rectal.     Pt saturation continued to decline, consistently at 72% on nonrebreather.

## 2020-10-18 NOTE — PROVIDER CONTACT NOTE (CHANGE IN STATUS NOTIFICATION) - BACKGROUND
Pt has hx of heroin abuse currently on methadone, spinal cord injury, BL BKA, presents to hospital with acute renal failure and uremia requiring dialysis. Also being treated for cellulitis in BL BKA.

## 2020-10-18 NOTE — PROGRESS NOTE ADULT - SUBJECTIVE AND OBJECTIVE BOX
**********TRANSFER ACCEPTANCE TO MICU FROM [facility]******  HOSPITAL COURSE:  39M with PMHx of chronic hepatitis C (untreated), anxiety, polysubstance abuse (K2, IV Heroin, last used 1 week prior to admission, utox also with cocaine and benzos), methadone dependence, spinal cord injury c/b b/l BKA 8-10 years ago, undomiciled recently incarcerated 6 months ago, presented on 10/12 with nausea and decreased PO intake x 2 months, weight loss of 50 lbs, and worsening generalized fatigue x 2 weeks. Initially admitted to [facility] for acute renal failure requiring urgent HD for hyperkalemia to 6 and uremia to 190s. HD cath placed and HD initiated on 10/13. Pt was also started on Vancomycin and CFTX for b/l leg cellulitis, then transferred to [facility] on 10/13. Nephrology raised concern for possible tumor lysis due to elevated uric acid level to 24.9, now resolved s/p rasburicase on 10/14. Malignancy workup negative for mass on CT chest/abd/pelvis but positive for biclonal gammopathy for which Heme/Onc is following. C. diff positive on 10/16, started on PO vancomycin. Pt had acute worsening of mental status 10/17, newly AAOx0 only withdrawing to pain with intermittent RUE twitching. CT head 10/17 negative. EEG placed morning of 10/18. Rapid response called 10:40am 10/18 for witnessed seizure-like activity lasting >2 minutes, not resolving with total of 5mg IV Ativan, with fever, RR to 28 and intermittent hypoxia to 70s. Pt was intubated for airway protection in setting of seizure and transferred to the MICU.    Patient arrived to the MICU intubated and agitated. Central line, A line, and peripheral IVs established.      ICU Vital Signs Last 24 Hrs  T(C): 36.6 (18 Oct 2020 09:23), Max: 36.8 (17 Oct 2020 21:05)  T(F): 97.9 (18 Oct 2020 09:23), Max: 98.2 (17 Oct 2020 21:05)  HR: 114 (18 Oct 2020 13:00) (85 - 124)  BP: 97/62 (18 Oct 2020 13:00) (85/53 - 147/78)  BP(mean): 74 (18 Oct 2020 13:00) (65 - 94)  ABP: --  ABP(mean): --  RR: 8 (18 Oct 2020 13:00) (8 - 27)  SpO2: 100% (18 Oct 2020 13:00) (89% - 100%)    I&O's Summary    18 Oct 2020 07:01  -  18 Oct 2020 14:12  --------------------------------------------------------  IN: 57.4 mL / OUT: 0 mL / NET: 57.4 mL      Mode: AC/ CMV (Assist Control/ Continuous Mandatory Ventilation)  RR (machine): 15  TV (machine): 400  FiO2: 100  PEEP: 5  ITime: 1  MAP: 9.1  PIP: 15      LABS:                        8.4    7.00  )-----------( 296      ( 18 Oct 2020 11:06 )             28.9     10-18    138  |  91<L>  |  59<H>  ----------------------------<  292<H>  3.9   |  17<L>  |  8.25<H>    Ca    7.4<L>      18 Oct 2020 11:06  Phos  10.6     10-18  Mg     2.0     10-18    TPro  7.3  /  Alb  1.9<L>  /  TBili  0.3  /  DBili  x   /  AST  32  /  ALT  15  /  AlkPhos  438<H>  10-18        CAPILLARY BLOOD GLUCOSE      POCT Blood Glucose.: 89 mg/dL (18 Oct 2020 10:47)        RADIOLOGY & ADDITIONAL TESTS:    Consultant(s) Notes Reviewed:  [x ] YES  [ ] NO    MEDICATIONS  (STANDING):  acetaminophen  IVPB .. 1000 milliGRAM(s) IV Intermittent once  fentaNYL   Infusion. 0.5 MICROgram(s)/kG/Hr (2.27 mL/Hr) IV Continuous <Continuous>  heparin   Injectable 5000 Unit(s) SubCutaneous every 12 hours  influenza   Vaccine 0.5 milliLiter(s) IntraMuscular once  methadone    Tablet 90 milliGRAM(s) Oral daily  midazolam Infusion 0.02 mG/kG/Hr (0.91 mL/Hr) IV Continuous <Continuous>  Nephro-dianne 1 Tablet(s) Oral daily  norepinephrine Infusion 0.05 MICROgram(s)/kG/Min (4.26 mL/Hr) IV Continuous <Continuous>  propofol Infusion 10 MICROgram(s)/kG/Min (2.72 mL/Hr) IV Continuous <Continuous>  valproate sodium IVPB 500 milliGRAM(s) IV Intermittent every 12 hours  vancomycin    Solution 125 milliGRAM(s) Oral every 6 hours    MEDICATIONS  (PRN):  haloperidol    Injectable 5 milliGRAM(s) IntraMuscular once PRN agitation, combative with vEEG placement  LORazepam   Injectable 2 milliGRAM(s) IV Push every 2 hours PRN Symptom-triggered: each CIWA -Ar score 8 or GREATER  metoclopramide Injectable 5 milliGRAM(s) IV Push every 6 hours PRN Nausea, vomiting      PHYSICAL EXAM:  GENERAL:   HEAD:  Atraumatic, Normocephalic  EYES: EOMI, PERRLA, conjunctiva and sclera clear  NECK: Supple, No JVD, Normal thyroid, no enlarged nodes  NERVOUS SYSTEM:  Alert & Awake.   CHEST/LUNG: B/L good air entry; No rales, rhonchi, or wheezing  HEART: S1S2 normal, no S3, Regular rate and rhythm; No murmurs  ABDOMEN: Soft, Nontender, Nondistended; Bowel sounds present  EXTREMITIES:  2+ Peripheral Pulses, No clubbing, cyanosis, or edema  LYMPH: No lymphadenopathy noted  SKIN: No rashes or lesions    Care Discussed with Consultants/Other Providers [ x] YES  [ ] NO **********TRANSFER ACCEPTANCE TO MICU FROM [facility]******  HOSPITAL COURSE:  39M with PMHx of chronic hepatitis C (untreated), anxiety, polysubstance abuse (K2, IV Heroin, last used 1 week prior to admission, utox also with cocaine and benzos), methadone dependence, spinal cord injury c/b b/l BKA 8-10 years ago, undomiciled recently incarcerated 6 months ago, presented on 10/12 with nausea and decreased PO intake x 2 months, weight loss of 50 lbs, and worsening generalized fatigue x 2 weeks. Initially admitted to [facility] for acute renal failure requiring urgent HD for hyperkalemia to 6 and uremia to 190s. HD cath placed and HD initiated on 10/13. Pt was also started on Vancomycin and CFTX for b/l leg cellulitis, then transferred to [facility] on 10/13. Nephrology raised concern for possible tumor lysis due to elevated uric acid level to 24.9, now resolved s/p rasburicase on 10/14. Malignancy workup negative for mass on CT chest/abd/pelvis but positive for biclonal gammopathy for which Heme/Onc is following. C. diff positive on 10/16, started on PO vancomycin. Pt had acute worsening of mental status 10/17, newly AAOx0 only withdrawing to pain with intermittent RUE twitching. CT head 10/17 negative. EEG placed morning of 10/18. Rapid response called 10:40am 10/18 for witnessed seizure-like activity lasting >2 minutes, not resolving with total of 5mg IV Ativan, with fever, RR to 28 and intermittent hypoxia to 70s. Pt was intubated for airway protection in setting of seizure and transferred to the MICU.    Patient arrived to the MICU intubated and agitated. Central line, A line, and peripheral IVs established.      ICU Vital Signs Last 24 Hrs  T(C): 36.6 (18 Oct 2020 09:23), Max: 36.8 (17 Oct 2020 21:05)  T(F): 97.9 (18 Oct 2020 09:23), Max: 98.2 (17 Oct 2020 21:05)  HR: 114 (18 Oct 2020 13:00) (85 - 124)  BP: 97/62 (18 Oct 2020 13:00) (85/53 - 147/78)  BP(mean): 74 (18 Oct 2020 13:00) (65 - 94)  ABP: --  ABP(mean): --  RR: 8 (18 Oct 2020 13:00) (8 - 27)  SpO2: 100% (18 Oct 2020 13:00) (89% - 100%)    I&O's Summary    18 Oct 2020 07:01  -  18 Oct 2020 14:12  --------------------------------------------------------  IN: 57.4 mL / OUT: 0 mL / NET: 57.4 mL      Mode: AC/ CMV (Assist Control/ Continuous Mandatory Ventilation)  RR (machine): 15  TV (machine): 400  FiO2: 100  PEEP: 5  ITime: 1  MAP: 9.1  PIP: 15      LABS:                        8.4    7.00  )-----------( 296      ( 18 Oct 2020 11:06 )             28.9     10-18    138  |  91<L>  |  59<H>  ----------------------------<  292<H>  3.9   |  17<L>  |  8.25<H>    Ca    7.4<L>      18 Oct 2020 11:06  Phos  10.6     10-18  Mg     2.0     10-18    TPro  7.3  /  Alb  1.9<L>  /  TBili  0.3  /  DBili  x   /  AST  32  /  ALT  15  /  AlkPhos  438<H>  10-18        CAPILLARY BLOOD GLUCOSE      POCT Blood Glucose.: 89 mg/dL (18 Oct 2020 10:47)        RADIOLOGY & ADDITIONAL TESTS:    Consultant(s) Notes Reviewed:  [x ] YES  [ ] NO    MEDICATIONS  (STANDING):  acetaminophen  IVPB .. 1000 milliGRAM(s) IV Intermittent once  fentaNYL   Infusion. 0.5 MICROgram(s)/kG/Hr (2.27 mL/Hr) IV Continuous <Continuous>  heparin   Injectable 5000 Unit(s) SubCutaneous every 12 hours  influenza   Vaccine 0.5 milliLiter(s) IntraMuscular once  methadone    Tablet 90 milliGRAM(s) Oral daily  midazolam Infusion 0.02 mG/kG/Hr (0.91 mL/Hr) IV Continuous <Continuous>  Nephro-dianne 1 Tablet(s) Oral daily  norepinephrine Infusion 0.05 MICROgram(s)/kG/Min (4.26 mL/Hr) IV Continuous <Continuous>  propofol Infusion 10 MICROgram(s)/kG/Min (2.72 mL/Hr) IV Continuous <Continuous>  valproate sodium IVPB 500 milliGRAM(s) IV Intermittent every 12 hours  vancomycin    Solution 125 milliGRAM(s) Oral every 6 hours    MEDICATIONS  (PRN):  haloperidol    Injectable 5 milliGRAM(s) IntraMuscular once PRN agitation, combative with vEEG placement  LORazepam   Injectable 2 milliGRAM(s) IV Push every 2 hours PRN Symptom-triggered: each CIWA -Ar score 8 or GREATER  metoclopramide Injectable 5 milliGRAM(s) IV Push every 6 hours PRN Nausea, vomiting    PHYSICAL EXAM:  General: Cachectic man appearing older than stated age, writhing in bed  HEENT: EEG leads in place, dilated pupils 5mm bilaterally equal and reactive to light, no nystagmus  Neck: supple, no nuchal rigidity  Cardiovascular: +S1/S2, RRR, No murmurs, rubs, gallops  Respiratory: Decreased breath sounds in all lung fields 2/2 poor inspiratory effort, no W/R/R  Gastrointestinal: soft, nondistended, +BSx4  Extremities: Warm and well perfused, anasarca, b/l BKA  Vascular: 2+ radial pulses B/L,   Neurological: AAOx0, patient not participating in exam    Care Discussed with Consultants/Other Providers [ x] YES  [ ] NO   *******TRANSFER ACCEPTANCE TO MICU FROM [facility]******  HOSPITAL COURSE:  39M with PMHx of chronic hepatitis C (untreated), anxiety, polysubstance abuse (K2, IV Heroin, last used 1 week prior to admission, utox also with cocaine and benzos), methadone dependence, spinal cord injury c/b b/l BKA 8-10 years ago, undomiciled recently incarcerated 6 months ago, presented on 10/12 with nausea and decreased PO intake x 2 months, weight loss of 50 lbs, and worsening generalized fatigue x 2 weeks. Initially admitted to [facility] for acute renal failure requiring urgent HD for hyperkalemia to 6 and uremia to 190s. HD cath placed and HD initiated on 10/13. Pt was also started on Vancomycin and CFTX for b/l leg cellulitis, then transferred to [facility] on 10/13. Nephrology raised concern for possible tumor lysis due to elevated uric acid level to 24.9, now resolved s/p rasburicase on 10/14. Malignancy workup negative for mass on CT chest/abd/pelvis but positive for biclonal gammopathy for which Heme/Onc is following. C. diff positive on 10/16, started on PO vancomycin. Pt had acute worsening of mental status 10/17, newly AAOx0 only withdrawing to pain with intermittent RUE twitching. CT head 10/17 negative. EEG placed morning of 10/18. Rapid response called 10:40am 10/18 for witnessed seizure-like activity lasting >2 minutes, not resolving with total of 5mg IV Ativan, with fever, RR to 28 and intermittent hypoxia to 70s. Pt was intubated for airway protection in setting of seizure and transferred to the MICU.    Patient arrived to the MICU intubated and agitated. Central line, A line, and peripheral IVs established. EEG removed 2/2 lice being discovered. Patient sedated with propofol, fentanyl, and versed, with Levophed for support with MAP stable >65      ICU Vital Signs Last 24 Hrs  T(C): 36.6 (18 Oct 2020 09:23), Max: 36.8 (17 Oct 2020 21:05)  T(F): 97.9 (18 Oct 2020 09:23), Max: 98.2 (17 Oct 2020 21:05)  HR: 114 (18 Oct 2020 13:00) (85 - 124)  BP: 97/62 (18 Oct 2020 13:00) (85/53 - 147/78)  BP(mean): 74 (18 Oct 2020 13:00) (65 - 94)  ABP: --  ABP(mean): --  RR: 8 (18 Oct 2020 13:00) (8 - 27)  SpO2: 100% (18 Oct 2020 13:00) (89% - 100%)    I&O's Summary    18 Oct 2020 07:01  -  18 Oct 2020 14:12  --------------------------------------------------------  IN: 57.4 mL / OUT: 0 mL / NET: 57.4 mL      Mode: AC/ CMV (Assist Control/ Continuous Mandatory Ventilation)  RR (machine): 15  TV (machine): 400  FiO2: 100  PEEP: 5  ITime: 1  MAP: 9.1  PIP: 15      LABS:                        8.4    7.00  )-----------( 296      ( 18 Oct 2020 11:06 )             28.9     10-18    138  |  91<L>  |  59<H>  ----------------------------<  292<H>  3.9   |  17<L>  |  8.25<H>    Ca    7.4<L>      18 Oct 2020 11:06  Phos  10.6     10-18  Mg     2.0     10-18    TPro  7.3  /  Alb  1.9<L>  /  TBili  0.3  /  DBili  x   /  AST  32  /  ALT  15  /  AlkPhos  438<H>  10-18        CAPILLARY BLOOD GLUCOSE      POCT Blood Glucose.: 89 mg/dL (18 Oct 2020 10:47)        RADIOLOGY & ADDITIONAL TESTS:    Consultant(s) Notes Reviewed:  [x ] YES  [ ] NO    MEDICATIONS  (STANDING):  acetaminophen  IVPB .. 1000 milliGRAM(s) IV Intermittent once  fentaNYL   Infusion. 0.5 MICROgram(s)/kG/Hr (2.27 mL/Hr) IV Continuous <Continuous>  heparin   Injectable 5000 Unit(s) SubCutaneous every 12 hours  influenza   Vaccine 0.5 milliLiter(s) IntraMuscular once  methadone    Tablet 90 milliGRAM(s) Oral daily  midazolam Infusion 0.02 mG/kG/Hr (0.91 mL/Hr) IV Continuous <Continuous>  Nephro-dianne 1 Tablet(s) Oral daily  norepinephrine Infusion 0.05 MICROgram(s)/kG/Min (4.26 mL/Hr) IV Continuous <Continuous>  propofol Infusion 10 MICROgram(s)/kG/Min (2.72 mL/Hr) IV Continuous <Continuous>  valproate sodium IVPB 500 milliGRAM(s) IV Intermittent every 12 hours  vancomycin    Solution 125 milliGRAM(s) Oral every 6 hours    MEDICATIONS  (PRN):  haloperidol    Injectable 5 milliGRAM(s) IntraMuscular once PRN agitation, combative with vEEG placement  LORazepam   Injectable 2 milliGRAM(s) IV Push every 2 hours PRN Symptom-triggered: each CIWA -Ar score 8 or GREATER  metoclopramide Injectable 5 milliGRAM(s) IV Push every 6 hours PRN Nausea, vomiting    PHYSICAL EXAM:  General: Cachectic man appearing older than stated age, now sedated in bed  HEENT: EEG leads in place, dilated pupils 5mm bilaterally equal and reactive to light, no nystagmus  Neck: supple, no nuchal rigidity  Cardiovascular: +S1/S2, RRR, No murmurs, rubs, gallops  Respiratory: Decreased breath sounds in all lung fields 2/2 poor inspiratory effort, no W/R/R  Gastrointestinal: soft, nondistended, +BSx4  Extremities: Warm and well perfused, anasarca, b/l BKA  Vascular: 2+ radial pulses B/L   Neurological: EMILY, withdraws from pain, unable to assess remainder    Care Discussed with Consultants/Other Providers [ x] YES  [ ] NO

## 2020-10-18 NOTE — PROGRESS NOTE ADULT - PROBLEM SELECTOR PLAN 7
Recent incarceration, endorses cocaine, heroin (5 days ago) and K2 use. Now on methadone 90mg qd. Utox on admission positive for opioids, cocaine, benzos and methadone   - confirmed methadone dosing 90mg qd - [phone] [facility] [address], [place] - [id number]  -C/w methadone 90mg qd  - Avoid beta blockers  - HIV neg, positive Hep C antibody   - TB quant negative   - Monitor for signs of withdrawal, on CIWA protocol for now  - patient endorses IV drug use with no specific location of use, continue to treat patient with Vancomycin and Ceftriaxone for endocarditis prophylaxis in the setting of cellulitis as stated above

## 2020-10-18 NOTE — PROGRESS NOTE ADULT - PROBLEM SELECTOR PLAN 5
No history of gallbladder or biliary duct diagnoses. History of chronic HepC. No abdominal pain. Unclear cause of elevated Alk Phos at this time.  -in the setting of elevated GGT and Hep C, could be due to intrahepatic cholestasis  -Alk phos continues to be elevated, today 527  -Bilirubin level has been normal since admission  - RUQ US negative for biliary pathology, bile ducts not dilated, echogenic portal triads consistent with chronic hepC infection  - Sent multiple myeloma workup  - continue to trend Alkphos and monitor for any symptoms

## 2020-10-18 NOTE — CHART NOTE - NSCHARTNOTEFT_GEN_A_CORE
Pt AOX0, very uncomfortable w/ frequent loud moans throughout the night (unable to communicate if in pain) and keeps banging his head against the rails of the bed and spiting at nurses. Pt refused to open mouth, so have not been getting any oral meds (methadone, vanc PO). Pt AOX0, very uncomfortable w/ frequent loud moans throughout the night (unable to communicate if in pain or discomfort)> Pt kept banging his head against the rails of the bed and spiting at nurses. Pt refused to open mouth, so have not been getting any oral meds (methadone, vanc PO) today. Pt did not get vEEG performed, person performing it said he was spitting at her and moving his head making it impossible for her to place the leads. Not sure if pt has been eating or drinking as he refuses to open his mouth for PO meds. Pt AOX0, very uncomfortable w/ frequent loud moans throughout the night (unable to communicate if in pain or discomfort)> Pt kept banging his head against the rails of the bed and spiting at nurses. Pt refused to open mouth, so have not been getting any oral meds (methadone, vanc PO) today. Pt did not get vEEG performed, person performing it said he was spitting at her and moving his head making it impossible for her to place the leads. Not sure if pt has been eating or drinking as he refuses to open his mouth for PO meds. Pt has been tachycardic to 110s overnight, could be 2/2 withdrawal (did not receive methadone today) vs pain (pt not able to communicate)  2/2 AOX0 Pt AOX0, very uncomfortable w/ frequent loud moans throughout the night (unable to communicate if in pain or discomfort)> Pt kept banging his head against the rails of the bed and spiting at nurses. Pt refused to open mouth, so have not been getting any oral meds (methadone, vanc PO) today. Pt did not get vEEG performed, person performing it said he was spitting at her and moving his head making it impossible for her to place the leads. Not sure if pt has been eating or drinking as he refused to open his mouth for PO meds starting later in the afternoon and overnight.     Pt has been tachycardic to 110s overnight. Afebrile Rectal temp 98.6. Pt has dilated  pupils w/ nystagmus, Tachy could be 2/2 opioid withdrawal (did not receive methadone today, last dose of methadone at 12 pm on 10/17) vs pain (pt not able to communicate and hard to interpret PE of abdomen and pelvis 2/2 AOX0). gave IV Ativene X1. Pt AOX0, very uncomfortable w/ frequent loud moans throughout the night (unable to communicate if in pain or discomfort). Pt kept banging his head against the rails of the bed and slamming his body from side to side. Pt refused to open mouth, so have not been getting any oral meds (methadone, vanc PO) today. Pt did not get vEEG performed, person performing it said he was spitting at her and moving his head making it impossible for her to place the leads despite being on Haldol 5 mg. Not sure if pt has been eating or drinking as he refused to open his mouth for PO meds starting later in the afternoon and overnight.     Pt has been tachycardic to 110-120s overnight. Afebrile Rectal temp 98.6. Pt has dilated pupils w/ nystagmus. Tachycardia could be 2/2 opioid withdrawal (did not receive methadone today, last dose of methadone at 12 pm on 10/17) vs pain (pt not able to communicate and hard to interpret PE of abdomen and pelvis 2/2 AOX0). Gave IV Ativene X1. Pt AOX0, very uncomfortable w/ frequent loud moans throughout the night (unable to communicate if in pain or discomfort). Pt kept banging his head against the rails of the bed and slamming his body from side to side. Pt refused to open mouth, so have not been getting any oral meds (methadone, vanc PO) today. Pt did not get vEEG performed, person performing it said he was spitting at her and moving his head making it impossible for her to place the leads despite being on Haldol 5 mg. Not sure if pt has been eating or drinking as he refused to open his mouth for PO meds starting later in the afternoon and overnight.     Pt has been tachycardic to 110-120s overnight. Afebrile Rectal temp 98.6. Pt has dilated pupils w/ nystagmus. Tachycardia could be 2/2 opioid withdrawal (did not receive methadone today, last dose of methadone at 12 pm on 10/17) vs pain (pt not able to communicate and hard to interpret PE of abdomen and pelvis 2/2 AOX0). Gave IV Ativene X1. EKG ordered Pt AOX0, very uncomfortable w/ frequent loud moans throughout the night (unable to communicate if in pain/ discomfort). Pt kept banging his head against the rails of the bed and slamming his body from side to side. Pt refused to open mouth, so have not been getting any oral meds (methadone, vanc PO) today since afternoon-overnight per med chart records. Pt did not get vEEG performed, person performing it said he was spitting at her and moving his head making it impossible for her to place the leads despite being on Haldol 5 mg, nurse was in the room to provide needed assistance. Person in charge of EEG stated that she will document refusal of the procedure and that she will inform Dr. Zavala. Not sure if pt has been eating or drinking as he refuses to open his mouth for PO meds and AOX0. Recommend trying to track % of meals consumed during the day.     Pt has been tachycardic to 110s overnight. Afebrile Rectal temp 98.6. Pt has dilated pupils w/ nystagmus. Tachycardia could be 2/2 opioid withdrawal (did not receive methadone today, last dose of methadone at 12 pm on 10/17) vs pain (pt not able to communicate and hard to interpret PE of abdomen and pelvis 2/2 AOX0). Gave IV Ativene X1. EKG ordered. Ordered IV Methadone 10 mg, but since pt was more calm and feel asleep after IV Ativene decided not to administer. Pt was evaluated by attending Dr. Morse as well as resident doctors Dr. Lentz and Dr. Neal. Pt AOX0, very uncomfortable w/ frequent loud moans throughout the night (unable to communicate if in pain/ discomfort). Pt kept banging his head against the rails of the bed and slamming his body from side to side. Pt refused to open mouth, so have not been getting any oral meds (methadone, vanc PO) today since afternoon-overnight per med chart records. Pt did not get vEEG performed, person performing it said he was spitting at her and moving his head making it impossible for her to place the leads despite being on Haldol 5 mg, nurse was in the room to provide needed assistance. Person in charge of EEG stated that she will document refusal of the procedure and that she will inform Dr. Zavala. Not sure if pt has been eating or drinking as he refuses to open his mouth for PO meds and AOX0. Recommend trying to track % of meals consumed during the day.     Pt has been tachycardic to 110s overnight. Afebrile Rectal temp 98.6. Pt has dilated pupils w/ nystagmus. Tachycardia could be 2/2 opioid withdrawal (did not receive methadone today, last dose of methadone at 12 pm on 10/17) vs pain (pt not able to communicate and hard to interpret PE of abdomen and pelvis 2/2 AOX0).Pt's IV was not working, after IV access was established, IV Ativene X1 given. EKG ordered. Ordered IV Methadone 10 mg, but since pt was more calm and feel asleep after IV Ativene decided not to administer. Pt was evaluated by attending Dr. Morse as well as resident doctors Dr. Lentz and Dr. Neal. Pt AOX0, very uncomfortable w/ frequent loud moans throughout the night (unable to communicate if in pain/ discomfort). Pt kept banging his head against the rails of the bed and slamming his body from side to side. Pt refused to open mouth, so have not been getting any oral meds (methadone, vanc PO) today since afternoon-overnight per med chart records. Pt did not get vEEG performed, person performing it said he was spitting at her and moving his head making it impossible for her to place the leads despite being on Haldol 5 mg, nurse was in the room to provide needed assistance. Person in charge of EEG stated that she will document refusal of the procedure and that she will inform Dr. Zavala. Not sure if pt has been eating or drinking as he refuses to open his mouth for PO meds and AOX0. Recommend trying to track % of meals consumed during the day.     Pt has been tachycardic to 110s overnight. Afebrile Rectal temp 98.6. Pt has dilated pupils w/ nystagmus. Tachycardia could be 2/2 opioid withdrawal (did not receive methadone today, last dose of methadone at 12 pm on 10/17) vs pain (pt not able to communicate and hard to interpret PE of abdomen and pelvis 2/2 AOX0).Pt's IV was not working, after IV access was established, IV Ativene X1 given. EKG ordered. Ordered IV Methadone 10 mg, but since pt was more calm and feel asleep after IV Ativene decided not to administer. Pt was evaluated by attending Dr. Morse as well as resident doctor Dr. Blanco

## 2020-10-18 NOTE — PROGRESS NOTE ADULT - SUBJECTIVE AND OBJECTIVE BOX
Interval history:     Location:  Quality:  Severity:  Duration:  Timing:  Context:  Modifying Factors:  Associated signs and symptoms:    Data reviewed:  Labs:  Imaging (reports)  Imaging (independently reviewed)  Prior records:  Other:    Other problems:    ROS:   +   +   -    PAST MEDICAL & SURGICAL HISTORY:  Drug abuse  K2    Heroin abuse    Above-knee amputation of right lower extremity    Above-knee amputation of left lower extremity        FAMILY HISTORY:      Social History:     Allergies    No Known Allergies    Intolerances        MEDICATIONS  (STANDING):  fentaNYL   Infusion. 0.5 MICROgram(s)/kG/Hr (2.27 mL/Hr) IV Continuous <Continuous>  heparin   Injectable 5000 Unit(s) SubCutaneous every 12 hours  influenza   Vaccine 0.5 milliLiter(s) IntraMuscular once  methadone    Tablet 90 milliGRAM(s) Oral daily  midazolam Infusion 0.02 mG/kG/Hr (0.91 mL/Hr) IV Continuous <Continuous>  Nephro-dianne 1 Tablet(s) Oral daily  norepinephrine Infusion 0.05 MICROgram(s)/kG/Min (4.26 mL/Hr) IV Continuous <Continuous>  propofol Infusion 10 MICROgram(s)/kG/Min (2.72 mL/Hr) IV Continuous <Continuous>  valproate sodium IVPB 500 milliGRAM(s) IV Intermittent every 12 hours  vancomycin    Solution 125 milliGRAM(s) Oral every 6 hours      Vital Signs Last 24 Hrs  T(C): 36.6 (18 Oct 2020 09:23), Max: 36.8 (17 Oct 2020 21:05)  T(F): 97.9 (18 Oct 2020 09:23), Max: 98.2 (17 Oct 2020 21:05)  HR: 114 (18 Oct 2020 13:00) (85 - 124)  BP: 97/62 (18 Oct 2020 13:00) (85/53 - 147/78)  BP(mean): 74 (18 Oct 2020 13:00) (65 - 94)  RR: 8 (18 Oct 2020 13:00) (8 - 27)  SpO2: 100% (18 Oct 2020 13:00) (89% - 100%)    Exam:  Gen: appears well, well-nourished, no acute distress    MS: awake, alert, speech fluent, comprehension intact, follows commands    CN: PERRL, EOMI, visual fields full, facial strength and sensation intact and symmetric, palate elevation symmetric, tongue midline, no tongue atrophy or fasciculations    Motor: normal bulk and tone, 5/5 strength throughout, no abnormal movements    Sensory: light touch intact and symmetric throughout    Reflexes: 2+ symmetric throughout, no Krueger’s sign, plantar responses flexor bilaterally    Coordination: no dysmetria on finger to nose, Romberg negative    Gait: normal, can tandem without difficulty Interval history: Unable to place EEG last night due to agitation  This morning EEG placed, and at around 10:40am there was eye deviation and nystagmus, left upper limb shaking  He was given ativan 1mg, then rapid response called, found to be tachypneic and hypoxic, another 4mg ativan given total, intubated and transferred to MICU   EEG showed seizure at this time as well as at around 9am  Prior to that had generalized periodic discharges     Data reviewed:  Labs:  10-18    138  |  91<L>  |  59<H>  ----------------------------<  292<H>  3.9   |  17<L>  |  8.25<H>    Ca    7.4<L>      18 Oct 2020 11:06  Phos  10.6     10-18  Mg     2.0     10-18    TPro  7.3  /  Alb  1.9<L>  /  TBili  0.3  /  DBili  x   /  AST  32  /  ALT  15  /  AlkPhos  438<H>  10-18                        8.4    7.00  )-----------( 296      ( 18 Oct 2020 11:06 )             28.9     Imaging (independently reviewed): CT head 10/17/20 unremarkable     ROS: unable to assess    PAST MEDICAL & SURGICAL HISTORY:  Drug abuse  K2    Heroin abuse    Above-knee amputation of right lower extremity    Above-knee amputation of left lower extremity        FAMILY HISTORY:      Social History:     Allergies    No Known Allergies    Intolerances        MEDICATIONS  (STANDING):  fentaNYL   Infusion. 0.5 MICROgram(s)/kG/Hr (2.27 mL/Hr) IV Continuous <Continuous>  heparin   Injectable 5000 Unit(s) SubCutaneous every 12 hours  influenza   Vaccine 0.5 milliLiter(s) IntraMuscular once  methadone    Tablet 90 milliGRAM(s) Oral daily  midazolam Infusion 0.02 mG/kG/Hr (0.91 mL/Hr) IV Continuous <Continuous>  Nephro-dianne 1 Tablet(s) Oral daily  norepinephrine Infusion 0.05 MICROgram(s)/kG/Min (4.26 mL/Hr) IV Continuous <Continuous>  propofol Infusion 10 MICROgram(s)/kG/Min (2.72 mL/Hr) IV Continuous <Continuous>  valproate sodium IVPB 500 milliGRAM(s) IV Intermittent every 12 hours  vancomycin    Solution 125 milliGRAM(s) Oral every 6 hours      Vital Signs Last 24 Hrs  T(C): 36.6 (18 Oct 2020 09:23), Max: 36.8 (17 Oct 2020 21:05)  T(F): 97.9 (18 Oct 2020 09:23), Max: 98.2 (17 Oct 2020 21:05)  HR: 114 (18 Oct 2020 13:00) (85 - 124)  BP: 97/62 (18 Oct 2020 13:00) (85/53 - 147/78)  BP(mean): 74 (18 Oct 2020 13:00) (65 - 94)  RR: 8 (18 Oct 2020 13:00) (8 - 27)  SpO2: 100% (18 Oct 2020 13:00) (89% - 100%)    Exam:  Gen: very thin / cachectic, multiple scars on trunk and arms, poor hygeine, in respiratory distress, intermittent nonpurposeful movements     MS: eyes closed, no spontaneous speech, does not follow commands     CN: pupils intermittently dilated, intermittent vertical nystagmus and roving eye movements, face grossly symmetric    Motor: decreased muscle bulk throughout; moves UE spontaneously     Sensory: unable to assess

## 2020-10-18 NOTE — PROGRESS NOTE ADULT - ASSESSMENT
9M PMHx heroin abuse on methadone x6 months, smokes K2 daily, b/l BKA following an accident, presents with ARF, uraemia, hyperphosphataemia and increased uric acid. Unclear aetiology, GN w/u underway. Initial plan for renal bx now deferred in v/o decompensation    #Acute renal failure, uraemia, oliguria-anuria likely 2/2 to acute urate nephropathy  #R/o GN, paraproteinaemia  #Nephrotic range proteinuria  S/p RIJ temp cath 10/13  S/p first HD 10/13; 2nd 10/14 (last HD session)    No evidence of volume overload, CXR clear  Electrolytes acceptable, HAGMA with met alkalosis persists- degree of acidosis not typically seen by just ARF; mild lac acidosis post sz; check lead level  No need for RRT today, will re-assess daily    Renal sono: normal length, increased echogenicity  Polysubstance abuse- cocaine, methadone, opiate, benzo +Utox; possible toxic ingestion? +Osmol gap; rhabdo ruled out; Check lead level for possible moonshine ingestion  UA +blood, RBCs, prot, bacteria; FeNa 9.01%; Urine PCR 24.6  Urine output ~300-400cc in 24 hours; continue IVF saline @60cc/hr  R/o GN- TITI- pending  ANCA, antiGBM, complements, antidsDNA, RF, cryo: wnl    2 IgG lambda bands identified, kappa/lambda ration 0.61; 2 weak gammaproteins in urine  Haem/onc involved  S/p rasburicase 6mg dose 10/14- uric acid down to 1.4    #HAGMA from renal failure  #Met alkalolsis- low urine chloride <20- GIT losses, likely 2/2 emesis  #Anaemia- iron sat 35%, ferritin 280s  #BMD- hyperphos, low iCa resolved- managed with HD;  while hypocalcaemic

## 2020-10-18 NOTE — PROGRESS NOTE ADULT - ASSESSMENT
39M with PMHx of chronic hepatitis C (untreated), anxiety, polysubstance abuse (K2, IV Heroin, last used 1 week prior to admission, utox also with cocaine and benzos), methadone dependence, spinal cord injury c/b b/l BKA 8-10 years ago, undomiciled recently incarcerated 6 months ago, presented on 10/12 with nausea and decreased PO intake x 2 months, weight loss of 50 lbs, and worsening generalized fatigue x 2 weeks. Admitted for acute renal failure, uremia and hyperkalemia. S/p hemodialysis and pending further workup for nephropathy. Course c/b C. diff infection and seizure.

## 2020-10-18 NOTE — PROGRESS NOTE ADULT - SUBJECTIVE AND OBJECTIVE BOX
Patient is a 39y Male seen and evaluated at bedside. Sz activity wtinessed today- rapid response called. pt subsequently intubated and upgraded to ICU. Started on AED by neurology.    Meds:    acetaminophen  IVPB .. 1000 once  fentaNYL   Infusion. 0.5 <Continuous>  haloperidol    Injectable 5 once PRN  heparin   Injectable 5000 every 12 hours  influenza   Vaccine 0.5 once  lactated ringers Bolus 500 once  LORazepam   Injectable 2 every 2 hours PRN  methadone    Tablet 90 daily  metoclopramide Injectable 5 every 6 hours PRN  midazolam Infusion 0.02 <Continuous>  Nephro-dianne 1 daily  norepinephrine Infusion 0.05 <Continuous>  propofol Infusion 10 <Continuous>  valproate sodium IVPB 500 every 12 hours  vancomycin    Solution 125 every 6 hours      T(C): , Max: 38.3 (10-18-20 @ 12:00)  T(F): , Max: 100.9 (10-18-20 @ 12:00)  HR: 96 (10-18-20 @ 15:10)  BP: 97/64 (10-18-20 @ 15:10)  BP(mean): 76 (10-18-20 @ 15:10)  RR: 16 (10-18-20 @ 15:10)  SpO2: 95% (10-18-20 @ 15:10)  Wt(kg): --    10-18 @ 07:01  -  10-18 @ 15:26  --------------------------------------------------------  IN: 57.4 mL / OUT: 0 mL / NET: 57.4 mL        Weight (kg): 40.9 (10-18 @ 13:00)    Review of Systems: Unabel to assess    PHYSICAL EXAM:  GENERAL: Intubated, sedated  CHEST/LUNG: Clear to auscultation bilaterally  HEART: normal S1S2, RRR  ABDOMEN: Soft, Nontender, non distended  EXTREMITIES: B/l amputations, Left LE oedema  ACCESS: Right IJ temp cath     LABS:                        8.4    7.00  )-----------( 296      ( 18 Oct 2020 11:06 )             28.9     10-18    138  |  91<L>  |  59<H>  ----------------------------<  292<H>  3.9   |  17<L>  |  8.25<H>    Ca    7.4<L>      18 Oct 2020 11:06  Phos  10.6     10-18  Mg     2.0     10-18    TPro  7.3  /  Alb  1.9<L>  /  TBili  0.3  /  DBili  x   /  AST  32  /  ALT  15  /  AlkPhos  438<H>  10-18    Uric Acid, Serum: 0.8 mg/dL <L> [3.4 - 8.8] (10-18 @ 06:56)              RADIOLOGY & ADDITIONAL STUDIES:

## 2020-10-18 NOTE — PROGRESS NOTE ADULT - SUBJECTIVE AND OBJECTIVE BOX
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    10/12: admitted to hospital for anuria, n/v found to have acute renal failure, admitted to [facility]   o/n: renal wanted urgent dialysis. Pt uncooperative with HD catheter placement. HD not done.  10/13: zofran given for nausea. HD cath placed by IR. Undergoing dialysis. Needs repeat labs post dialysis to monitor K, dose vanc after dialysis   o/n: Vanc after HD. Repeat BMP at 10 pm with potassium wnl. Stepped down to [facility].  10/14: Received rasburicase 6mg dose this AM d/t concern for tumor lysis syndrome per nephrology. Had another HD session today. Consulted wound care team as vascular surgery signed off. GI PCR negative. C3/C4, ANCAs, dsDNA negative. PreHD vanc trough 25.6, holding vanc today and will recheck preHD tmrw.  O/N: RIMA, Hep C+  10/15: abdominal xray without obstruction but showing stool burden. Biclonal gammopathy present. Nephrology planning for renal biopsy sometime next week. Pt threw up oral contrast so CT A/P re-ordered without oral contrast. Consulted hemOnc for biclonal gammopathy.  10/16: CT A/P and RUQ US unremarkable. N/V/D improving. Pt refused HD today.   O/N: C. diff positive, started on vanc 125mg PO q6h.  10/17: Overnight was uncomfortable and screaming. AAOx1-2 per night team. This morning AAOx0, curled up and moaning, and not responding to verbal cues or commands. Staring into space at times and noted RUE slow rhythmic twitching at one point. Could not do HD today as pt was combative with dialysis staff and moved around too much to be safely dialyzed. Has been refusing labs but was able to get one set of labs with dialysis staff. Received haldol 5mg IM x1 for agitation. Neuro consulted. Per Dr. Zavala, ordered vEEG to monitor for any possible seizures. Qtc 473-480 on repeat EKGs.     TRANSFER FROM [facility] TO MICU    HOSPITAL COURSE:  39M with PMHx of chronic hepatitis C (untreated), anxiety, polysubstance abuse (K2, IV Heroin, last used 1 week prior to admission, utox also with cocaine and benzos), methadone dependence, spinal cord injury c/b b/l BKA 8-10 years ago, undomiciled recently incarcerated 6 months ago, presented on 10/12 with nausea and decreased PO intake x 2 months, weight loss of 50 lbs, and worsening generalized fatigue x 2 weeks. Initially admitted to [facility] for acute renal failure requiring urgent HD for hyperkalemia to 6 and uremia to 190s. HD cath placed and HD initiated on 10/13. Pt was also started on Vancomycin and CFTX for b/l leg cellulitis, then transferred to [facility] on 10/13. Nephrology raised concern for possible tumor lysis due to elevated uric acid level to 24.9, now resolved s/p rasburicase on 10/14. Malignancy workup negative for mass on CT chest/abd/pelvis but positive for biclonal gammopathy for which Heme/Onc is following. C. diff positive on 10/16, started on PO vancomycin. Pt had acute worsening of mental status 10/17, newly AAOx0 only withdrawing to pain with intermittent RUE twitching. CT head 10/17 negative. EEG placed morning of 10/18. Rapid response called 10:40am 10/18 for witnessed seizure-like activity lasting >2 minutes, not resolving with total of 5mg IV Ativan, with fever, RR to 28 and intermittent hypoxia to 70s. Pt was intubated for airway protection in setting of seizure and transferred to the MICU.      OVERNIGHT EVENTS: Pt agitated and uncomfortable, unable to place EEG, unable to take PO meds. 1mg IV Ativan given at 2am. See chart note. Pt tachycardic overnight to 110s.    SUBJECTIVE / INTERVAL HPI: Patient seen and examined at bedside. A&Ox0, intermittent spontaneous movement of all extremities but no twitching or seizure-like activity. Withdraws to pain in b/l RUE. Unable to obtain ROS.    MEDICATIONS  (STANDING):  fentaNYL   Infusion. 0.5 MICROgram(s)/kG/Hr (2.27 mL/Hr) IV Continuous <Continuous>  heparin   Injectable 5000 Unit(s) SubCutaneous every 12 hours  influenza   Vaccine 0.5 milliLiter(s) IntraMuscular once  methadone    Tablet 90 milliGRAM(s) Oral daily  midazolam Infusion 0.02 mG/kG/Hr (0.91 mL/Hr) IV Continuous <Continuous>  Nephro-dianne 1 Tablet(s) Oral daily  norepinephrine Infusion 0.05 MICROgram(s)/kG/Min (4.26 mL/Hr) IV Continuous <Continuous>  propofol Infusion 10 MICROgram(s)/kG/Min (2.72 mL/Hr) IV Continuous <Continuous>  valproate sodium IVPB 500 milliGRAM(s) IV Intermittent every 12 hours  vancomycin    Solution 125 milliGRAM(s) Oral every 6 hours    MEDICATIONS  (PRN):  haloperidol    Injectable 5 milliGRAM(s) IntraMuscular once PRN agitation, combative with vEEG placement  LORazepam   Injectable 2 milliGRAM(s) IV Push every 2 hours PRN Symptom-triggered: each CIWA -Ar score 8 or GREATER  metoclopramide Injectable 5 milliGRAM(s) IV Push every 6 hours PRN Nausea, vomiting    Allergies    No Known Allergies    Intolerances        VITAL SIGNS:  Vital Signs Last 24 Hrs  T(C): 36.6 (18 Oct 2020 09:23), Max: 36.8 (17 Oct 2020 21:05)  T(F): 97.9 (18 Oct 2020 09:23), Max: 98.2 (17 Oct 2020 21:05)  HR: 114 (18 Oct 2020 12:30) (85 - 124)  BP: 107/69 (18 Oct 2020 12:30) (85/53 - 147/78)  BP(mean): 84 (18 Oct 2020 12:30) (65 - 94)  RR: 10 (18 Oct 2020 12:30) (10 - 27)  SpO2: 99% (18 Oct 2020 12:30) (89% - 100%)        PHYSICAL EXAM:  General: Cachectic young man, laying in bed stuporous  HEENT: EEG leads in place, dilated pupils 5mm bilaterally equal and reactive to light, no nystagmus  Neck: supple, no nuchal rigidity  Cardiovascular: +S1/S2, RRR, No murmurs, rubs, gallops  Respiratory: Decreased breath sounds in all lung fields 2/2 poor inspiratory effort, no W/R/R  Gastrointestinal: soft, nondistended, +BSx4  Extremities: Warm and well perfused, anasarca  Vascular: 2+ radial pulses B/L  Neurological: AAOx0, stuporous, withdraws to painful stimuli in b/l RUE      LABS:                        8.4    7.00  )-----------( 296      ( 18 Oct 2020 11:06 )             28.9     10-18    138  |  91<L>  |  59<H>  ----------------------------<  292<H>  3.9   |  17<L>  |  8.25<H>    Ca    7.4<L>      18 Oct 2020 11:06  Phos  10.6     10-18  Mg     2.0     10-18    TPro  7.3  /  Alb  1.9<L>  /  TBili  0.3  /  DBili  x   /  AST  32  /  ALT  15  /  AlkPhos  438<H>  10-18      CAPILLARY BLOOD GLUCOSE  POCT Blood Glucose.: 89 mg/dL (18 Oct 2020 10:47)        RADIOLOGY & ADDITIONAL TESTS: Reviewed.

## 2020-10-18 NOTE — PROGRESS NOTE ADULT - PROBLEM SELECTOR PLAN 1
Acute worsening of mental status 10/17. Baseline A&Ox2, better with family at bedside, now A&Ox0 only withdrawing to painful stimuli. CT head negative on 10/17. EEG placed morning of 10/18, positive for tonic clonic seizure at 9am. S/p intubation 10/18 for airway protection in setting of seizure.  - Undergoing workup for underlying cause of seizure including worsening infection  - LP if no improvement  - Started Valproic acid per Neurology  - F/u EEG  - F/u Neurology

## 2020-10-18 NOTE — PROCEDURE NOTE - NSPOSTCAREGUIDE_GEN_A_CORE
Care for catheter as per unit/ICU protocols/Instructed patient/caregiver to follow-up with primary care physician/Instructed patient/caregiver regarding signs and symptoms of infection/Verbal/written post procedure instructions were given to patient/caregiver/Keep the cast/splint/dressing clean and dry
Verbal/written post procedure instructions were given to patient/caregiver/Keep the cast/splint/dressing clean and dry/Instructed patient/caregiver regarding signs and symptoms of infection/Care for catheter as per unit/ICU protocols/Instructed patient/caregiver to follow-up with primary care physician

## 2020-10-18 NOTE — PROGRESS NOTE ADULT - PROBLEM SELECTOR PLAN 3
Presenting with LLE cellulitis 2/2 ulceration. s/p bilateral BKA RLE w/ probable chronic limb ischemia and LLE concerning for cellulitis  - LE dopplers r/o DVT showed no DVT   - s/p vancomycin and zosyn in the ED   - wound care consulted  - C/w with CTX 2mg IVPB QD  - F/u repeat vancomycin levels in setting of intermittent HD    #UTI   -UA+  -C/w with CTX 2mg IVPB QD

## 2020-10-18 NOTE — PROGRESS NOTE ADULT - PROBLEM SELECTOR PLAN 2
Presented with decreased urine output over last few days to weeks with elevated  and Cr 9.9, unknown baseline. N/V of several weeks without associated abdominal pain most likely 2/2 uremia. S/p initial HD session on 10/13. Possibly 2/2 underlying malignancy vs nephropathy 2/2 hepatitis C based on hematological workup thus far.  -renal ultrasound showed Mild right calyectasis without dilatation of the renal pelvis and medical renal disease.   - urine lytes revealing post-obstructive JEAN PIERRE  - associated hyperkalemia to 5.4 and hyperuricemia and hyperphosphatemia on admission with EKG showing peaked T waves isolate in V4. Now resolved s/p HD and s/p Rasburicase 6mg x1 on 10/14.  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

## 2020-10-18 NOTE — PROGRESS NOTE ADULT - ATTENDING COMMENTS
seized this am - transfer to MICU  getting continued w/u for sz-- seems unlikely uremia related as only developed s/p 3 HD treatments with significantly improved BUN (from 180 on admit) , lytes   check lead level with very high uric acid and renal failure on admit  renal bx plan on hold at this point

## 2020-10-18 NOTE — PROGRESS NOTE ADULT - PROBLEM SELECTOR PLAN 4
-Patient with 2 months of diarrhea, denies antibiotic use  - improved but persistent since admission  - abdominal xray negative for obstruction causing N/V/D  -CT of abdomen/and RUQ unremarkable for any obvious malignancies contributing to presentation  -Stool PCR negative  -C. diff positive  - started on vanc 125mg PO q6h  - continue contact precautions

## 2020-10-19 NOTE — CONSULT NOTE ADULT - ASSESSMENT
39M with PMHx of chronic hepatitis C (untreated), anxiety, polysubstance abuse (K2, IV Heroin, last used 10/7, utox also with cocaine and benzos), methadone dependence, spinal cord injury c/b b/l BKA 8-10 years ago, undomiciled recently incarcerated 6 months ago, presented on 10/12 with nausea and decreased PO intake x 2 months, weight loss of 50 lbs, and worsening generalized fatigue x 2 weeks. Admitted for acute renal failure, uremia and hyperkalemia. S/p hemodialysis. Hospital course cb c. diff, infection, seizure on 10/17 now intubated and sedated in ICU. Palliative Care consulted for [facility].

## 2020-10-19 NOTE — CONSULT NOTE ADULT - PROBLEM SELECTOR RECOMMENDATION 6
As discussed during the palliative IDT meeting and as identified during the patients PSSA screening the patient would benefit from: patient and family support and ongoing psychosocial support     Support provided to patient and family. Patient to have access to supportive services during rest of hospital stay as the patient/family deemed necessary ie. Chaplaincy, Massage therapy, Music therapy, Patient and family supportive services, Palliative SW, etc.

## 2020-10-19 NOTE — CONSULT NOTE ADULT - PROBLEM SELECTOR RECOMMENDATION 4
In addition to the EM visit, an advance care planning meeting was performed  Start time: 12:00pm  End time: 12:45pm  Total time: 45 min  A face to face meeting to discuss advance care planning was held today regarding: SARAH GONCALVES  Primary decision maker: health law surrogate Marc Goncalves  Alternate/surrogate: none  Discussed advance directives including, but not limited to, healthcare proxy and code status.  Decision regarding code status: DNR/DNI  Documentation completed today: MOLST completed, copied, and placed in physical chart.     Pt's PMHx and chart thoroughly reviewed. Meeting held in private room today with Dr. Whittaker from MICU team, Dr. Mancini and Gretta Anderson Np from Palliative Care with pt's father and health law surrogate, Marc Nunes and his sister Saman. We discussed pt's poor prognosis and that pt will likely be HD dependent if pt is successfully extubated. Medical teams shared their concern that pt may not be a candidate for permanent fistula give pt's current clinical status complicated by his social history, undomiciled, drug user and history of substance abuse. Pt's father and sister state that pt has suffered for many years emotionally and with drug abuse. They "do not want him to suffer... to feel pain, to feel fear." Medical team shared concern that if pt survives extubation there is a risk that pt may have another seizure requiring re-intubation. Discussed that family's wishes align with allowing a natural death. They are in agreement to continue HD with temporary cath for now. All in agreement with DNR/DNI order. MOLST completed, copied and placed in physical chart.     Pt's father and sister grieving, tearful, and emotionally overwhelmed. Appreciative of psychosocial and emotional support. They maintain that though this conversation and decisions are difficult, they are grateful that pt may die peacefully and that they will be able to be with him during his transition versus dying on the street.

## 2020-10-19 NOTE — CONSULT NOTE ADULT - SUBJECTIVE AND OBJECTIVE BOX
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 Denies fever, chest pain, changes in mental status, cough, hemoptysis, weight loss, night sweats. Of note patient reports that he was recently incarcerated for 6 months and was released in July of this year, he reports that he had blood work done and there was no mention of renal dysfunction. He does not follow up with a primary care physician and does not take any medication other than methadone [phone]. He last used heroin 5 days ago but is making a concerted effort to stop using.        In the ED VS T 98 HR 99 /76 R16 96% on RA  Labs: WBC 7 | Hg/Hct 11/35 MCV 77 | Plt 577 | Na 140 K 5.4 | Co2 31 AG 33 BUN/ Cr 192/9.92 | TP/ Alb 8.8/ 2/2 | AST/ALT 22/10  |  EKG: NSR Qtc 468 peaked T waves in V3-V5 unknown baseline, LVH.  CXR - no focal consolidations    ED Course: Famotidine 20, Zofran 4 mg and 2L NS. Nephrology and Vacular Surgery consulted      (12 Oct 2020 17:44)      PAST MEDICAL & SURGICAL HISTORY:  Drug abuse  K2    Heroin abuse    Above-knee amputation of right lower extremity    Above-knee amputation of left lower extremity        FAMILY HISTORY:   Reviewed and found non contributory in mother or father    SOCIAL HISTORY:     ROS:    Unable to attain due to:                      Dyspnea (Kadi 0-10):                        N/V (Y/N):                              Secretions (Y/N) :                Agitation(Y/N):   Pain (Y/N):       -Location:  -Radiating:  -Quality:  -Duration: and Frequency:  -Provocation/palliation:  -24h worst intensity:  -24h least intensity:  -Current intensity:  -Level of pain acceptable:  -Impact on ADLs:        General:  Denied  HEENT:    Denied  Neck:  Denied  CVS:  Denied  Resp:  Denied  GI:  Denied Last BM:     :  Denied  Musc:  Denied  Neuro:  Denied  Psych:  Denied  Skin:  Denied  Lymph:  Denied    Allergies    No Known Allergies    Intolerances      Opiate Naive (Y/N):   -iStop reviewed (Y/N): Yes. No Rx found on iStop review (Ref#:           )    Medications:      MEDICATIONS  (STANDING):  artificial  tears Solution 1 Drop(s) Both EYES every 12 hours  chlorhexidine 0.12% Liquid 15 milliLiter(s) Oral Mucosa every 12 hours  chlorhexidine 2% Cloths 1 Application(s) Topical <User Schedule>  fentaNYL   Infusion. 0.5 MICROgram(s)/kG/Hr (2.27 mL/Hr) IV Continuous <Continuous>  influenza   Vaccine 0.5 milliLiter(s) IntraMuscular once  methadone    Tablet 90 milliGRAM(s) Oral daily  Nephro-dianne 1 Tablet(s) Oral daily  norepinephrine Infusion 0.05 MICROgram(s)/kG/Min (4.26 mL/Hr) IV Continuous <Continuous>  piperacillin/tazobactam IVPB.. 2.25 Gram(s) IV Intermittent every 8 hours  propofol Infusion 10 MICROgram(s)/kG/Min (2.72 mL/Hr) IV Continuous <Continuous>  valproate sodium IVPB 500 milliGRAM(s) IV Intermittent every 12 hours  vancomycin    Solution 125 milliGRAM(s) Oral every 6 hours    MEDICATIONS  (PRN):  LORazepam   Injectable 2 milliGRAM(s) IV Push every 2 hours PRN Symptom-triggered: each CIWA -Ar score 8 or GREATER      Labs:    CBC:                        7.8    10.03 )-----------( 352      ( 19 Oct 2020 05:40 )             24.8     CMP:    10-19    139  |  96  |  62<H>  ----------------------------<  80  2.7<LL>   |  23  |  8.36<H>    Ca    6.8<L>      19 Oct 2020 05:40  Phos  8.6     10-19  Mg     2.0     10-19    TPro  6.4  /  Alb  1.7<L>  /  TBili  0.2  /  DBili  x   /  AST  25  /  ALT  12  /  AlkPhos  416<H>  10-19   Albumin, Serum: 1.7 g/dL (10-19-20 @ 05:40)           Imaging:  Reviewed    PEx:  T(C): 37.3 (10-19-20 @ 14:00), Max: 37.8 (10-19-20 @ 10:00)  HR: 142 (10-19-20 @ 14:45) (74 - 146)  BP: 116/81 (10-19-20 @ 14:00) (88/58 - 144/93)  RR: 32 (10-19-20 @ 14:45) (11 - 196)  SpO2: 100% (10-19-20 @ 14:45) (93% - 100%)  Wt(kg): --  Daily     Daily     General:   HEENT:    Neck:   CVS:   Resp:   GI:    :    Musc:     Neuro:   Psych:     Skin:  Lymph:  Preadmit Karnofsky:  %           Current Karnofsky:     %  http://www.npcrc.org/files/news/karnofsky_performance_scale.pdf   http://www.npcrc.org/files/news/palliative_performance_scale_PPSv2.pdf  Cachexia (Y/N):   BMI:    Advanced Directives:     Initially Full Code, now DNR/DNI no escalation of care     DNR/DNI     MOLST not on file in [facility]     HCP, DPOA, Living Will: no formal form not in [facility]    Decision maker: The patient is able to participate in complex medical decision making conversations.   Legal surrogate:    GOALS OF CARE DISCUSSION       Palliative care info/counseling provided	           Advanced Directives addressed please see Advance Care Planning Note	           Documentation of GOC: 	    REFERRALS: MARIO PARK          MRN-[id number]              1980    HPI:  39M Undomiciled PMH Heroin abuse (on methadone) with spinal cord injury c/b b/l BKA 8-10 years ago ([facility]) now presents with worsening fatigue for the last 2 weeks, in the setting of 2 months of nausea and vomiting. Denies fever, chest pain, changes in mental status, cough, hemoptysis, weight loss, night sweats. Of note patient reports that he was recently incarcerated for 6 months and was released in July of this year, he reports that he had blood work done and there was no mention of renal dysfunction. He does not follow up with a primary care physician and does not take any medication other than methadone [phone]. He last used heroin 5 days ago but is making a concerted effort to stop using.      In the ED VS T 98 HR 99 /76 R16 96% on RA  Labs: WBC 7 | Hg/Hct 11/35 MCV 77 | Plt 577 | Na 140 K 5.4 | Co2 31 AG 33 BUN/ Cr 192/9.92 | TP/ Alb 8.8/ 2/2 | AST/ALT 22/10  |  EKG: NSR Qtc 468 peaked T waves in V3-V5 unknown baseline, LVH.  CXR - no focal consolidations    ED Course: Famotidine 20, Zofran 4 mg and 2L NS. Nephrology and Vacular Surgery consulted    (12 Oct 2020 17:44)    PAST MEDICAL & SURGICAL HISTORY:  Drug abuse  K2  Heroin abuse  Above-knee amputation of right lower extremity  Above-knee amputation of left lower extremity    FAMILY HISTORY:   Reviewed and found non contributory in mother or father    SOCIAL HISTORY: undomiciled. initially from [place]. recently incarcerated for six months, released in July. hx of heroin, cocaine abuse. utox with K2 and benzos. pt has two children, Mario and Myron. per pt's father, Marc and his sister Saman at bedside, pt's father, Marc, has been the most involved in pt's care.     ROS:    Unable to attain due to:  unable to obtain, pt intubated and sedated                    Dyspnea (Kadi 0-10):    0                    N/V (Y/N):       N                       Secretions (Y/N) :       N         Agitation(Y/N): Y  Pain (Y/N):     unknown    Allergies  No Known Allergies  Intolerances    Opiate Naive (Y/N): N  -iStop reviewed (Y/N): Yes. Rx found on iStop review (Ref#: : [id number]   )    Medications:      MEDICATIONS  (STANDING):  artificial  tears Solution 1 Drop(s) Both EYES every 12 hours  chlorhexidine 0.12% Liquid 15 milliLiter(s) Oral Mucosa every 12 hours  chlorhexidine 2% Cloths 1 Application(s) Topical <User Schedule>  fentaNYL   Infusion. 0.5 MICROgram(s)/kG/Hr (2.27 mL/Hr) IV Continuous <Continuous>  influenza   Vaccine 0.5 milliLiter(s) IntraMuscular once  methadone    Tablet 90 milliGRAM(s) Oral daily  Nephro-dianne 1 Tablet(s) Oral daily  norepinephrine Infusion 0.05 MICROgram(s)/kG/Min (4.26 mL/Hr) IV Continuous <Continuous>  piperacillin/tazobactam IVPB.. 2.25 Gram(s) IV Intermittent every 8 hours  propofol Infusion 10 MICROgram(s)/kG/Min (2.72 mL/Hr) IV Continuous <Continuous>  valproate sodium IVPB 500 milliGRAM(s) IV Intermittent every 12 hours  vancomycin    Solution 125 milliGRAM(s) Oral every 6 hours    MEDICATIONS  (PRN):  LORazepam   Injectable 2 milliGRAM(s) IV Push every 2 hours PRN Symptom-triggered: each CIWA -Ar score 8 or GREATER      Labs:    CBC:                        7.8    10.03 )-----------( 352      ( 19 Oct 2020 05:40 )             24.8     CMP:    10-19    139  |  96  |  62<H>  ----------------------------<  80  2.7<LL>   |  23  |  8.36<H>    Ca    6.8<L>      19 Oct 2020 05:40  Phos  8.6     10-19  Mg     2.0     10-19    TPro  6.4  /  Alb  1.7<L>  /  TBili  0.2  /  DBili  x   /  AST  25  /  ALT  12  /  AlkPhos  416<H>  10-19   Albumin, Serum: 1.7 g/dL (10-19-20 @ 05:40)    Imaging:  Reviewed    < from: Xray Chest 1 View-PORTABLE IMMEDIATE (Xray Chest 1 View-PORTABLE IMMEDIATE .) (10.12.20 @ 15:16) >  IMPRESSION:  Marked hypoinflation. No lung infiltrate pleural effusion or pneumothorax. Spine hardware present. No acute acute bone abnormality.    < from: US Duplex Venous Lower Ext Ltd, Left (10.12.20 @ 16:07) >  IMPRESSION:  No deep vein thrombosis seen.    < from: CT Abdomen and Pelvis w/ IV Cont (10.15.20 @ 18:49) >  IMPRESSION:  Significant streak artifact from spinal and bilateral femoral ORIF hardware.  1. Gallbladder sludge. No biliary ductal dilatation.  2. No evidence of nephro- or urolithiasis. No hydronephrosis.  3. No bowel obstruction.    < from: CT Head No Cont (10.17.20 @ 13:35) >  IMPRESSION:  1. No acute intracranial hemorrhage.  2. Apparent increased attenuation of the dural venous sinuses which may be due to hemoconcentration or related to patient's hydration status. If dural sinus thrombosis rhys clinical concern, MR brain and venogram are recommended.  3. Remote bilateral orbitofacial fractures, as above.    < from: Xray Chest 1 View-PORTABLE IMMEDIATE (Xray Chest 1 View-PORTABLE IMMEDIATE .) (10.18.20 @ 12:13) >  IMPRESSION: Discoid change left lung base. Endotracheal tube in satisfactory position    < from: Xray Chest 1 View- PORTABLE-Routine (Xray Chest 1 View- PORTABLE-Routine in AM.) (10.19.20 @ 06:29) >  Findings/  impression: Stable positioning of support devices. Left basilar opacity/pleural effusion, increased. Right pleural effusion, stable. Heart and mediastinum are unremarkable. Stable bony structures.    PEx:  T(C): 37.3 (10-19-20 @ 14:00), Max: 37.8 (10-19-20 @ 10:00)  HR: 142 (10-19-20 @ 14:45) (74 - 146)  BP: 116/81 (10-19-20 @ 14:00) (88/58 - 144/93)  RR: 32 (10-19-20 @ 14:45) (11 - 196)  SpO2: 100% (10-19-20 @ 14:45) (93% - 100%)  Wt(kg): 40.9 kg  Daily       General: Cachectic man appearing older than stated age, intubated and sedated in bed  HEENT: EEG leads in place, dilated pupils 5mm bilaterally equal and reactive to light, no nystagmus  Neck: supple, no nuchal rigidity, +central line  Cardiovascular: +S1/S2, RRR, No murmurs, rubs, gallops, tachycardia   Respiratory: Decreased breath sounds in all lung fields 2/2 poor inspiratory effort, no W/R/R, intubated   Gastrointestinal: soft, nondistended, +BSx4  Extremities: Warm and well perfused, anasarca, b/l BKA  Vascular: 2+ radial pulses B/L   Neurological: EMILY, withdraws from pain, unable to assess remainder  Preadmit Karnofsky:  40%           Current Karnofsky:    20 %  http://www.npcrc.org/files/news/karnofsky_performance_scale.pdf   http://www.npcrc.org/files/news/palliative_performance_scale_PPSv2.pdf  Cachexia (Y/N): N  BMI: 14.6     Advanced Directives:     Initially Full Code, GOC conversation today, now DNR/DNI no escalation of care     MOLST not on file in [facility]. Completed today 10/19, DNR/DNI     HCP, DPOA, Living Will: no formal form not in [facility]    Decision maker: The patient is UNable to participate in complex medical decision making conversations.   Legal surrogate: pt's father, Marc Nunes #[phone], pt aox3 at admission, documenting his father Marc as health care surrogate     GOALS OF CARE DISCUSSION	           Advanced Directives addressed please see Advance Care Planning Note 10/19	           Documentation of GOC: 	DNR/DNI, no escalation of care. MOLST completed 10/19 and placed in physical chart. Pt's father in agreement with HD and antibiotics for now.     REFERRALS: patient and family support.

## 2020-10-19 NOTE — PROGRESS NOTE ADULT - ASSESSMENT
39M with PMHx of chronic hepatitis C (untreated), anxiety, polysubstance abuse (K2, IV Heroin, last used 1 week prior to admission, utox also with cocaine and benzos), methadone dependence, spinal cord injury c/b b/l BKA 8-10 years ago, undomiciled recently incarcerated 6 months ago, presented on 10/12 with nausea and decreased PO intake x 2 months, weight loss of 50 lbs, and worsening generalized fatigue x 2 weeks. Admitted for acute renal failure, uremia and hyperkalemia. S/p hemodialysis and pending further workup for nephropathy. Course c/b C. diff infection and seizure.      NEURO:  #Altered mental status, CT head negative on 10/17. EEG placed morning of 10/18, positive for tonic clonic seizure at 9am. S/p intubation 10/18 for airway protection in setting of seizure and transfer to MICU  - Propofol, versed, and fentanyl for sedation with intubation  - Undergoing workup for underlying cause of seizure including worsening infection  - LP if no improvement  - Valproate 500 IV Q12 for seizure PPX  - EEG removed for lice treatment, will be replaced after  - F/u Neurology.     PULM:  Patient intubated for airway protection  - sedated with propofol, versed, and fentanyl    CARDIO:  Elevated troponin i/s/o ARF with no ischemic changes on EKG and elevated proBNP of 7428. Likely demand ischemia  -Trops measured twice at 0.23 but not trended, f/u trop with AM lab for resolution  -TTE normal with EF 64%.  -levophed for BP support while sedated with goal MAP >65     GI:  #C Diff positive, xray and CT negative for malignancy or obstruction  - c/w vanc 125 mg Q6  - continue contact precautions.     #Elevated alkaline phosphatase level, i/s/o elevated GGT and chronic Hep C, possible intrahepatic cholestasis  - Alk phos continues to be elevated, today 527  - Bilirubin level has been normal since admission  - RUQ US negative for biliary pathology, bile ducts not dilated, echogenic portal triads consistent with chronic hepC infection  - Sent multiple myeloma workup  - continue to trend Alkphos and monitor for any symptoms.     # Chronic Hepatitis C, with hep B and HIV neg, RUQ US shows echogenic portal triads  - Elevated Alkphos, AST/ALT wnl   - elev   - continue to trend LFTs, management as per above.    ENDO:  Last A1c 5.5, on FS q6 with sliding scale while NPO in ICU, pending NGT placement    RENAL:  JEAN PIERRE with associated hyperkalemia to 5.4 and hyperuricemia and hyperphosphatemia on admission with EKG showing peaked T waves isolate in V4. Now resolved s/p HD and s/p Rasburicase 6mg x1 on 10/14. Urine lytes consistent with post-obstructive JEAN PIERRE, biclonal gammopathy present, and renal US showed Mild right calyectasis without dilatation of the renal pelvis and medical renal disease.   - Nephro following, unclear etiology, will follow recs  - No need for HD today  - Trend Cr, Uric Acid, Phosphate  - reglan PRN nausea, vomiting  - Lead level in AM  - Repeat UTox    ID:  LLE cellulitis 2/2 ulceration. s/p bilateral BKA, RLE w/ probable chronic limb ischemia and LLE concerning for cellulitis  - LE dopplers r/o DVT   - s/p vancomycin and zosyn in the ED, plus Vanc 1000 mg IV one time 10/18   - wound care consulted  - F/U recs for ABx    #UTI   - UA+ but no UCx resulted  - F/U UCx    HEME:  #Normocytic anemia most likely 2/2 ACD vs ALEX in setting of renal dysfunction. No signs of acute blood loss.   - B12 elevated 1793 and Folate 13, iron studies consistent with mixed picture ALEX and ACD   - Transfuse Hg<7  - Maintain active Type and Screen.     OTHER:  #Lice found on head and pubic hair on admission to MICU, EEG removed for treatment  -permethrin 5% cream to all hair    #Polysubstance abuse with recent incarceration, endorses cocaine, heroin (5 days ago) and K2 use. Now on methadone 90mg qd.   - Utox on admission positive for opioids, cocaine, benzos and methadone   - confirmed methadone dosing 90mg qd - [phone] [facility] [address], [place] - [id number]  - C/w methadone 90mg qd  - Avoid beta blockers  - HIV neg, positive Hep C antibody   - TB quant negative   - Monitor for signs of withdrawal, on CIWA protocol for now  - patient endorses IV drug use with no specific location of use, continue to treat patient with Vancomycin and Ceftriaxone for endocarditis prophylaxis in the setting of cellulitis as stated above.     ACCESS/LINES:  2 Peripheral IV lines, L sided Central line, A line      PREVENTATIVE:  F: Replete PRN  E: Replete PRN  N: NGT  DVT PPx: Heparin 5000 SubQ Q12  GI PPx: None  DISPO: MICU    FULL CODE           39M with PMHx of chronic hepatitis C (untreated), anxiety, polysubstance abuse (K2, IV Heroin, last used 1 week prior to admission, utox also with cocaine and benzos), methadone dependence, spinal cord injury c/b b/l BKA 8-10 years ago, undomiciled recently incarcerated 6 months ago, presented on 10/12 with nausea and decreased PO intake x 2 months, weight loss of 50 lbs, and worsening generalized fatigue x 2 weeks. Admitted for acute renal failure, uremia and hyperkalemia. S/p hemodialysis and pending further workup for nephropathy. Course c/b C. diff infection and seizure.      NEURO:  #Altered mental status, CT head negative on 10/17. EEG placed morning of 10/18, positive for tonic clonic seizure at 9am. S/p intubation 10/18 for airway protection in setting of seizure and transfer to MICU  - Propofol, versed, and fentanyl for sedation with intubation  - Undergoing workup for underlying cause of seizure including worsening infection  - LP if no improvement  - Valproate 500 IV Q12 for seizure PPX  - EEG removed for lice treatment, will be replaced after  - F/u Neurology.     PULM:  Patient intubated for airway protection  - sedated with propofol, versed, and fentanyl    CARDIO:  Elevated troponin i/s/o ARF with no ischemic changes on EKG and elevated proBNP of 7428. Likely demand ischemia  -Trops measured twice at 0.23 but not trended, f/u trop with AM lab for resolution  -TTE normal with EF 64%.  -levophed for BP support while sedated with goal MAP >65     GI:  #C Diff positive, xray and CT negative for malignancy or obstruction  - c/w vanc 125 mg Q6  - continue contact precautions.     #Elevated alkaline phosphatase level, i/s/o elevated GGT and chronic Hep C, possible intrahepatic cholestasis  - Alk phos continues to be elevated, today 527  - Bilirubin level has been normal since admission  - RUQ US negative for biliary pathology, bile ducts not dilated, echogenic portal triads consistent with chronic hepC infection  - Sent multiple myeloma workup  - continue to trend Alkphos and monitor for any symptoms.     # Chronic Hepatitis C, with hep B and HIV neg, RUQ US shows echogenic portal triads  - Elevated Alkphos, AST/ALT wnl   - elev   - continue to trend LFTs, management as per above.    ENDO:  Last A1c 5.5, on FS q6 with sliding scale while NPO in ICU, pending NGT placement    RENAL:  JEAN PIERRE with associated hyperkalemia to 5.4 and hyperuricemia and hyperphosphatemia on admission with EKG showing peaked T waves isolate in V4. Now resolved s/p HD and s/p Rasburicase 6mg x1 on 10/14. Urine lytes consistent with post-obstructive JEAN PIERRE, biclonal gammopathy present, and renal US showed Mild right calyectasis without dilatation of the renal pelvis and medical renal disease.   - Nephro following, unclear etiology, will follow recs  - No need for HD today  - Trend Cr, Uric Acid, Phosphate  - reglan PRN nausea, vomiting  - Lead level in AM  - Repeat UTox    ID:  LLE cellulitis 2/2 ulceration. s/p bilateral BKA, RLE w/ probable chronic limb ischemia and LLE concerning for cellulitis  - LE dopplers r/o DVT   - s/p vancomycin and zosyn in the ED, plus Vanc 1000 mg IV one time 10/18   - wound care consulted  - F/U recs for ABx    #UTI   - UA+ but no UCx resulted  - F/U UCx    HEME:  #Normocytic anemia most likely 2/2 ACD vs ALEX in setting of renal dysfunction. No signs of acute blood loss.   - B12 elevated 1793 and Folate 13, iron studies consistent with mixed picture ALEX and ACD   - Transfuse Hg<7  - Maintain active Type and Screen.     OTHER:  #Lice found on head and pubic hair on admission to MICU, EEG removed for treatment  -permethrin 5% cream to all hair    #Polysubstance abuse with recent incarceration, endorses cocaine, heroin (5 days ago) and K2 use. Now on methadone 90mg qd.   - Utox on admission positive for opioids, cocaine, benzos and methadone   - confirmed methadone dosing 90mg qd - [phone] [facility] [address], [place] - [id number]  - C/w methadone 90mg qd  - Avoid beta blockers  - HIV neg, positive Hep C antibody   - TB quant negative   - Monitor for signs of withdrawal, on CIWA protocol for now  - patient endorses IV drug use with no specific location of use, continue to treat patient with Vancomycin and Ceftriaxone for endocarditis prophylaxis in the setting of cellulitis as stated above.     ACCESS/LINES:  2 Peripheral IV lines, L sided Central line, A line      PREVENTATIVE:  F: Replete PRN  E: Replete K<4, Mg<2  N: NGT  DVT PPx: Heparin 5000 SubQ Q12  GI PPx: None  DISPO: MICU    FULL CODE

## 2020-10-19 NOTE — CHART NOTE - NSCHARTNOTEFT_GEN_A_CORE
Patient seen and evaluated for concerns for status epilepticus. Exam revealed rhythmic mouth movement, and previously witnessed eye roving. Medicine administered a total of 6mg of ativan and increased the dose of propofol drip.     Recommendations:  Given clinical and electrographic events, recommended Vimpat 100mg load then 100mg IV Q12hrs.   At this time, patient was given Keppra 1G Q12hrs followed by Ketamine drip as per medicine ICU team.   Maintain seizure and fall precautions  Will follow TO GI LAB NOW FOR EGD WITH MAC

## 2020-10-19 NOTE — PROCEDURE NOTE - NSINFORMCONSENT_GEN_A_CORE

## 2020-10-19 NOTE — PROCEDURE NOTE - NSSITEPREP_SKIN_A_CORE
Adherence to aseptic technique: hand hygiene prior to donning barriers (gown, gloves), don cap and mask, sterile drape over patient/chlorhexidine
Adherence to aseptic technique: hand hygiene prior to donning barriers (gown, gloves), don cap and mask, sterile drape over patient/chlorhexidine
chlorhexidine
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
## 2020-10-19 NOTE — GOALS OF CARE CONVERSATION - ADVANCED CARE PLANNING - CONVERSATION DETAILS
Pt's PMHx and chart thoroughly reviewed. Meeting held in private room today with Dr. Whittaker from MICU team, Dr. Mancini and Gretta Anderson Np from Palliative Care with pt's father and health law surrogate, Marc Nunes and his sister Saman. We discussed pt's poor prognosis and that pt will likely be HD dependent if pt is successfully extubated. Medical teams shared their concern that pt may not be a candidate for permanent fistula give pt's current clinical status complicated by his social history, undomiciled, drug user and history of substance abuse. Pt's father and sister state that pt has suffered for many years emotionally and with drug abuse. They "do not want him to suffer... to feel pain, to feel fear." Medical team shared concern that if pt survives extubation there is a risk that pt may have another seizure requiring re-intubation. Discussed that family's wishes align with allowing a natural death. They are in agreement to continue HD with temporary cath for now. All in agreement with DNR/DNI order. MOLST completed, copied and placed in physical chart.     Pt's father and sister grieving, tearful, and emotionally overwhelmed. Appreciative of psychosocial and emotional support. They maintain that though this conversation and decisions are difficult, they are grateful that pt may die peacefully and that they will be able to be with him during his transition versus dying on the street.

## 2020-10-19 NOTE — PROGRESS NOTE ADULT - ATTENDING COMMENTS
Patient seen and examined with house-staff during bedside rounds  Resident note read, including vitals, physical findings, laboratory data, and radiological reports.   Revisions included below.  Case discussed with House staff  Direct personal management at bedside  and extensive interpretation of data. Decision making of high complexity.    In addition patient on afternoon rounds was in status ellipticus. Discussed with Dr. Rodriguez and will place on IV Propofol , increase dose of Keppra,   Also Ketamine drip   Epilepsy will continue to follow patient :

## 2020-10-19 NOTE — CONSULT NOTE ADULT - PROBLEM SELECTOR RECOMMENDATION 9
HD catheter placement
CT head negative on 10/17. EEG placed 10/18, + tonic clonic seizure at 0900. S/p intubation in setting of seizure and transfer to MICU on 10/18. Etiology of seizure unclear, sp LP on 10/19, infection work up  - Appreciate MICU management, Neurology recs  - Propofol, versed, and fentanyl for sedation with intubation  - Valproate 500 IV Q12 for seizure PPX  - EEG removed for lice treatment, will be replaced sp tx  - Pt' health law surrogate, father Marc in agreement with DNR/DNI order if pt does not survive extubation / if pt has seizure sp extubation

## 2020-10-19 NOTE — PROGRESS NOTE ADULT - ATTENDING COMMENTS
I independently performed the key portions of the evaluation and management service provided. I agree with the above history, physical, and plan which I have reviewed and edited where appropriate. I find JEAN PIERRE, hypotension, seizure.  Will plan on CVVHD vs HD tomorrow. Keep MAP > 65.  Suspect possible benzo withdrawal seizure. See full note. (Patient seen earlier in day.)

## 2020-10-19 NOTE — PROGRESS NOTE ADULT - SUBJECTIVE AND OBJECTIVE BOX
Interval events:  - overnight temp 96 degrees  - primary team had GOC discussion, made DNR/DNI  - again placed on EEG today in am, yesterday take off due to lice  - LP today, CSF notable for protein 150 likely nonspecific after seizures    Vital Signs Last 24 Hrs  T(C): 37.3 (19 Oct 2020 14:00), Max: 37.8 (19 Oct 2020 10:00)  T(F): 99.2 (19 Oct 2020 14:00), Max: 100 (19 Oct 2020 10:00)  HR: 120 (19 Oct 2020 16:00) (74 - 146)  BP: 115/79 (19 Oct 2020 16:00) (88/58 - 144/93)  BP(mean): 90 (19 Oct 2020 16:00) (68 - 108)  RR: 30 (19 Oct 2020 16:00) (13 - 196)  SpO2: 100% (19 Oct 2020 16:00) (93% - 100%)    LABS:                         7.8    10.03 )-----------( 352      ( 19 Oct 2020 05:40 )             24.8     10-19    139  |  96  |  62<H>  ----------------------------<  80  2.7<LL>   |  23  |  8.36<H>    Ca    6.8<L>      19 Oct 2020 05:40  Phos  8.6     10-19  Mg     2.0     10-19    TPro  6.4  /  Alb  1.7<L>  /  TBili  0.2  /  DBili  x   /  AST  25  /  ALT  12  /  AlkPhos  416<H>  10-19        CARDIAC MARKERS ( 19 Oct 2020 09:31 )  x     / 0.28 ng/mL / x     / x     / x            Lactate, Blood: 0.8 mmol/L (10-19 @ 05:39)      RADIOLOGY, EKG & ADDITIONAL TESTS: Interval events:  - overnight temp 96 degrees  - primary team had GOC discussion, made DNR/DNI  - again placed on EEG today in am, yesterday take off due to lice  - LP today, CSF notable for protein 150 likely nonspecific after seizures  - again with seizure activity on EEG today, started on Keppra and Ketamine    Vital Signs Last 24 Hrs  T(C): 37.3 (19 Oct 2020 14:00), Max: 37.8 (19 Oct 2020 10:00)  T(F): 99.2 (19 Oct 2020 14:00), Max: 100 (19 Oct 2020 10:00)  HR: 120 (19 Oct 2020 16:00) (74 - 146)  BP: 115/79 (19 Oct 2020 16:00) (88/58 - 144/93)  BP(mean): 90 (19 Oct 2020 16:00) (68 - 108)  RR: 30 (19 Oct 2020 16:00) (13 - 196)  SpO2: 100% (19 Oct 2020 16:00) (93% - 100%)    LABS:                         7.8    10.03 )-----------( 352      ( 19 Oct 2020 05:40 )             24.8     10-19    139  |  96  |  62<H>  ----------------------------<  80  2.7<LL>   |  23  |  8.36<H>    Ca    6.8<L>      19 Oct 2020 05:40  Phos  8.6     10-19  Mg     2.0     10-19    TPro  6.4  /  Alb  1.7<L>  /  TBili  0.2  /  DBili  x   /  AST  25  /  ALT  12  /  AlkPhos  416<H>  10-19        CARDIAC MARKERS ( 19 Oct 2020 09:31 )  x     / 0.28 ng/mL / x     / x     / x            Lactate, Blood: 0.8 mmol/L (10-19 @ 05:39)    Exam:      Gen: intubated, very thin / cachectic, multiple scars on trunk and arms, poor hygiene, intermittent nonpurposeful movements     MS: eyes closed, no spontaneous speech, does not follow commands     + pupillary reflex + corneal reflex + doll's eyes    CN: pupils intermittently dilated, intermittent  roving eye movements, face grossly symmetric    Motor: Upper extremities - posturing on nail-bed pressure     Sensory: unable to assess

## 2020-10-19 NOTE — PROGRESS NOTE ADULT - ATTENDING COMMENTS
I was physically present for the key portions of the evaluation and managemnent (E/M) service provided.  I agree with the above history, physical, and plan which I have reviewed and edited where appropriate, with the exceptions as per my note.    pt seen and examined.    on my exam, pt with eyes open, roving eye moves, perrl, +dolls, + corneals, no BTT or response to light, no withdrawal to noxious.    CTH noted  CSF results reviewed.    AP: seizures of unclear etiology  -AEDs as per above  - brain MRI w and wo when stabilized.

## 2020-10-19 NOTE — PROGRESS NOTE ADULT - SUBJECTIVE AND OBJECTIVE BOX
Patient is a 39y Male seen and evaluated at bedside. Intubated, sedated. On antiepileptics. Anuric. Hypokalaemic. BP on pressors.    Meds:    artificial  tears Solution 1 every 12 hours  chlorhexidine 0.12% Liquid 15 every 12 hours  chlorhexidine 2% Cloths 1 <User Schedule>  fentaNYL   Infusion. 0.5 <Continuous>  haloperidol    Injectable 5 once PRN  influenza   Vaccine 0.5 once  LORazepam   Injectable 2 every 2 hours PRN  methadone    Tablet 90 daily  Nephro-dianne 1 daily  norepinephrine Infusion 0.05 <Continuous>  piperacillin/tazobactam IVPB.. 2.25 every 8 hours  propofol Infusion 10 <Continuous>  valproate sodium IVPB 500 every 12 hours  vancomycin    Solution 125 every 6 hours      T(C): , Max: 37.8 (10-19-20 @ 10:00)  T(F): , Max: 100 (10-19-20 @ 10:00)  HR: 132 (10-19-20 @ 12:00)  BP: 144/93 (10-19-20 @ 12:00)  BP(mean): 108 (10-19-20 @ 12:00)  RR: 26 (10-19-20 @ 12:00)  SpO2: 100% (10-19-20 @ 12:00)  Wt(kg): --    10-18 @ 07:01  -  10-19 @ 07:00  --------------------------------------------------------  IN: 1828 mL / OUT: 5 mL / NET: 1823 mL    10-19 @ 07:01  -  10-19 @ 12:35  --------------------------------------------------------  IN: 634.8 mL / OUT: 30 mL / NET: 604.8 mL        Weight (kg): 40.9 (10-18 @ 13:00)    Review of Systems: Unable to participate    PHYSICAL EXAM:  GENERAL: Intubated, sedated  CHEST/LUNG: Clear to auscultation bilaterally  HEART: normal S1S2, RRR  ABDOMEN: Soft, Nontender, non distended  EXTREMITIES: B/l amputations, Left LE oedema- non pitting  ACCESS: Right IJ temp cath       LABS:                        7.8    10.03 )-----------( 352      ( 19 Oct 2020 05:40 )             24.8     10-19    139  |  96  |  62<H>  ----------------------------<  80  2.7<LL>   |  23  |  8.36<H>    Ca    6.8<L>      19 Oct 2020 05:40  Phos  8.6     10-19  Mg     2.0     10-19    TPro  6.4  /  Alb  1.7<L>  /  TBili  0.2  /  DBili  x   /  AST  25  /  ALT  12  /  AlkPhos  416<H>  10-19    Uric Acid, Serum: 1.2 mg/dL <L> [3.4 - 8.8] (10-19 @ 05:40)              RADIOLOGY & ADDITIONAL STUDIES:

## 2020-10-19 NOTE — PROVIDER CONTACT NOTE (CRITICAL VALUE NOTIFICATION) - BACKGROUND
intubated and sedated yesterday after seizure type activity and altered mental status with no airway protection

## 2020-10-19 NOTE — CONSULT NOTE ADULT - PROBLEM SELECTOR RECOMMENDATION 2
JEAN PIERRE with associated hyperkalemia to 5.4 and hyperuricemia and hyperphosphatemia on admission with EKG showing peaked T waves isolate in V4. Now resolved s/p HD and s/p Rasburicase 6mg x1 on 10/14. Urine lytes consistent with post-obstructive JEAN PIERRE, biclonal gammopathy present, and renal US showed Mild right calyectasis without dilatation of the renal pelvis and medical renal disease.   -Appreciate MICU and Nephr managment  -No HD today  -Continuing HD with temporary cath for now in alignment with surrogate's GOC  - Nephro following, unclear etiology, will follow recs  - No need for HD today  - Trend Cr, Uric Acid, Phosphate  - reglan PRN nausea, vomiting  - Lead level in AM  - Repeat UTox

## 2020-10-19 NOTE — CONSULT NOTE ADULT - PROBLEM SELECTOR RECOMMENDATION 3
endorses cocaine, heroin (last use 10/7). utox with benzos, K2 use.  Methadone 90mg qd.   - Utox on admission positive for opioids, cocaine, benzos and methadone   - confirmed methadone dosing 90mg qd - [phone] [facility] [address], [place] - [id number]  - C/w methadone 90mg qd  - Beta blockers appropriate for now given seizure activity and family's goal to keep pt comfortable, without pain, or suffering in the event of a seizure   - HIV neg, positive Hep C antibody   - TB quant negative   - Monitor for signs of withdrawal, CIWA protocol for now  - Continue with Vancomycin and Ceftriaxone for endocarditis prophylaxis in the setting of cellulitis as stated above  -Patient and family support for pt's family

## 2020-10-19 NOTE — PROCEDURE NOTE - NSPROCDETAILS_GEN_ALL_CORE
left nares/location identified, feeding tube inserted
location identified, draped/prepped, sterile technique used/blood seen on insertion/dressing applied/flushes easily/secured in place/sterile technique, catheter placed/ultrasound utilization
sterile dressing applied/guidewire recovered/sterile technique, catheter placed/ultrasound guidance/lumen(s) aspirated and flushed
CSF Obtained/area cleaned in sterile fashion/location identified, draped/prepped, sterile technique used, needle inserted/introduced

## 2020-10-19 NOTE — PROGRESS NOTE ADULT - SUBJECTIVE AND OBJECTIVE BOX
***INCOMPLETE*****  Patient is a 39y old  Male who presents with a chief complaint of Acute Renal Failure (18 Oct 2020 14:12)      INTERVAL HPI/OVERNIGHT EVENTS:   No overnight events   Afebrile, hemodynamically stable     ICU Vital Signs Last 24 Hrs  T(C): 37.7 (19 Oct 2020 05:00), Max: 38.3 (18 Oct 2020 12:00)  T(F): 99.9 (19 Oct 2020 05:00), Max: 100.9 (18 Oct 2020 12:00)  HR: 96 (19 Oct 2020 08:00) (74 - 124)  BP: 108/69 (19 Oct 2020 08:00) (85/53 - 128/73)  BP(mean): 84 (19 Oct 2020 08:00) (65 - 94)  ABP: 122/59 (19 Oct 2020 08:00) (106/47 - 138/66)  ABP(mean): 76 (19 Oct 2020 08:00) (59 - 84)  RR: 15 (19 Oct 2020 07:00) (8 - 196)  SpO2: 98% (19 Oct 2020 08:00) (89% - 100%)    I&O's Summary    18 Oct 2020 07:01  -  19 Oct 2020 07:00  --------------------------------------------------------  IN: 1728 mL / OUT: 5 mL / NET: 1723 mL    19 Oct 2020 07:01  -  19 Oct 2020 08:43  --------------------------------------------------------  IN: 21.3 mL / OUT: 0 mL / NET: 21.3 mL      Mode: AC/ CMV (Assist Control/ Continuous Mandatory Ventilation)  RR (machine): 15  TV (machine): 400  FiO2: 60  PEEP: 5  ITime: 1  MAP: 6  PIP: 18      LABS:                        7.8    10.03 )-----------( 352      ( 19 Oct 2020 05:40 )             24.8     10-19    139  |  96  |  62<H>  ----------------------------<  80  2.7<LL>   |  23  |  8.36<H>    Ca    6.8<L>      19 Oct 2020 05:40  Phos  8.6     10-19  Mg     2.0     10-19    TPro  6.4  /  Alb  1.7<L>  /  TBili  0.2  /  DBili  x   /  AST  25  /  ALT  12  /  AlkPhos  416<H>  10-19        CAPILLARY BLOOD GLUCOSE      POCT Blood Glucose.: 94 mg/dL (19 Oct 2020 05:33)  POCT Blood Glucose.: 96 mg/dL (18 Oct 2020 23:20)  POCT Blood Glucose.: 89 mg/dL (18 Oct 2020 10:47)    ABG - ( 19 Oct 2020 05:41 )  pH, Arterial: 7.48  pH, Blood: x     /  pCO2: 34    /  pO2: 119   / HCO3: 25    / Base Excess: 1.4   /  SaO2: 98                  RADIOLOGY & ADDITIONAL TESTS:    Consultant(s) Notes Reviewed:  [x ] YES  [ ] NO    MEDICATIONS  (STANDING):  chlorhexidine 0.12% Liquid 15 milliLiter(s) Oral Mucosa every 12 hours  fentaNYL   Infusion. 0.5 MICROgram(s)/kG/Hr (2.27 mL/Hr) IV Continuous <Continuous>  influenza   Vaccine 0.5 milliLiter(s) IntraMuscular once  methadone    Tablet 90 milliGRAM(s) Oral daily  Nephro-dianne 1 Tablet(s) Oral daily  norepinephrine Infusion 0.05 MICROgram(s)/kG/Min (4.26 mL/Hr) IV Continuous <Continuous>  propofol Infusion 10 MICROgram(s)/kG/Min (2.72 mL/Hr) IV Continuous <Continuous>  valproate sodium IVPB 500 milliGRAM(s) IV Intermittent every 12 hours  vancomycin    Solution 125 milliGRAM(s) Oral every 6 hours    MEDICATIONS  (PRN):  haloperidol    Injectable 5 milliGRAM(s) IntraMuscular once PRN agitation, combative with vEEG placement  LORazepam   Injectable 2 milliGRAM(s) IV Push every 2 hours PRN Symptom-triggered: each CIWA -Ar score 8 or GREATER  metoclopramide Injectable 5 milliGRAM(s) IV Push every 6 hours PRN Nausea, vomiting      PHYSICAL EXAM:  GENERAL:   HEAD:  Atraumatic, Normocephalic  EYES: EOMI, PERRLA, conjunctiva and sclera clear  NECK: Supple, No JVD, Normal thyroid, no enlarged nodes  NERVOUS SYSTEM:  Alert & Awake.   CHEST/LUNG: B/L good air entry; No rales, rhonchi, or wheezing  HEART: S1S2 normal, no S3, Regular rate and rhythm; No murmurs  ABDOMEN: Soft, Nontender, Nondistended; Bowel sounds present  EXTREMITIES:  2+ Peripheral Pulses, No clubbing, cyanosis, or edema  LYMPH: No lymphadenopathy noted  SKIN: No rashes or lesions    Care Discussed with Consultants/Other Providers [ x] YES  [ ] NO 39M PMH Heroin abuse (on methadone) with spinal cord injury c/b b/l BKA 8-10 years ago now presenting with JEAN PIERRE with hyperkalemia and uremia, complicated by seizure s/p intubation for airway protection      INTERVAL HPI/OVERNIGHT EVENTS:   O/n patient had decrease in core body temp to 96 degrees, bairhugger placed and temp resolved. This AM patient was sedated with no change in physical exam. Goals of Care discussion today with palliative facilitating. Patient is now DNR/DNI and will not be going for HD today.      ICU Vital Signs Last 24 Hrs  T(C): 37.7 (19 Oct 2020 05:00), Max: 38.3 (18 Oct 2020 12:00)  T(F): 99.9 (19 Oct 2020 05:00), Max: 100.9 (18 Oct 2020 12:00)  HR: 96 (19 Oct 2020 08:00) (74 - 124)  BP: 108/69 (19 Oct 2020 08:00) (85/53 - 128/73)  BP(mean): 84 (19 Oct 2020 08:00) (65 - 94)  ABP: 122/59 (19 Oct 2020 08:00) (106/47 - 138/66)  ABP(mean): 76 (19 Oct 2020 08:00) (59 - 84)  RR: 15 (19 Oct 2020 07:00) (8 - 196)  SpO2: 98% (19 Oct 2020 08:00) (89% - 100%)    I&O's Summary    18 Oct 2020 07:01  -  19 Oct 2020 07:00  --------------------------------------------------------  IN: 1728 mL / OUT: 5 mL / NET: 1723 mL    19 Oct 2020 07:01  -  19 Oct 2020 08:43  --------------------------------------------------------  IN: 21.3 mL / OUT: 0 mL / NET: 21.3 mL      Mode: AC/ CMV (Assist Control/ Continuous Mandatory Ventilation)  RR (machine): 15  TV (machine): 400  FiO2: 60  PEEP: 5  ITime: 1  MAP: 6  PIP: 18      LABS:                        7.8    10.03 )-----------( 352      ( 19 Oct 2020 05:40 )             24.8     10-19    139  |  96  |  62<H>  ----------------------------<  80  2.7<LL>   |  23  |  8.36<H>    Ca    6.8<L>      19 Oct 2020 05:40  Phos  8.6     10-19  Mg     2.0     10-19    TPro  6.4  /  Alb  1.7<L>  /  TBili  0.2  /  DBili  x   /  AST  25  /  ALT  12  /  AlkPhos  416<H>  10-19        CAPILLARY BLOOD GLUCOSE      POCT Blood Glucose.: 94 mg/dL (19 Oct 2020 05:33)  POCT Blood Glucose.: 96 mg/dL (18 Oct 2020 23:20)  POCT Blood Glucose.: 89 mg/dL (18 Oct 2020 10:47)    ABG - ( 19 Oct 2020 05:41 )  pH, Arterial: 7.48  pH, Blood: x     /  pCO2: 34    /  pO2: 119   / HCO3: 25    / Base Excess: 1.4   /  SaO2: 98                  RADIOLOGY & ADDITIONAL TESTS:    Consultant(s) Notes Reviewed:  [x ] YES  [ ] NO    MEDICATIONS  (STANDING):  chlorhexidine 0.12% Liquid 15 milliLiter(s) Oral Mucosa every 12 hours  fentaNYL   Infusion. 0.5 MICROgram(s)/kG/Hr (2.27 mL/Hr) IV Continuous <Continuous>  influenza   Vaccine 0.5 milliLiter(s) IntraMuscular once  methadone    Tablet 90 milliGRAM(s) Oral daily  Nephro-dianne 1 Tablet(s) Oral daily  norepinephrine Infusion 0.05 MICROgram(s)/kG/Min (4.26 mL/Hr) IV Continuous <Continuous>  propofol Infusion 10 MICROgram(s)/kG/Min (2.72 mL/Hr) IV Continuous <Continuous>  valproate sodium IVPB 500 milliGRAM(s) IV Intermittent every 12 hours  vancomycin    Solution 125 milliGRAM(s) Oral every 6 hours    MEDICATIONS  (PRN):  haloperidol    Injectable 5 milliGRAM(s) IntraMuscular once PRN agitation, combative with vEEG placement  LORazepam   Injectable 2 milliGRAM(s) IV Push every 2 hours PRN Symptom-triggered: each CIWA -Ar score 8 or GREATER  metoclopramide Injectable 5 milliGRAM(s) IV Push every 6 hours PRN Nausea, vomiting      PHYSICAL EXAM:  General: Cachectic man appearing older than stated age, now sedated in bed  HEENT: EEG leads in place, dilated pupils 5mm bilaterally equal and reactive to light, no nystagmus  Neck: supple, no nuchal rigidity  Cardiovascular: +S1/S2, RRR, No murmurs, rubs, gallops  Respiratory: Decreased breath sounds in all lung fields 2/2 poor inspiratory effort, no W/R/R  Gastrointestinal: soft, nondistended, +BSx4  Extremities: Warm and well perfused, anasarca, b/l BKA  Vascular: 2+ radial pulses B/L   Neurological: EMILY, withdraws from pain, unable to assess remainder    Care Discussed with Consultants/Other Providers [ x] YES  [ ] NO

## 2020-10-19 NOTE — PROGRESS NOTE ADULT - ASSESSMENT
39M with PMHx of HCV (untreated), PSA (K2, IV Heroin, last used 1 week prior to admission, utox also with cocaine and benzos), methadone dependence, spinal cord injury c/b b/l BKA admitted for ARF of unclear etiology with course complicated by seizures, requiring intubation.    Exam today notable     CSF today with 2 total nucl cells, Protein 156, Glucose 45. Protein increase likely nonspecific i/s/o recent seizures.        Suggest:  - c/w Depakote 500 BID, check levels tomorrow am before 4th dose  - obtain brain MRI w and w/o contrast when stable enough for transport  - c/w vEEG  - neurology will follow     39M with PMHx of HCV (untreated), PSA (K2, IV Heroin, last used 1 week prior to admission, utox also with cocaine and benzos), methadone dependence, spinal cord injury c/b b/l BKA admitted for ARF of unclear etiology with course complicated by seizures, requiring intubation.    Exam today notable for present brain stem reflexes but no mental status (exam off sedation).    CSF today with 2 total nucl cells, Protein 156, Glucose 45. Protein increase likely nonspecific i/s/o recent seizures.    Today again with seizure activity on EEG.    For now unclear etiology of new onset seizures.      Suggest:  - c/w Depakote 500 BID, check levels tomorrow am before 4th dose  - c/w vEEG  - as per epilepsy team - started on Keppra 1g BID and Ketamine  - epilepsy team will assume care, general neurology signing off   39M with PMHx of HCV (untreated), PSA (K2, IV Heroin, last used 1 week prior to admission, utox also with cocaine and benzos), methadone dependence, spinal cord injury c/b b/l BKA admitted for ARF of unclear etiology with course complicated by seizures, requiring intubation.    Exam today notable for present brain stem reflexes but no mental status (exam off sedation).    CSF today with 2 total nucl cells, Protein 156, Glucose 45. Protein increase likely nonspecific i/s/o recent seizures.    Today again with seizure activity on EEG.    For now, unclear etiology of new onset seizures.      Suggest:  - c/w Depakote 500 BID, check levels tomorrow am before 4th dose  - c/w vEEG  - as per epilepsy team - started on Keppra 1g BID and Ketamine  - brain MRI w and wo when stabilized.  - epilepsy team was consulted today after multiple seizures seen on EEG, they will likely continue following.

## 2020-10-19 NOTE — PROCEDURE NOTE - NSINDICATIONS_GEN_A_CORE
critical illness/emergency venous access/venous access
emergency venous access
CSF sampling/suspected CNS infection
feeding tube replacement

## 2020-10-20 NOTE — CONSULT NOTE ADULT - PROVIDER SPECIALTY LIST ADULT
Epilepsy
Nephrology
Palliative Care
Critical Care
Gastroenterology
Heme/Onc
Vascular Surgery
Neurology
Intervent Radiology

## 2020-10-20 NOTE — PROGRESS NOTE ADULT - ATTENDING COMMENTS
I independently performed the key portions of the evaluation and management service provided. I agree with the above history, physical, and plan which I have reviewed and edited where appropriate. I find JEAN PIERRE, hypotension, seizure.  Will plan on CVVHD vs HD tomorrow. Keep MAP > 65.  Suspect possible benzo withdrawal seizure. See full note. (Patient seen earlier in day.) .

## 2020-10-20 NOTE — PROGRESS NOTE ADULT - SUBJECTIVE AND OBJECTIVE BOX
***INCOMPLETE***  38 yo M w/ PMH Heroin abuse (on methadone) with spinal cord injury c/b b/l BKA 8-10 years ago now presenting with JEAN PIERRE with hyperkalemia and uremia, complicated by AMS encephalopathy with  intractable seizures s/p intubation for airway protection and transfer to MICU for further care.     INTERVAL HPI/OVERNIGHT EVENTS:   O/n the valproic acid level resulted as subtherapeutic. Per Dr. Thomas of epilepsy, increased ketamine from 0.5 to 1 at 9pm, and per neurology note started lacosamide 100mg BID w/one time 100mg loading dose. K repleted w/20 meq as 3.1. This AM patient was having abdominal twitching with roving eyes and was unresponsive and not redirectable. Epilepsy called to confirm seizure activity on EEG, fentanyl discontinued, dropped Ketamine back to 0.5, gave a 10 push of versed and started versed drip at 10 ml/hr and later increased to 20 ml/hr, increased lacosamide to 200 mg BID, Keppra 1000 mg daily, Valproate 1000 mg BID, Phenobarbital 65 BID, and propofol 14.7 ml/hr drip. Patient continued to seize throughout the afternoon as these adjustments were made, physical seizure activity finally stopped shortly after Phenobarbital was initiated. Patient was waiting for MRI to assess encephalopathy but cannot obtain 2/2 number of drips patient currently needs, will have CTH at bedside in AM.    ICU Vital Signs Last 24 Hrs  T(C): 37.8 (20 Oct 2020 14:00), Max: 37.9 (20 Oct 2020 10:00)  T(F): 100.1 (20 Oct 2020 14:00), Max: 100.3 (20 Oct 2020 10:00)  HR: 90 (20 Oct 2020 16:00) (86 - 112)  BP: 99/58 (20 Oct 2020 16:00) (91/53 - 109/69)  BP(mean): 74 (20 Oct 2020 16:00) (66 - 83)  ABP: 118/42 (20 Oct 2020 16:00) (100/35 - 132/49)  ABP(mean): 60 (20 Oct 2020 16:00) (50 - 83)  RR: 15 (20 Oct 2020 16:00) (15 - 21)  SpO2: 100% (20 Oct 2020 16:00) (90% - 100%)    I&O's Summary    19 Oct 2020 07:01  -  20 Oct 2020 07:00  --------------------------------------------------------  IN: 1761.2 mL / OUT: 115 mL / NET: 1646.2 mL    20 Oct 2020 07:01  -  20 Oct 2020 17:10  --------------------------------------------------------  IN: 1464.8 mL / OUT: 0 mL / NET: 1464.8 mL      Mode: AC/ CMV (Assist Control/ Continuous Mandatory Ventilation)  RR (machine): 15  TV (machine): 400  FiO2: 30  PEEP: 5  ITime: 1  MAP: 10  PIP: 19      LABS:                        7.3    9.70  )-----------( 367      ( 20 Oct 2020 05:22 )             23.2     10-20    138  |  98  |  62<H>  ----------------------------<  81  3.4<L>   |  19<L>  |  8.40<H>    Ca    6.4<LL>      20 Oct 2020 05:22  Phos  7.6     10-20  Mg     1.8     10-20    TPro  5.8<L>  /  Alb  1.5<L>  /  TBili  0.2  /  DBili  x   /  AST  21  /  ALT  11  /  AlkPhos  349<H>  10-20        CAPILLARY BLOOD GLUCOSE      POCT Blood Glucose.: 84 mg/dL (20 Oct 2020 12:26)  POCT Blood Glucose.: 60 mg/dL (20 Oct 2020 11:16)  POCT Blood Glucose.: 86 mg/dL (19 Oct 2020 17:13)    ABG - ( 19 Oct 2020 05:41 )  pH, Arterial: 7.48  pH, Blood: x     /  pCO2: 34    /  pO2: 119   / HCO3: 25    / Base Excess: 1.4   /  SaO2: 98                  RADIOLOGY & ADDITIONAL TESTS:    Consultant(s) Notes Reviewed:  [x ] YES  [ ] NO    MEDICATIONS  (STANDING):  acyclovir IVPB 200 milliGRAM(s) IV Intermittent every 24 hours  albumin human 25% IVPB 50 milliLiter(s) IV Intermittent every 1 hour  artificial  tears Solution 1 Drop(s) Both EYES every 12 hours  cefTRIAXone   IVPB 2000 milliGRAM(s) IV Intermittent every 12 hours  chlorhexidine 0.12% Liquid 15 milliLiter(s) Oral Mucosa every 12 hours  chlorhexidine 2% Cloths 1 Application(s) Topical <User Schedule>  dextrose 5%. 1000 milliLiter(s) (50 mL/Hr) IV Continuous <Continuous>  dextrose 50% Injectable 12.5 Gram(s) IV Push once  dextrose 50% Injectable 25 Gram(s) IV Push once  dextrose 50% Injectable 25 Gram(s) IV Push once  fentaNYL   Infusion. 0.5 MICROgram(s)/kG/Hr (2.27 mL/Hr) IV Continuous <Continuous>  heparin   Injectable 5000 Unit(s) SubCutaneous every 12 hours  influenza   Vaccine 0.5 milliLiter(s) IntraMuscular once  insulin lispro (ADMELOG) corrective regimen sliding scale   SubCutaneous every 6 hours  ketamine Infusion 0.5 mG/kG/Hr (2.05 mL/Hr) IV Continuous <Continuous>  lacosamide IVPB 200 milliGRAM(s) IV Intermittent every 12 hours  levETIRAcetam  IVPB 1000 milliGRAM(s) IV Intermittent every 24 hours  methadone    Tablet 90 milliGRAM(s) Oral daily  midazolam Infusion. 0.489 mG/kG/Hr (20 mL/Hr) IV Continuous <Continuous>  Nephro-dianne 1 Tablet(s) Oral daily  norepinephrine Infusion 0.05 MICROgram(s)/kG/Min (3.83 mL/Hr) IV Continuous <Continuous>  pantoprazole  Injectable 40 milliGRAM(s) IV Push daily  PHENobarbital Injectable 65 milliGRAM(s) IV Push every 12 hours  propofol Infusion. 60 MICROgram(s)/kG/Min (14.7 mL/Hr) IV Continuous <Continuous>  valproate sodium IVPB 1000 milliGRAM(s) IV Intermittent every 12 hours  vancomycin    Solution 125 milliGRAM(s) Oral every 6 hours    MEDICATIONS  (PRN):  dextrose 40% Gel 15 Gram(s) Oral once PRN Blood Glucose LESS THAN 70 milliGRAM(s)/deciliter  glucagon  Injectable 1 milliGRAM(s) IntraMuscular once PRN Glucose LESS THAN 70 milligrams/deciliter  petrolatum Ophthalmic Ointment 1 Application(s) Left EYE every 4 hours PRN dry eye    PHYSICAL EXAM:  General: Cachectic man appearing older than stated age, sedated  HEENT: EEG leads in place, dilated pupils 5mm bilaterally equal and reactive to light, no nystagmus  Neck: supple, no nuchal rigidity  Cardiovascular: +S1/S2, RRR, No murmurs, rubs, gallops  Respiratory: Decreased breath sounds in all lung fields, no W/R/R  Gastrointestinal: soft, scaphoid, nondistended, liekly NT as no change in vitals on deep palpation, +BSx4  Extremities: Warm and well perfused, edema of lower extremities, L>R, b/l BKA with dressing in place  Vascular: 2+ radial pulses B/L, 1+ popliteal   Neurological: withdraws from pain, no apparent blink to confrontation. Shortly after AM exam patient was status epilepticus with jerking of his shoulders, abdomen, and jaw, with roving eyes. EEG in place and being monitored    Care Discussed with Consultants/Other Providers [ x] YES  [ ] NO 38 yo M w/ PMH Heroin abuse (on methadone) with spinal cord injury c/b b/l BKA 8-10 years ago now presenting with JEAN PIERRE with hyperkalemia and uremia, complicated by AMS encephalopathy with  intractable seizures s/p intubation for airway protection and transfer to MICU for further care.     INTERVAL HPI/OVERNIGHT EVENTS:   O/n the valproic acid level resulted as subtherapeutic. Per Dr. Thomas of epilepsy, increased ketamine from 0.5 to 1 at 9pm, and per neurology note started lacosamide 100mg BID w/one time 100mg loading dose. K repleted w/20 meq as 3.1. This AM patient was having abdominal twitching with roving eyes and was unresponsive and not redirectable. Epilepsy called to confirm seizure activity on EEG, fentanyl discontinued, dropped Ketamine back to 0.5, gave a 10 push of versed and started versed drip at 10 ml/hr and later increased to 20 ml/hr, increased lacosamide to 200 mg BID, Keppra 1000 mg daily, Valproate 1000 mg BID, Phenobarbital 65 BID, Magnesium 4 g, and propofol 60 mcg/kg/hr or 14.7 ml/hr drip. Patient continued to seize throughout the afternoon as these adjustments were made, physical seizure activity finally stopped shortly after Phenobarbital was initiated. Patient was waiting for MRI to assess encephalopathy but cannot obtain 2/2 number of drips patient currently needs, will have CTH at bedside in AM.    ICU Vital Signs Last 24 Hrs  T(C): 37.8 (20 Oct 2020 14:00), Max: 37.9 (20 Oct 2020 10:00)  T(F): 100.1 (20 Oct 2020 14:00), Max: 100.3 (20 Oct 2020 10:00)  HR: 90 (20 Oct 2020 16:00) (86 - 112)  BP: 99/58 (20 Oct 2020 16:00) (91/53 - 109/69)  BP(mean): 74 (20 Oct 2020 16:00) (66 - 83)  ABP: 118/42 (20 Oct 2020 16:00) (100/35 - 132/49)  ABP(mean): 60 (20 Oct 2020 16:00) (50 - 83)  RR: 15 (20 Oct 2020 16:00) (15 - 21)  SpO2: 100% (20 Oct 2020 16:00) (90% - 100%)    I&O's Summary    19 Oct 2020 07:01  -  20 Oct 2020 07:00  --------------------------------------------------------  IN: 1761.2 mL / OUT: 115 mL / NET: 1646.2 mL    20 Oct 2020 07:01  -  20 Oct 2020 17:10  --------------------------------------------------------  IN: 1464.8 mL / OUT: 0 mL / NET: 1464.8 mL      Mode: AC/ CMV (Assist Control/ Continuous Mandatory Ventilation)  RR (machine): 15  TV (machine): 400  FiO2: 30  PEEP: 5  ITime: 1  MAP: 10  PIP: 19      LABS:                        7.3    9.70  )-----------( 367      ( 20 Oct 2020 05:22 )             23.2     10-20    138  |  98  |  62<H>  ----------------------------<  81  3.4<L>   |  19<L>  |  8.40<H>    Ca    6.4<LL>      20 Oct 2020 05:22  Phos  7.6     10-20  Mg     1.8     10-20    TPro  5.8<L>  /  Alb  1.5<L>  /  TBili  0.2  /  DBili  x   /  AST  21  /  ALT  11  /  AlkPhos  349<H>  10-20        CAPILLARY BLOOD GLUCOSE      POCT Blood Glucose.: 84 mg/dL (20 Oct 2020 12:26)  POCT Blood Glucose.: 60 mg/dL (20 Oct 2020 11:16)  POCT Blood Glucose.: 86 mg/dL (19 Oct 2020 17:13)    ABG - ( 19 Oct 2020 05:41 )  pH, Arterial: 7.48  pH, Blood: x     /  pCO2: 34    /  pO2: 119   / HCO3: 25    / Base Excess: 1.4   /  SaO2: 98                  RADIOLOGY & ADDITIONAL TESTS:    Consultant(s) Notes Reviewed:  [x ] YES  [ ] NO    MEDICATIONS  (STANDING):  acyclovir IVPB 200 milliGRAM(s) IV Intermittent every 24 hours  albumin human 25% IVPB 50 milliLiter(s) IV Intermittent every 1 hour  artificial  tears Solution 1 Drop(s) Both EYES every 12 hours  cefTRIAXone   IVPB 2000 milliGRAM(s) IV Intermittent every 12 hours  chlorhexidine 0.12% Liquid 15 milliLiter(s) Oral Mucosa every 12 hours  chlorhexidine 2% Cloths 1 Application(s) Topical <User Schedule>  dextrose 5%. 1000 milliLiter(s) (50 mL/Hr) IV Continuous <Continuous>  dextrose 50% Injectable 12.5 Gram(s) IV Push once  dextrose 50% Injectable 25 Gram(s) IV Push once  dextrose 50% Injectable 25 Gram(s) IV Push once  fentaNYL   Infusion. 0.5 MICROgram(s)/kG/Hr (2.27 mL/Hr) IV Continuous <Continuous>  heparin   Injectable 5000 Unit(s) SubCutaneous every 12 hours  influenza   Vaccine 0.5 milliLiter(s) IntraMuscular once  insulin lispro (ADMELOG) corrective regimen sliding scale   SubCutaneous every 6 hours  ketamine Infusion 0.5 mG/kG/Hr (2.05 mL/Hr) IV Continuous <Continuous>  lacosamide IVPB 200 milliGRAM(s) IV Intermittent every 12 hours  levETIRAcetam  IVPB 1000 milliGRAM(s) IV Intermittent every 24 hours  methadone    Tablet 90 milliGRAM(s) Oral daily  midazolam Infusion. 0.489 mG/kG/Hr (20 mL/Hr) IV Continuous <Continuous>  Nephro-dianne 1 Tablet(s) Oral daily  norepinephrine Infusion 0.05 MICROgram(s)/kG/Min (3.83 mL/Hr) IV Continuous <Continuous>  pantoprazole  Injectable 40 milliGRAM(s) IV Push daily  PHENobarbital Injectable 65 milliGRAM(s) IV Push every 12 hours  propofol Infusion. 60 MICROgram(s)/kG/Min (14.7 mL/Hr) IV Continuous <Continuous>  valproate sodium IVPB 1000 milliGRAM(s) IV Intermittent every 12 hours  vancomycin    Solution 125 milliGRAM(s) Oral every 6 hours    MEDICATIONS  (PRN):  dextrose 40% Gel 15 Gram(s) Oral once PRN Blood Glucose LESS THAN 70 milliGRAM(s)/deciliter  glucagon  Injectable 1 milliGRAM(s) IntraMuscular once PRN Glucose LESS THAN 70 milligrams/deciliter  petrolatum Ophthalmic Ointment 1 Application(s) Left EYE every 4 hours PRN dry eye    PHYSICAL EXAM:  General: Cachectic man appearing older than stated age, sedated  HEENT: EEG leads in place, dilated pupils 5mm bilaterally equal and reactive to light, no nystagmus  Neck: supple, no nuchal rigidity  Cardiovascular: +S1/S2, RRR, No murmurs, rubs, gallops  Respiratory: Decreased breath sounds in all lung fields, no W/R/R  Gastrointestinal: soft, scaphoid, nondistended, liekly NT as no change in vitals on deep palpation, +BSx4  Extremities: Warm and well perfused, edema of lower extremities, L>R, b/l BKA with dressing in place  Vascular: 2+ radial pulses B/L, 1+ popliteal   Neurological: withdraws from pain, no apparent blink to confrontation. Shortly after AM exam patient was status epilepticus with jerking of his shoulders, abdomen, and jaw, with roving eyes. EEG in place and being monitored    Care Discussed with Consultants/Other Providers [ x] YES  [ ] NO

## 2020-10-20 NOTE — PROGRESS NOTE ADULT - SUBJECTIVE AND OBJECTIVE BOX
Patient is a 39y Male seen and evaluated at bedside. In status epilepticus. GoC discussion held- DNR/DNI. Remains on pressors and multiple antiepileptics. Anuric, electrolyte derrangements.    Meds:    acyclovir IVPB 200 every 24 hours  albumin human 25% IVPB 50 every 1 hour  artificial  tears Solution 1 every 12 hours  cefTRIAXone   IVPB 2000 every 12 hours  chlorhexidine 0.12% Liquid 15 every 12 hours  chlorhexidine 2% Cloths 1 <User Schedule>  dextrose 40% Gel 15 once PRN  dextrose 5%. 1000 <Continuous>  dextrose 50% Injectable 12.5 once  dextrose 50% Injectable 25 once  dextrose 50% Injectable 25 once  fentaNYL   Infusion. 0.5 <Continuous>  fosphenytoin IVPB 800 once  glucagon  Injectable 1 once PRN  heparin   Injectable 5000 every 12 hours  influenza   Vaccine 0.5 once  insulin lispro (ADMELOG) corrective regimen sliding scale  every 6 hours  ketamine Infusion 0.5 <Continuous>  lacosamide IVPB 200 every 12 hours  levETIRAcetam  IVPB 1000 every 24 hours  magnesium sulfate  IVPB 4 once  methadone    Tablet 90 daily  midazolam Infusion. 0.489 <Continuous>  Nephro-dianne 1 daily  norepinephrine Infusion 0.05 <Continuous>  pantoprazole  Injectable 40 daily  petrolatum Ophthalmic Ointment 1 every 4 hours PRN  PHENobarbital Injectable 65 every 12 hours  PHENobarbital IVPB 400 once  potassium chloride   Powder 40 once  propofol Infusion. 60 <Continuous>  valproate sodium IVPB 1000 every 12 hours  vancomycin    Solution 125 every 6 hours      T(C): , Max: 37.9 (10-20-20 @ 10:00)  T(F): , Max: 100.3 (10-20-20 @ 10:00)  HR: 92 (10-20-20 @ 14:00)  BP: 105/55 (10-20-20 @ 14:00)  BP(mean): 73 (10-20-20 @ 14:00)  RR: 18 (10-20-20 @ 14:00)  SpO2: 94% (10-20-20 @ 14:00)  Wt(kg): --    10-19 @ 07:01  -  10-20 @ 07:00  --------------------------------------------------------  IN: 1761.2 mL / OUT: 115 mL / NET: 1646.2 mL    10-20 @ 07:01  -  10-20 @ 14:13  --------------------------------------------------------  IN: 894.4 mL / OUT: 0 mL / NET: 894.4 mL    Review of Systems: Unable to assess    PHYSICAL EXAM:  GENERAL: Intubated, sedated, seizing  CHEST/LUNG: Clear to auscultation bilaterally  HEART: normal S1S2, RRR  ABDOMEN: Soft, Nontender, non distended  EXTREMITIES: B/l amputations, Left LE oedema- non pitting  ACCESS: Right IJ temp cath     LABS:                        7.3    9.70  )-----------( 367      ( 20 Oct 2020 05:22 )             23.2     10-20    138  |  98  |  62<H>  ----------------------------<  81  3.4<L>   |  19<L>  |  8.40<H>    Ca    6.4<LL>      20 Oct 2020 05:22  Phos  7.6     10-20  Mg     1.8     10-20    TPro  5.8<L>  /  Alb  1.5<L>  /  TBili  0.2  /  DBili  x   /  AST  21  /  ALT  11  /  AlkPhos  349<H>  10-20                RADIOLOGY & ADDITIONAL STUDIES:

## 2020-10-20 NOTE — PROGRESS NOTE ADULT - PROBLEM SELECTOR PLAN 1
CT head negative on 10/17 following episode of AMS. EEG placed morning of 10/18, positive for tonic clonic seizure at 9am. S/p intubation 10/18 for airway protection in setting of seizure and transfer to MICU. S/P Propofol, versed, and fentanyl for sedation with intubation.   -Appreciate MICU, Neurology management:  - began seizing this morning 10/20, required constant titration of sedatives/antiepileptics with eventual cessation of physical seizure activity after introduction of phenobarbital.  - Patient will have bedside CTH in AM to assess, cannot go for MRI 2/2 multiple drips required.  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

## 2020-10-20 NOTE — PROGRESS NOTE ADULT - ASSESSMENT
39M with PMHx of chronic hepatitis C (untreated), anxiety, polysubstance abuse (K2, IV Heroin, last used 1 week prior to admission, utox also with cocaine and benzos), methadone dependence, spinal cord injury c/b b/l BKA 8-10 years ago, undomiciled recently incarcerated 6 months ago, presented on 10/12 with nausea and decreased PO intake x 2 months, weight loss of 50 lbs, and worsening generalized fatigue x 2 weeks. Admitted for acute renal failure, uremia and hyperkalemia. S/p hemodialysis and pending further workup for nephropathy. Course c/b C. diff infection and intractable seizures.      NEURO:  #Seizure of unknown origin, intractable, status epilepticus.  CT head negative on 10/17 following episode of AMS. EEG placed morning of 10/18, positive for tonic clonic seizure at 9am. S/p intubation 10/18 for airway protection in setting of seizure and transfer to MICU. S/P Propofol, versed, and fentanyl for sedation with intubation. Patient began seizing this AM, 10/20, and has required constant titration of sedatives/antiepileptics with eventual cessation of physical seizure activity after introduction of phenobarbital.  - Patient will have bedside CTH in AM to assess, cannot go for MRI 2/2 multiple drips required.  - Lacosamide 200 BID, Keppra 1000 mg daily, Valproate 1000 mg BID, Versed drip .489 mg/kg/hr or 20 ml/hr, Phenobarbital 65 mg BID, Propofol 60 mcg/kg/min or 14.7 mL/hr,  - Levophed started for pressure support at   - EDIT: patient with seizure activity again at 5 pm, restarted ketamine drip at 1 mg/kg/hr or 4 ml/hr  - started Acyclovir 200 daily for CNS coverage, out of concern for HSV encephalitis  - started Ceftriaxone 2000 mg daily for bacterial meningitis coverage    #Altered mental status, CT head negative on 10/17. Undergoing workup for underlying cause of seizure including worsening infection. LP only positive for RBCs but clear in color, culture negative.  - Patient will have bedside CTH in AM to assess, cannot go for MRI 2/2 multiple drips required.  - started Acyclovir 200 daily for CNS coverage, out of concern for HSV encephalitis  - started Ceftriaxone 2000 mg daily for bacterial meningitis coverage    PULM:  Patient intubated for airway protection  - sedated on regimen for seizure control, see above  - AC/CMV with FiO2 40%, PEEP 5, and RR 15    CARDIO:  Elevated troponin i/s/o ARF with no ischemic changes on EKG and elevated proBNP of 7428. Likely demand ischemia  -Trops measured twice at 0.23 but not trended, f/u trop with AM lab for resolution  -TTE normal with EF 64%.  -levophed for BP support while sedated with goal MAP 60     GI:  #C Diff positive, xray and CT negative for malignancy or obstruction  - c/w vanc oral solution 125 mg Q6  - continue contact precautions.     #Elevated alkaline phosphatase level, i/s/o elevated GGT and chronic Hep C, possible intrahepatic cholestasis  - Alk phos continues to be elevated, today 527  - Bilirubin level has been normal since admission  - RUQ US negative for biliary pathology, bile ducts not dilated, echogenic portal triads consistent with chronic hepC infection  - Sent multiple myeloma workup  - continue to trend Alkphos and monitor for any symptoms.     # Chronic Hepatitis C, with hep B and HIV neg, RUQ US shows echogenic portal triads  - Elevated Alkphos, AST/ALT wnl   - elev   - continue to trend LFTs, management as per above.    ENDO:  Last A1c 5.5, ISS with continuous tube feeds    RENAL:  JEAN PIERRE with associated hyperkalemia to 5.4 and hyperuricemia and hyperphosphatemia on admission with EKG showing peaked T waves isolate in V4. Now resolved s/p HD and s/p Rasburicase 6mg x1 on 10/14. Urine lytes consistent with post-obstructive JEAN PIERRE, biclonal gammopathy present, and renal US showed Mild right calyectasis without dilatation of the renal pelvis and medical renal disease.   - Nephro following, unclear etiology, will follow recs  - Patient to go for HD today  - Trend Cr, Uric Acid, Phosphate  - reglan PRN nausea, vomiting  - Lead level wnl for environmental exposure  - Repeat UTox    ID:  LLE cellulitis 2/2 ulceration. s/p bilateral BKA, RLE w/ probable chronic limb ischemia and LLE concerning for cellulitis  - LE dopplers r/o DVT   - s/p vancomycin and zosyn in the ED, plus Vanc 1000 mg IV one time 10/18      #UTI   - UA+ but no UCx resulted  - F/U UCx    HEME:  #Normocytic anemia most likely 2/2 ACD vs ALEX in setting of renal dysfunction. No signs of acute blood loss.   - B12 elevated 1793 and Folate 13, iron studies consistent with mixed picture ALEX and ACD   - Transfuse Hg<7  - Maintain active Type and Screen.     OTHER:  #Lice found on head and pubic hair on admission to MICU, EEG removed for treatment  -permethrin 5% cream to all hair    #Polysubstance abuse with recent incarceration, endorses cocaine, heroin (5 days ago) and K2 use. Now on methadone 90mg qd.   - Utox on admission positive for opioids, cocaine, benzos and methadone   - confirmed methadone dosing 90mg qd - [phone] [facility] [address], [place] - [id number]  - C/w methadone 90mg qd  - Avoid beta blockers  - HIV neg, positive Hep C antibody   - TB quant negative   - Monitor for signs of withdrawal, on CIWA protocol for now  - patient endorses IV drug use with no specific location of use, continue to treat patient with Vancomycin and Ceftriaxone for endocarditis prophylaxis in the setting of cellulitis as stated above.     ACCESS/LINES:  2 Peripheral IV lines, L sided Central line, A line      PREVENTATIVE:  F: Replete PRN  E: Replete K<4, Mg<2  N: NGT  DVT PPx: Heparin 5000 SubQ Q12  GI PPx: None  DISPO: MICU    FULL CODE           39M with PMHx of chronic hepatitis C (untreated), anxiety, polysubstance abuse (K2, IV Heroin, last used 1 week prior to admission, utox also with cocaine and benzos), methadone dependence, spinal cord injury c/b b/l BKA 8-10 years ago, undomiciled recently incarcerated 6 months ago, presented on 10/12 with nausea and decreased PO intake x 2 months, weight loss of 50 lbs, and worsening generalized fatigue x 2 weeks. Admitted for acute renal failure, uremia and hyperkalemia. S/p hemodialysis and pending further workup for nephropathy. Course c/b C. diff infection and intractable seizures.      NEURO:  #Seizure of unknown origin, intractable, status epilepticus.  CT head negative on 10/17 following episode of AMS. EEG placed morning of 10/18, positive for tonic clonic seizure at 9am. S/p intubation 10/18 for airway protection in setting of seizure and transfer to MICU. S/P Propofol, versed, and fentanyl for sedation with intubation. Patient began seizing this AM, 10/20, and has required constant titration of sedatives/antiepileptics with eventual cessation of physical seizure activity after introduction of phenobarbital.  - Patient will have bedside CTH in AM to assess, cannot go for MRI 2/2 multiple drips required.  - Lacosamide 200 BID, Keppra 1000 mg daily, Valproate 1000 mg BID, Versed drip .489 mg/kg/hr or 20 ml/hr, Phenobarbital 65 mg BID, Propofol 60 mcg/kg/min or 14.7 mL/hr, Magnesium 4 g, fentanyl 1.956 mg/kg/hr or 8  - Levophed started for pressure support at 0.588 mcg/kg/min currently, adjusted for MAP 60   - EDIT: patient with seizure activity again at 5 pm, restarted ketamine drip at 1 mg/kg/hr or 4.09 ml/hr  - started Acyclovir 200 daily for CNS coverage, out of concern for HSV encephalitis  - started Ceftriaxone 2000 mg daily for bacterial meningitis coverage    #Altered mental status, CT head negative on 10/17. Undergoing workup for underlying cause of seizure including worsening infection. LP only positive for RBCs but clear in color, culture negative.  - Patient will have bedside CTH in AM to assess, cannot go for MRI 2/2 multiple drips required.  - started Acyclovir 200 daily for CNS coverage, out of concern for HSV encephalitis  - started Ceftriaxone 2000 mg daily for bacterial meningitis coverage    PULM:  Patient intubated for airway protection  - sedated on regimen for seizure control, see above  - AC/CMV with FiO2 40%, PEEP 5, and RR 15    CARDIO:  Elevated troponin i/s/o ARF with no ischemic changes on EKG and elevated proBNP of 7428. Likely demand ischemia  -Trops measured twice at 0.23 but not trended, f/u trop with AM lab for resolution  -TTE normal with EF 64%.  -levophed for BP support while sedated with goal MAP 60     GI:  #C Diff positive, xray and CT negative for malignancy or obstruction  - c/w vanc oral solution 125 mg Q6  - continue contact precautions.     #Elevated alkaline phosphatase level, i/s/o elevated GGT and chronic Hep C, possible intrahepatic cholestasis  - Alk phos continues to be elevated, today 527  - Bilirubin level has been normal since admission  - RUQ US negative for biliary pathology, bile ducts not dilated, echogenic portal triads consistent with chronic hepC infection  - Sent multiple myeloma workup  - continue to trend Alkphos and monitor for any symptoms.     # Chronic Hepatitis C, with hep B and HIV neg, RUQ US shows echogenic portal triads  - Elevated Alkphos, AST/ALT wnl   - elev   - continue to trend LFTs, management as per above.    ENDO:  Last A1c 5.5, ISS with continuous tube feeds    RENAL:  #ARF with uraemia, hyperphosphataemia, and increased uric acid. Oliguric/anuric, likely 2/2 acute urate nephropathy  - renal saw today and did not recommend HD  - pt with nephrotic range proteinuria, r/o GN and paraproteinemia per Nephro  - Trend Cr, Uric Acid, Phosphate  - reglan PRN nausea, vomiting  - Lead level wnl for environmental exposure  - Repeat UTox    #HAGMA with metabolic alkalosis likely 2/2 renal failure and C Diff volume loss    ID:  LLE cellulitis 2/2 ulceration. s/p bilateral BKA, RLE w/ probable chronic limb ischemia and LLE concerning for cellulitis  - LE dopplers r/o DVT   - s/p vancomycin and zosyn in the ED, plus Vanc 1000 mg IV one time 10/18    #UTI   - UA+ but no UCx resulted  - F/U UCx    HEME:  #Normocytic anemia most likely 2/2 ACD vs ALEX in setting of renal dysfunction. No signs of acute blood loss.   - B12 elevated 1793 and Folate 13, iron studies consistent with mixed picture ALEX and ACD   - Transfuse Hg<7  - Maintain active Type and Screen.     SKIN:  #Lice found on head and pubic hair on admission to MICU, EEG removed for treatment  -permethrin 5% cream to all hair    PSYCHOSOCIAL:  #Polysubstance abuse with recent incarceration, endorses cocaine, heroin (5 days ago) and K2 use. Now on methadone 90mg qd.   - Utox on admission positive for opioids, cocaine, benzos and methadone   - confirmed methadone dosing 90mg qd - [phone] [facility] [address], [place] - [phone]  - C/w methadone 90mg qd  - Avoid beta blockers  - HIV neg, positive Hep C antibody   - TB quant negative   - Monitor for signs of withdrawal, on CIWA protocol for now  - patient endorses IV drug use with no specific location of use, continue to treat patient with Vancomycin and Ceftriaxone for endocarditis prophylaxis in the setting of cellulitis as stated above.     ACCESS/LINES:  2 Peripheral IV lines, L sided Central line, A line      PREVENTATIVE:  F: Replete PRN  E: Replete K<4, Mg<2  N: NGT with continuous enteral feeds  DVT PPx: Heparin 5000 SubQ Q12  GI PPx: None  DISPO: MICU    FULL CODE

## 2020-10-20 NOTE — PROGRESS NOTE ADULT - PROBLEM SELECTOR PLAN 3
with uraemia, hyperphosphataemia, and increased uric acid. Oliguric/anuric, likely 2/2 acute urate nephropathy  -Appreciate MICU, Renal management   - renal saw today and did not recommend HD  - pt with nephrotic range proteinuria, r/o GN and paraproteinemia per Nephro  - Trend Cr, Uric Acid, Phosphate  - reglan PRN nausea, vomiting  - Lead level wnl for environmental exposure  - Repeat UTox

## 2020-10-20 NOTE — CONSULT NOTE ADULT - SUBJECTIVE AND OBJECTIVE BOX
EPILEPSY CONSULT NOTE:  HPI:  39M Undomiciled PMH Heroin abuse (on methadone) with spinal cord injury c/b b/l BKA 8-10 years ago ([facility]) now presents with worsening fatigue for the last 2 weeks, in the setting of 2 months of nausea and vomiting. Denies fever, chest pain, changes in mental status, cough, hemoptysis, weight loss, night sweats. Of note patient reports that he was recently incarcerated for 6 months and was released in July of this year, he reports that he had blood work done and there was no mention of renal dysfunction. He does not follow up with a primary care physician and does not take any medication other than methadone [phone]. He last used heroin 5 days ago but is making a concerted effort to stop using.      In the ED VS T 98 HR 99 /76 R16 96% on RA  Labs: WBC 7 | Hg/Hct 11/35 MCV 77 | Plt 577 | Na 140 K 5.4 | Co2 31 AG 33 BUN/ Cr 192/9.92 | TP/ Alb 8.8/ 2/2 | AST/ALT 22/10  |  EKG: NSR Qtc 468 peaked T waves in V3-V5 unknown baseline, LVH.  CXR - no focal consolidations    ED Course: Famotidine 20, Zofran 4 mg and 2L NS. Nephrology and Vacular Surgery consulted      Epilepsy risk factors:  Head injury with subsequent LOC?: Unable to obtain  Febrile seizures in infancy?: Unable to obtain  Hx of CNS infection?: Unable to obtain  Family hx of epilepsy?: Unable to obtain  Known CNS pathology?: Unable to obtain     Review of Systems:  Unable to obtain 2/2 medically induced coma    PAST MEDICAL & SURGICAL HISTORY:  Drug abuse  K2  Heroin abuse  Above-knee amputation of right lower extremity  Above-knee amputation of left lower extremity     Allergies  No Known Allergies     MEDICATIONS  (STANDING):  acyclovir IVPB 200 milliGRAM(s) IV Intermittent every 24 hours  albumin human 25% IVPB 50 milliLiter(s) IV Intermittent every 1 hour  artificial  tears Solution 1 Drop(s) Both EYES every 12 hours  cefTRIAXone   IVPB 2000 milliGRAM(s) IV Intermittent every 12 hours  chlorhexidine 0.12% Liquid 15 milliLiter(s) Oral Mucosa every 12 hours  chlorhexidine 2% Cloths 1 Application(s) Topical <User Schedule>  dextrose 5%. 1000 milliLiter(s) (50 mL/Hr) IV Continuous <Continuous>  dextrose 50% Injectable 12.5 Gram(s) IV Push once  dextrose 50% Injectable 25 Gram(s) IV Push once  dextrose 50% Injectable 25 Gram(s) IV Push once  fentaNYL   Infusion. 0.5 MICROgram(s)/kG/Hr (2.27 mL/Hr) IV Continuous <Continuous>  heparin   Injectable 5000 Unit(s) SubCutaneous every 12 hours  influenza   Vaccine 0.5 milliLiter(s) IntraMuscular once  insulin lispro (ADMELOG) corrective regimen sliding scale   SubCutaneous every 6 hours  ketamine Infusion 0.5 mG/kG/Hr (2.05 mL/Hr) IV Continuous <Continuous>  lacosamide IVPB 200 milliGRAM(s) IV Intermittent every 12 hours  levETIRAcetam  IVPB 1000 milliGRAM(s) IV Intermittent every 24 hours  methadone    Tablet 90 milliGRAM(s) Oral daily  midazolam Infusion. 0.489 mG/kG/Hr (20 mL/Hr) IV Continuous <Continuous>  Nephro-dianne 1 Tablet(s) Oral daily  norepinephrine Infusion 0.05 MICROgram(s)/kG/Min (3.83 mL/Hr) IV Continuous <Continuous>  pantoprazole  Injectable 40 milliGRAM(s) IV Push daily  PHENobarbital Injectable 65 milliGRAM(s) IV Push every 12 hours  propofol Infusion. 60 MICROgram(s)/kG/Min (14.7 mL/Hr) IV Continuous <Continuous>  valproate sodium IVPB 1000 milliGRAM(s) IV Intermittent every 12 hours  vancomycin    Solution 125 milliGRAM(s) Oral every 6 hours    MEDICATIONS  (PRN):  dextrose 40% Gel 15 Gram(s) Oral once PRN Blood Glucose LESS THAN 70 milliGRAM(s)/deciliter  glucagon  Injectable 1 milliGRAM(s) IntraMuscular once PRN Glucose LESS THAN 70 milligrams/deciliter  petrolatum Ophthalmic Ointment 1 Application(s) Left EYE every 4 hours PRN dry eye    VITAL SIGNS:  T(C): 37.8 (10-20-20 @ 14:00), Max: 37.9 (10-20-20 @ 10:00)  HR: 92 (10-20-20 @ 15:00) (86 - 123)  BP: 105/55 (10-20-20 @ 14:00) (87/51 - 115/79)  RR: 18 (10-20-20 @ 15:00) (15 - 30)  SpO2: 100% (10-20-20 @ 15:00) (90% - 100%)  Wt(kg): --    General:  Constitutional:  Sitting comfortably in NAD.  Psychiatric: calm, normal affect, no overt anxiety or internal preoccupation  Ears, Nose, Throat: no abnormalities, mucus membranes moist  Neck: supple, no lymphadenopathy or nodules palpable  Cardiovascular: regular rate and rhythm, normal S1/S2, no murmurs   Chest: Clear to bases. 	  Abdomen: soft, non-tender, no hepatosplenomegaly   Extremities: no edema, clubbing or cyanosis  Skin: no rash or neurocutaneous signs     Cognitive:  Orientation, language, memory and knowledge screens intact.  Registration: 	4/4		Serial 7’s: normal		Recall 4/4    Cranial Nerves:  II: Full to confrontation. III/IV/VI: PERRL EOMF No nystagmus  V1V2V3: Symmetric, VII: Face appears symmetric VIII: Normal to screening, IX/X: Palate Elevates Symmetrical  XI: Trapezius Symmetric  XII: Tongue midline  Motor:  Power: 5/5 throughout, tone: normal x 4 limbs, no tremor   Sensation:  Intact to light touch. Intact to pinprick/temperature and vibration.  Coordination/Gait:  Finger-nose-finger intact, normal rapid-alternating movements.  Fine motor normal with normal rapid finger taps and heel-toe tapping   narrow based gait, tandem forward and back ok  hops well on both feet, heel and toe walking normal   Reflexes:  DTR: 2+ symmetric all 4 limbs, no clonus  Plantar responses: Down bilaterally     Labs:  CBC Full  -  ( 20 Oct 2020 05:22 )  WBC Count : 9.70 K/uL  RBC Count : 2.97 M/uL  Hemoglobin : 7.3 g/dL  Hematocrit : 23.2 %  Platelet Count - Automated : 367 K/uL  Mean Cell Volume : 78.1 fl  Mean Cell Hemoglobin : 24.6 pg  Mean Cell Hemoglobin Concentration : 31.5 gm/dL  Auto Neutrophil # : 6.10 K/uL  Auto Lymphocyte # : 2.97 K/uL  Auto Monocyte # : 0.41 K/uL  Auto Eosinophil # : 0.13 K/uL  Auto Basophil # : 0.02 K/uL  Auto Neutrophil % : 63.0 %  Auto Lymphocyte % : 30.6 %  Auto Monocyte % : 4.2 %  Auto Eosinophil % : 1.3 %  Auto Basophil % : 0.2 %    10-20    138  |  98  |  62<H>  ----------------------------<  81  3.4<L>   |  19<L>  |  8.40<H>    Ca    6.4<LL>      20 Oct 2020 05:22  Phos  7.6     10-20  Mg     1.8     10-20    TPro  5.8<L>  /  Alb  1.5<L>  /  TBili  0.2  /  DBili  x   /  AST  21  /  ALT  11  /  AlkPhos  349<H>  10-20    LIVER FUNCTIONS - ( 20 Oct 2020 05:22 )  Alb: 1.5 g/dL / Pro: 5.8 g/dL / ALK PHOS: 349 U/L / ALT: 11 U/L / AST: 21 U/L / GGT: x           AED LEVELS:  Valproic Acid Level, Serum: 25.6 ug/mL <L> [50.0 - 100.0] (10-19 @ 18:01)       EPILEPSY CONSULT NOTE:  HPI:  39M Undomiciled PMH Heroin abuse (on methadone) with spinal cord injury c/b b/l BKA 8-10 years ago ([facility]) now presents with worsening fatigue for the last 2 weeks, in the setting of 2 months of nausea and vomiting. Denies fever, chest pain, changes in mental status, cough, hemoptysis, weight loss, night sweats. Of note patient reports that he was recently incarcerated for 6 months and was released in July of this year, he reports that he had blood work done and there was no mention of renal dysfunction. He does not follow up with a primary care physician and does not take any medication other than methadone [phone]. He last used heroin 5 days ago but is making a concerted effort to stop using.      In the ED VS T 98 HR 99 /76 R16 96% on RA  Labs: WBC 7 | Hg/Hct 11/35 MCV 77 | Plt 577 | Na 140 K 5.4 | Co2 31 AG 33 BUN/ Cr 192/9.92 | TP/ Alb 8.8/ 2/2 | AST/ALT 22/10  |  EKG: NSR Qtc 468 peaked T waves in V3-V5 unknown baseline, LVH.  CXR - no focal consolidations    ED Course: Famotidine 20, Zofran 4 mg and 2L NS. Nephrology and Vacular Surgery consulted     Epilepsy was consulted for suspected status epilepticus (eyes roving and repetitive mouth movement). Patient was started on Keppra 1G BID and ketamine drip yesterday as per primary team, vimpat was recommended and was started overnight. Reportedly throughout today, patient had multiple seizure events, abdominal rhytmic movement, whole body twitching, rhythmic mouth jerking, and multiple agents were added in an attempt to stop the seizures (Vimpat was increased to 200mg Q12hrs, depakote 1G loaded and maintained on 1G Q12hrs, ativan administered, versed drip initiated and ultimately phenobarbital drip for burst suppression). Information obtained from father yesterday, patient has no hx of seizures, but has a sister with childhood epilepsy. Of note, patient was hospitalized on 10/12/20 for fatigue, nausea and vomiting and was found to have an acute kidney injury, and course was complicated by AMS/unresponsiveness for which neurology was following and now seizures.     Epilepsy risk factors:  Head injury with subsequent LOC?: Unable to obtain  Febrile seizures in infancy?: Unable to obtain  Hx of CNS infection?: Unable to obtain  Family hx of epilepsy?: Unable to obtain  Known CNS pathology?: Unable to obtain     Review of Systems:  Unable to obtain 2/2 medically induced coma on propofol, fentanyl. ketamine, versed and phenobarbital    PAST MEDICAL & SURGICAL HISTORY:  Drug abuse  K2  Heroin abuse  Above-knee amputation of right lower extremity  Above-knee amputation of left lower extremity     Allergies  No Known Allergies     MEDICATIONS  (STANDING):  acyclovir IVPB 200 milliGRAM(s) IV Intermittent every 24 hours  albumin human 25% IVPB 50 milliLiter(s) IV Intermittent every 1 hour  artificial  tears Solution 1 Drop(s) Both EYES every 12 hours  cefTRIAXone   IVPB 2000 milliGRAM(s) IV Intermittent every 12 hours  chlorhexidine 0.12% Liquid 15 milliLiter(s) Oral Mucosa every 12 hours  chlorhexidine 2% Cloths 1 Application(s) Topical <User Schedule>  dextrose 5%. 1000 milliLiter(s) (50 mL/Hr) IV Continuous <Continuous>  dextrose 50% Injectable 12.5 Gram(s) IV Push once  dextrose 50% Injectable 25 Gram(s) IV Push once  dextrose 50% Injectable 25 Gram(s) IV Push once  fentaNYL   Infusion. 0.5 MICROgram(s)/kG/Hr (2.27 mL/Hr) IV Continuous <Continuous>  heparin   Injectable 5000 Unit(s) SubCutaneous every 12 hours  influenza   Vaccine 0.5 milliLiter(s) IntraMuscular once  insulin lispro (ADMELOG) corrective regimen sliding scale   SubCutaneous every 6 hours  ketamine Infusion 0.5 mG/kG/Hr (2.05 mL/Hr) IV Continuous <Continuous>  lacosamide IVPB 200 milliGRAM(s) IV Intermittent every 12 hours  levETIRAcetam  IVPB 1000 milliGRAM(s) IV Intermittent every 24 hours  methadone    Tablet 90 milliGRAM(s) Oral daily  midazolam Infusion. 0.489 mG/kG/Hr (20 mL/Hr) IV Continuous <Continuous>  Nephro-dianne 1 Tablet(s) Oral daily  norepinephrine Infusion 0.05 MICROgram(s)/kG/Min (3.83 mL/Hr) IV Continuous <Continuous>  pantoprazole  Injectable 40 milliGRAM(s) IV Push daily  PHENobarbital Injectable 65 milliGRAM(s) IV Push every 12 hours  propofol Infusion. 60 MICROgram(s)/kG/Min (14.7 mL/Hr) IV Continuous <Continuous>  valproate sodium IVPB 1000 milliGRAM(s) IV Intermittent every 12 hours  vancomycin    Solution 125 milliGRAM(s) Oral every 6 hours    MEDICATIONS  (PRN):  dextrose 40% Gel 15 Gram(s) Oral once PRN Blood Glucose LESS THAN 70 milliGRAM(s)/deciliter  glucagon  Injectable 1 milliGRAM(s) IntraMuscular once PRN Glucose LESS THAN 70 milligrams/deciliter  petrolatum Ophthalmic Ointment 1 Application(s) Left EYE every 4 hours PRN dry eye    VITAL SIGNS:  T(C): 37.8 (10-20-20 @ 14:00), Max: 37.9 (10-20-20 @ 10:00)  HR: 92 (10-20-20 @ 15:00) (86 - 123)  BP: 105/55 (10-20-20 @ 14:00) (87/51 - 115/79)  RR: 18 (10-20-20 @ 15:00) (15 - 30)  SpO2: 100% (10-20-20 @ 15:00) (90% - 100%)  Wt(kg): --    PHYSICAL EXAM:  Constitutional: Young man appearing older than reported age intubated and in a medically induced coma.   Chest: Clear to bases. 	  Abdomen: soft, and flat  Extremities: B/L LE amputated  Skin: no rash or neurocutaneous signs     Cognitive:  Sedated, and intubated. Unable to elicit responses (PERRLA 2mm brisk bilaterally, negative corneal reflex, gag, and cough. No blink to threat bilaterally.   No Motor activity with deep noxious stimuli. Reflexes 2+ in upper limbs, +BKA amputation.      Labs:  CBC Full  -  ( 20 Oct 2020 05:22 )  WBC Count : 9.70 K/uL  RBC Count : 2.97 M/uL  Hemoglobin : 7.3 g/dL  Hematocrit : 23.2 %  Platelet Count - Automated : 367 K/uL  Mean Cell Volume : 78.1 fl  Mean Cell Hemoglobin : 24.6 pg  Mean Cell Hemoglobin Concentration : 31.5 gm/dL  Auto Neutrophil # : 6.10 K/uL  Auto Lymphocyte # : 2.97 K/uL  Auto Monocyte # : 0.41 K/uL  Auto Eosinophil # : 0.13 K/uL  Auto Basophil # : 0.02 K/uL  Auto Neutrophil % : 63.0 %  Auto Lymphocyte % : 30.6 %  Auto Monocyte % : 4.2 %  Auto Eosinophil % : 1.3 %  Auto Basophil % : 0.2 %    10-20    138  |  98  |  62<H>  ----------------------------<  81  3.4<L>   |  19<L>  |  8.40<H>    Ca    6.4<LL>      20 Oct 2020 05:22  Phos  7.6     10-20  Mg     1.8     10-20    TPro  5.8<L>  /  Alb  1.5<L>  /  TBili  0.2  /  DBili  x   /  AST  21  /  ALT  11  /  AlkPhos  349<H>  10-20    LIVER FUNCTIONS - ( 20 Oct 2020 05:22 )  Alb: 1.5 g/dL / Pro: 5.8 g/dL / ALK PHOS: 349 U/L / ALT: 11 U/L / AST: 21 U/L / GGT: x           AED LEVELS:  Valproic Acid Level, Serum: 25.6 ug/mL <L> [50.0 - 100.0] (10-19 @ 18:01)    < from: CT Head No Cont (10.17.20 @ 13:35) >    IMPRESSION:    1. No acute intracranial hemorrhage.  2. Apparent increased attenuation of the dural venous sinuses which may be due to hemoconcentration or related to patient's hydration status. If dural sinus thrombosis is a clinical concern, MR brain and venogram are recommended.  3. Remote bilateral orbitofacial fractures, as above.    < end of copied text >

## 2020-10-20 NOTE — CONSULT NOTE ADULT - ASSESSMENT
39 year-old undomiciled male with PMHx of HCV (untreated), drug use (K2, IV Heroin, last used 1 week prior to admission, utox also with cocaine and benzos), methadone dependence, spinal cord injury c/b b/l BKA who was admitted for acute renal failure of unclear etiology on 10/12/2020.     Course complicated w/ AMS and altered level of consciousness requiring intubation, and now in status epilepticus (required burst suppression for seizure control with multiple agents). Unclear etiology for status epilepticus given no personal history. Differential diagnosis include metabolic derangement coupled with provoking agent (cocaine seen on toxicology screen) vs structural defect that warrant further diagnostic tests.     Recommendations:  - Continue burst suppression for 24-48hrs  - Continue vEEG monitoring  - Please obtain MRI brain w/o contrast when possible  - Continue Vimpat 200mg Q12hrs  - Continue Keppra 1000mg Q12hrs  - Continue Versed drip   - Continue Phenobarbital 65mg Q12hrs   - Continue Depakote 1000mg Q12hrs  - Please obtain depakote level in AM  - Electrolyte derangement managed by nephrology by dialysis   - Palliative care following for poor mental status to date  - Maintain seizure and fall precautions

## 2020-10-20 NOTE — PROGRESS NOTE ADULT - ASSESSMENT
39M with PMHx of chronic hepatitis C (untreated), anxiety, polysubstance abuse (K2, IV Heroin, last used 10/7, utox also with cocaine and benzos), methadone dependence, spinal cord injury c/b b/l BKA 8-10 years ago, undomiciled recently incarcerated 6 months ago, presented on 10/12 with nausea and decreased PO intake x 2 months, weight loss of 50 lbs, and worsening generalized fatigue x 2 weeks. Admitted for acute renal failure, uremia and hyperkalemia. S/p hemodialysis. Hospital course cb c. diff, infection, seizure on 10/17 now intubated and sedated in ICU. Palliative Care consulted for [facility]. 39M with PMHx of chronic hepatitis C (untreated), anxiety, polysubstance abuse (K2, IV Heroin, last used 10/7, utox also with cocaine and benzos), methadone dependence, spinal cord injury c/b b/l BKA 8-10 years ago, undomiciled recently incarcerated 6 months ago, presented on 10/12 with nausea and decreased PO intake x 2 months, weight loss of 50 lbs, and worsening generalized fatigue x 2 weeks. Admitted for acute renal failure, uremia and hyperkalemia. S/p hemodialysis. Hospital course cb c. diff, infection, seizure on 10/17 now intubated and sedated in ICU. Pt in seizing throughout the day despite multiple medical interventions. Palliative Care consulted for [facility].

## 2020-10-20 NOTE — PROGRESS NOTE ADULT - PROBLEM SELECTOR PLAN 5
-[facility] note 10/19  - DNR/DNI, MOLST completed in physical chart  - What is most important to patient's health care surrogate is that patient "does not suffer" as he has been "living hard life and suffering since he was 15 years old."

## 2020-10-20 NOTE — PROGRESS NOTE ADULT - ATTENDING COMMENTS
Complex psychosocial history with IVDA a/w acute renal failure with hyperkalemia and Cdiff, requiring new HD. Complicated by acute onset of seizures 72 hours ago of unclear etiology; LP with RBCs but HSV PCR negative; not consistent with bacterial process. Would treat for meningoencephalitis for now; add Acyclovir and Ceftriaxone. Seizures have been refractory; now on third line AEDs including phenobarbital. Will need imaging; will start with CT head and then MRI once can facilitate logistics (is on multiple drips and needs a lot of tubing).

## 2020-10-20 NOTE — PROGRESS NOTE ADULT - PROBLEM SELECTOR PLAN 7
As discussed during the palliative IDT meeting and as identified during the patients PSSA screening the patient would benefit from: patient and family support following for Marc Goncalves, pt's father, who endorses severe emotional distress and grief     Support provided to patient and family. Patient to have access to supportive services during rest of hospital stay as the patient/family deemed necessary ie. Chaplaincy, Massage therapy, Music therapy, Patient and family supportive services, Palliative SW, etc.

## 2020-10-20 NOTE — PROGRESS NOTE ADULT - PROBLEM SELECTOR PLAN 4
recent incarceration, released July 2020. at admission pt endorsed cocaine, heroin (5 days ago) and K2 use. On methadone 90mg qd.   - Utox on admission positive for opioids, cocaine, benzos and methadone   - confirmed methadone dosing 90mg qd - [phone] [facility] [address], [place] - [id number]  - C/w methadone 90mg qd  - Avoid beta blockers  - HIV neg, positive Hep C antibody   - TB quant negative   - patient endorses IV drug use with no specific location of use, continue to treat patient with Vancomycin and Ceftriaxone for endocarditis prophylaxis in the setting of cellulitis as stated above

## 2020-10-20 NOTE — PROGRESS NOTE ADULT - SUBJECTIVE AND OBJECTIVE BOX
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 Denies fever, chest pain, changes in mental status, cough, hemoptysis, weight loss, night sweats. Of note patient reports that he was recently incarcerated for 6 months and was released in July of this year, he reports that he had blood work done and there was no mention of renal dysfunction. He does not follow up with a primary care physician and does not take any medication other than methadone [phone]. He last used heroin 5 days ago but is making a concerted effort to stop using.        In the ED VS T 98 HR 99 /76 R16 96% on RA  Labs: WBC 7 | Hg/Hct 11/35 MCV 77 | Plt 577 | Na 140 K 5.4 | Co2 31 AG 33 BUN/ Cr 192/9.92 | TP/ Alb 8.8/ 2/2 | AST/ALT 22/10  |  EKG: NSR Qtc 468 peaked T waves in V3-V5 unknown baseline, LVH.  CXR - no focal consolidations    ED Course: Famotidine 20, Zofran 4 mg and 2L NS. Nephrology and Vacular Surgery consulted      (12 Oct 2020 17:44)      SUBJECTIVE:    ROS:  DYSPNEA: Y / N	  NAUS/VOM: Y / N	  SECRETIONS: Y / N	  AGITATION: Y / N  Pain (Y/N):       -Provocation/Palliation:  -Quality/Quantity:  -Radiating:  -Severity:  -Timing/Frequency:  -Impact on ADLs:    OTHER REVIEW OF SYSTEMS:  UNABLE TO OBTAIN  due to:    PEx:  T(C): 37.7 (10-20-20 @ 06:00), Max: 37.8 (10-19-20 @ 10:00)  HR: 89 (10-20-20 @ 09:00) (86 - 146)  BP: 106/56 (10-20-20 @ 09:00) (87/51 - 144/93)  RR: 15 (10-20-20 @ 09:00) (15 - 34)  SpO2: 100% (10-20-20 @ 09:00) (90% - 100%)  Wt(kg): --    GENERAL:    HEENT:      	  NECK:           CVS:           	  RESP:        	  GI:             	  Last BM:     :            	  MUSC:       	  NEURO:     	  PSYCH:          SKIN:         	   LYMPH:      	     ALLERGIES: No Known Allergies      OPIATE NAÏVE (Y/N):    MEDICATIONS: REVIEWED  MEDICATIONS  (STANDING):  artificial  tears Solution 1 Drop(s) Both EYES every 12 hours  cefTRIAXone   IVPB 2000 milliGRAM(s) IV Intermittent every 12 hours  chlorhexidine 0.12% Liquid 15 milliLiter(s) Oral Mucosa every 12 hours  chlorhexidine 2% Cloths 1 Application(s) Topical <User Schedule>  fentaNYL   Infusion. 0.5 MICROgram(s)/kG/Hr (2.27 mL/Hr) IV Continuous <Continuous>  heparin   Injectable 5000 Unit(s) SubCutaneous every 12 hours  influenza   Vaccine 0.5 milliLiter(s) IntraMuscular once  lacosamide IVPB 100 milliGRAM(s) IV Intermittent every 12 hours  levETIRAcetam  IVPB 1000 milliGRAM(s) IV Intermittent every 12 hours  methadone    Tablet 90 milliGRAM(s) Oral daily  midazolam Infusion. 0.049 mG/kG/Hr (2 mL/Hr) IV Continuous <Continuous>  Nephro-dianne 1 Tablet(s) Oral daily  norepinephrine Infusion 0.05 MICROgram(s)/kG/Min (4.26 mL/Hr) IV Continuous <Continuous>  propofol Infusion 10 MICROgram(s)/kG/Min (2.45 mL/Hr) IV Continuous <Continuous>  valproate sodium IVPB 500 milliGRAM(s) IV Intermittent every 12 hours  vancomycin    Solution 125 milliGRAM(s) Oral every 6 hours    MEDICATIONS  (PRN):  LORazepam   Injectable 2 milliGRAM(s) IV Push every 2 hours PRN Symptom-triggered: each CIWA -Ar score 8 or GREATER  petrolatum Ophthalmic Ointment 1 Application(s) Left EYE every 4 hours PRN dry eye    LABS: REVIEWED  CBC:                        7.3    9.70  )-----------( 367      ( 20 Oct 2020 05:22 )             23.2     CMP:    10-20    138  |  98  |  62<H>  ----------------------------<  81  3.4<L>   |  19<L>  |  8.40<H>    Ca    6.4<LL>      20 Oct 2020 05:22  Phos  7.6     10-20  Mg     1.8     10-20    TPro  5.8<L>  /  Alb  1.5<L>  /  TBili  0.2  /  DBili  x   /  AST  21  /  ALT  11  /  AlkPhos  349<H>  10-20  Albumin, Serum: 1.5 g/dL (10-20-20 @ 05:22)      IMAGING: REVIEWED    ADVANCED DIRECTIVES:            FULL CODE            DNR DNI            LIVING WILL  not on file in [facility].       DPOA not on file in [facility].       HCP formal form not in [facility].       MOLST     DECISION MAKER: The patient is able to participate in complex medical decision making conversations.   LEGAL SURROGATE:        GOALS OF CARE DISCUSSION       Palliative care info/counseling provided	           Advanced Directives addressed please see Advance Care Planning Note	           Documentation of GOC:    AGENCY CHOICE DISCUSSED:          PSYCHOSOCIAL-SPIRITUAL ASSESSMENT: Reviewed, Care plan unchanged    REFERRALS: SARAH PARK             MRN-[id number]    CC: nausea fatigue     HPI:    39M Undomiciled PMH Heroin abuse (on methadone) with spinal cord injury c/b b/l BKA 8-10 years ago ([facility]) now presents with worsening fatigue for the last 2 weeks, in the setting of 2 months of nausea and vomiting. Denies fever, chest pain, changes in mental status, cough, hemoptysis, weight loss, night sweats. Of note patient reports that he was recently incarcerated for 6 months and was released in July of this year, he reports that he had blood work done and there was no mention of renal dysfunction. He does not follow up with a primary care physician and does not take any medication other than methadone [phone]. He last used heroin 5 days ago but is making a concerted effort to stop using.        In the ED VS T 98 HR 99 /76 R16 96% on RA  Labs: WBC 7 | Hg/Hct 11/35 MCV 77 | Plt 577 | Na 140 K 5.4 | Co2 31 AG 33 BUN/ Cr 192/9.92 | TP/ Alb 8.8/ 2/2 | AST/ALT 22/10  |  EKG: NSR Qtc 468 peaked T waves in V3-V5 unknown baseline, LVH.  CXR - no focal consolidations    ED Course: Famotidine 20, Zofran 4 mg and 2L NS. Nephrology and Vacular Surgery consulted      (12 Oct 2020 17:44)    Overnight:  O/n the valproic acid level resulted as subtherapeutic. Per Dr. Thomas of epilepsy, increased ketamine from 0.5 to 1 at 9pm, and per neurology note started lacosamide 100mg BID w/one time 100mg loading dose. K repleted w/20 meq as 3.1. This AM patient was having abdominal twitching with roving eyes and was unresponsive and not redirectable. Epilepsy called to confirm seizure activity on EEG, fentanyl discontinued, dropped Ketamine back to 0.5, gave a 10 push of versed and started versed drip at 10 ml/hr and later increased to 20 ml/hr, increased lacosamide to 200 mg BID, Keppra 1000 mg daily, Valproate 1000 mg BID, Phenobarbital 65 BID, Magnesium 4 g, and propofol 60 mcg/kg/hr or 14.7 ml/hr drip. Patient continued to seize throughout the afternoon as these adjustments were made, physical seizure activity finally stopped shortly after Phenobarbital was initiated. Patient was waiting for MRI to assess encephalopathy but cannot obtain 2/2 number of drips patient currently needs.    SUBJECTIVE: intubated and sedated man lying in bed in status ellipticus. unable to obtain ROS      PEx:  T(C): 37.7 (10-20-20 @ 06:00), Max: 37.8 (10-19-20 @ 10:00)  HR: 89 (10-20-20 @ 09:00) (86 - 146)  BP: 106/56 (10-20-20 @ 09:00) (87/51 - 144/93)  RR: 15 (10-20-20 @ 09:00) (15 - 34)  SpO2: 100% (10-20-20 @ 09:00) (90% - 100%)  Wt(kg): 40.9 kg    PE deferred due to pt condition: 	     ALLERGIES: No Known Allergies    OPIATE NAÏVE (Y/N): N    MEDICATIONS: REVIEWED  MEDICATIONS  (STANDING):  artificial  tears Solution 1 Drop(s) Both EYES every 12 hours  cefTRIAXone   IVPB 2000 milliGRAM(s) IV Intermittent every 12 hours  chlorhexidine 0.12% Liquid 15 milliLiter(s) Oral Mucosa every 12 hours  chlorhexidine 2% Cloths 1 Application(s) Topical <User Schedule>  fentaNYL   Infusion. 0.5 MICROgram(s)/kG/Hr (2.27 mL/Hr) IV Continuous <Continuous>  heparin   Injectable 5000 Unit(s) SubCutaneous every 12 hours  influenza   Vaccine 0.5 milliLiter(s) IntraMuscular once  lacosamide IVPB 100 milliGRAM(s) IV Intermittent every 12 hours  levETIRAcetam  IVPB 1000 milliGRAM(s) IV Intermittent every 12 hours  methadone    Tablet 90 milliGRAM(s) Oral daily  midazolam Infusion. 0.049 mG/kG/Hr (2 mL/Hr) IV Continuous <Continuous>  Nephro-dianne 1 Tablet(s) Oral daily  norepinephrine Infusion 0.05 MICROgram(s)/kG/Min (4.26 mL/Hr) IV Continuous <Continuous>  propofol Infusion 10 MICROgram(s)/kG/Min (2.45 mL/Hr) IV Continuous <Continuous>  valproate sodium IVPB 500 milliGRAM(s) IV Intermittent every 12 hours  vancomycin    Solution 125 milliGRAM(s) Oral every 6 hours    MEDICATIONS  (PRN):  LORazepam   Injectable 2 milliGRAM(s) IV Push every 2 hours PRN Symptom-triggered: each CIWA -Ar score 8 or GREATER  petrolatum Ophthalmic Ointment 1 Application(s) Left EYE every 4 hours PRN dry eye    LABS: REVIEWED  CBC:                        7.3    9.70  )-----------( 367      ( 20 Oct 2020 05:22 )             23.2     CMP:    10-20    138  |  98  |  62<H>  ----------------------------<  81  3.4<L>   |  19<L>  |  8.40<H>    Ca    6.4<LL>      20 Oct 2020 05:22  Phos  7.6     10-20  Mg     1.8     10-20    TPro  5.8<L>  /  Alb  1.5<L>  /  TBili  0.2  /  DBili  x   /  AST  21  /  ALT  11  /  AlkPhos  349<H>  10-20  Albumin, Serum: 1.5 g/dL (10-20-20 @ 05:22)      IMAGING: REVIEWED    < from: Xray Chest 1 View- PORTABLE-Urgent (Xray Chest 1 View- PORTABLE-Urgent .) (10.19.20 @ 10:32) >  Findings/  impression: Stable positioning of support devices. Left basilar opacity/pleural effusion, decreased. Heart size within normal limits. Right pleural effusion. Stable bony structures.      Advanced Directives:     GOC conversation 10/19, DNR/DNI      MOLST in physical chart Completed 10/19, DNR/DNI     HCP, DPOA, Living Will: no formal form not in [facility]    Decision maker: The patient is UNable to participate in complex medical decision making conversations.   Legal surrogate: pt's father, Marc Nunes #[phone], pt aox3 at admission, documenting his father Marc as health care surrogate     GOALS OF CARE DISCUSSION	           Advanced Directives addressed please see Advance Care Planning Note 10/19	           Documentation of GOC: 	DNR/DNI. MOLST completed 10/19 and placed in physical chart.    REFERRALS: patient and family support following

## 2020-10-21 NOTE — PROGRESS NOTE ADULT - SUBJECTIVE AND OBJECTIVE BOX
****INCOMPLETE****  Patient is a 39y old  Male who presents with a chief complaint of Renal Failure (20 Oct 2020 17:09)      INTERVAL HPI/OVERNIGHT EVENTS:   No overnight events   Afebrile, hemodynamically stable     ICU Vital Signs Last 24 Hrs  T(C): 38.6 (21 Oct 2020 09:00), Max: 39.7 (21 Oct 2020 07:00)  T(F): 101.5 (21 Oct 2020 09:00), Max: 103.5 (21 Oct 2020 07:00)  HR: 73 (21 Oct 2020 10:01) (73 - 170)  BP: 119/56 (21 Oct 2020 09:00) (93/54 - 119/56)  BP(mean): 79 (21 Oct 2020 09:00) (66 - 80)  ABP: 127/44 (21 Oct 2020 10:01) (100/35 - 139/52)  ABP(mean): 65 (21 Oct 2020 10:01) (50 - 73)  RR: 19 (21 Oct 2020 10:01) (15 - 22)  SpO2: 95% (21 Oct 2020 10:01) (71% - 100%)    I&O's Summary    20 Oct 2020 07:01  -  21 Oct 2020 07:00  --------------------------------------------------------  IN: 4392.2 mL / OUT: 0 mL / NET: 4392.2 mL    21 Oct 2020 07:01  -  21 Oct 2020 10:44  --------------------------------------------------------  IN: 652.1 mL / OUT: 0 mL / NET: 652.1 mL      Mode: AC/ CMV (Assist Control/ Continuous Mandatory Ventilation)  RR (machine): 15  TV (machine): 400  FiO2: 80  PEEP: 5  ITime: 1  MAP: 8  PIP: 16      LABS:                        6.7    10.82 )-----------( 351      ( 21 Oct 2020 05:13 )             22.0     10-21    133<L>  |  97  |  60<H>  ----------------------------<  95  4.7   |  16<L>  |  8.49<H>    Ca    6.1<LL>      21 Oct 2020 05:13  Phos  7.7     10-21  Mg     2.8     10-21    TPro  5.6<L>  /  Alb  1.2<L>  /  TBili  <0.2  /  DBili  x   /  AST  22  /  ALT  10  /  AlkPhos  298<H>  10-21        CAPILLARY BLOOD GLUCOSE      POCT Blood Glucose.: 95 mg/dL (21 Oct 2020 06:35)  POCT Blood Glucose.: 113 mg/dL (20 Oct 2020 23:55)  POCT Blood Glucose.: 124 mg/dL (20 Oct 2020 18:59)  POCT Blood Glucose.: 50 mg/dL (20 Oct 2020 17:35)  POCT Blood Glucose.: 56 mg/dL (20 Oct 2020 17:32)  POCT Blood Glucose.: 84 mg/dL (20 Oct 2020 12:26)  POCT Blood Glucose.: 60 mg/dL (20 Oct 2020 11:16)    ABG - ( 20 Oct 2020 23:40 )  pH, Arterial: 7.29  pH, Blood: x     /  pCO2: 33    /  pO2: 168   / HCO3: 15    / Base Excess: -10.3 /  SaO2: 99                  RADIOLOGY & ADDITIONAL TESTS:    Consultant(s) Notes Reviewed:  [x ] YES  [ ] NO    MEDICATIONS  (STANDING):  acyclovir IVPB 200 milliGRAM(s) IV Intermittent every 24 hours  artificial  tears Solution 1 Drop(s) Both EYES every 12 hours  cefTRIAXone   IVPB 2000 milliGRAM(s) IV Intermittent every 12 hours  chlorhexidine 0.12% Liquid 15 milliLiter(s) Oral Mucosa every 12 hours  chlorhexidine 2% Cloths 1 Application(s) Topical <User Schedule>  dextrose 10%. 1000 milliLiter(s) (50 mL/Hr) IV Continuous <Continuous>  dextrose 5%. 1000 milliLiter(s) (50 mL/Hr) IV Continuous <Continuous>  dextrose 50% Injectable 12.5 Gram(s) IV Push once  dextrose 50% Injectable 25 Gram(s) IV Push once  dextrose 50% Injectable 25 Gram(s) IV Push once  fentaNYL   Infusion. 0.5 MICROgram(s)/kG/Hr (2.27 mL/Hr) IV Continuous <Continuous>  heparin   Injectable 5000 Unit(s) SubCutaneous every 12 hours  influenza   Vaccine 0.5 milliLiter(s) IntraMuscular once  insulin lispro (ADMELOG) corrective regimen sliding scale   SubCutaneous every 6 hours  ketamine Infusion 1 mG/kG/Hr (4.09 mL/Hr) IV Continuous <Continuous>  lacosamide IVPB 200 milliGRAM(s) IV Intermittent every 12 hours  levETIRAcetam  IVPB 1000 milliGRAM(s) IV Intermittent every 24 hours  methadone    Tablet 90 milliGRAM(s) Oral daily  midazolam Infusion.. 0.875 mG/kG/Hr (7.16 mL/Hr) IV Continuous <Continuous>  Nephro-dianne 1 Tablet(s) Oral daily  norepinephrine Infusion 0.05 MICROgram(s)/kG/Min (1.92 mL/Hr) IV Continuous <Continuous>  pantoprazole  Injectable 40 milliGRAM(s) IV Push daily  PHENobarbital IVPB 65 milliGRAM(s) IV Intermittent every 6 hours  propofol Infusion. 60 MICROgram(s)/kG/Min (14.7 mL/Hr) IV Continuous <Continuous>  valproate sodium IVPB 1000 milliGRAM(s) IV Intermittent every 12 hours  vancomycin    Solution 125 milliGRAM(s) Oral every 6 hours    MEDICATIONS  (PRN):  acetaminophen    Suspension .. 650 milliGRAM(s) Oral every 6 hours PRN Temp greater or equal to 38C (100.4F)  dextrose 40% Gel 15 Gram(s) Oral once PRN Blood Glucose LESS THAN 70 milliGRAM(s)/deciliter  glucagon  Injectable 1 milliGRAM(s) IntraMuscular once PRN Glucose LESS THAN 70 milligrams/deciliter  petrolatum Ophthalmic Ointment 1 Application(s) Left EYE every 4 hours PRN dry eye      PHYSICAL EXAM:  GENERAL:   HEAD:  Atraumatic, Normocephalic  EYES: EOMI, PERRLA, conjunctiva and sclera clear  NECK: Supple, No JVD, Normal thyroid, no enlarged nodes  NERVOUS SYSTEM:  Alert & Awake.   CHEST/LUNG: B/L good air entry; No rales, rhonchi, or wheezing  HEART: S1S2 normal, no S3, Regular rate and rhythm; No murmurs  ABDOMEN: Soft, Nontender, Nondistended; Bowel sounds present  EXTREMITIES:  2+ Peripheral Pulses, No clubbing, cyanosis, or edema  LYMPH: No lymphadenopathy noted  SKIN: No rashes or lesions    Care Discussed with Consultants/Other Providers [ x] YES  [ ] NO 38 yo M w/ PMH Heroin abuse (on methadone) with spinal cord injury c/b b/l BKA 8-10 years ago now presenting with JEAN PIERRE with hyperkalemia and uremia, complicated by AMS encephalopathy with  intractable seizures s/p intubation for airway protection and transfer to MICU for further care.       INTERVAL HPI/OVERNIGHT EVENTS:   Patient continued to seize throughout the night with Versed increased to 30, and 2 pushes of Phenobarb 120. In the AM, adjusted sedation regimen to include phenobarbital drip at 10 mg/hr  Afebrile, hemodynamically stable     ICU Vital Signs Last 24 Hrs  T(C): 38.6 (21 Oct 2020 09:00), Max: 39.7 (21 Oct 2020 07:00)  T(F): 101.5 (21 Oct 2020 09:00), Max: 103.5 (21 Oct 2020 07:00)  HR: 73 (21 Oct 2020 10:01) (73 - 170)  BP: 119/56 (21 Oct 2020 09:00) (93/54 - 119/56)  BP(mean): 79 (21 Oct 2020 09:00) (66 - 80)  ABP: 127/44 (21 Oct 2020 10:01) (100/35 - 139/52)  ABP(mean): 65 (21 Oct 2020 10:01) (50 - 73)  RR: 19 (21 Oct 2020 10:01) (15 - 22)  SpO2: 95% (21 Oct 2020 10:01) (71% - 100%)    I&O's Summary    20 Oct 2020 07:01  -  21 Oct 2020 07:00  --------------------------------------------------------  IN: 4392.2 mL / OUT: 0 mL / NET: 4392.2 mL    21 Oct 2020 07:01  -  21 Oct 2020 10:44  --------------------------------------------------------  IN: 652.1 mL / OUT: 0 mL / NET: 652.1 mL      Mode: AC/ CMV (Assist Control/ Continuous Mandatory Ventilation)  RR (machine): 15  TV (machine): 400  FiO2: 80  PEEP: 5  ITime: 1  MAP: 8  PIP: 16      LABS:                        6.7    10.82 )-----------( 351      ( 21 Oct 2020 05:13 )             22.0     10-21    133<L>  |  97  |  60<H>  ----------------------------<  95  4.7   |  16<L>  |  8.49<H>    Ca    6.1<LL>      21 Oct 2020 05:13  Phos  7.7     10-21  Mg     2.8     10-21    TPro  5.6<L>  /  Alb  1.2<L>  /  TBili  <0.2  /  DBili  x   /  AST  22  /  ALT  10  /  AlkPhos  298<H>  10-21        CAPILLARY BLOOD GLUCOSE      POCT Blood Glucose.: 95 mg/dL (21 Oct 2020 06:35)  POCT Blood Glucose.: 113 mg/dL (20 Oct 2020 23:55)  POCT Blood Glucose.: 124 mg/dL (20 Oct 2020 18:59)  POCT Blood Glucose.: 50 mg/dL (20 Oct 2020 17:35)  POCT Blood Glucose.: 56 mg/dL (20 Oct 2020 17:32)  POCT Blood Glucose.: 84 mg/dL (20 Oct 2020 12:26)  POCT Blood Glucose.: 60 mg/dL (20 Oct 2020 11:16)    ABG - ( 20 Oct 2020 23:40 )  pH, Arterial: 7.29  pH, Blood: x     /  pCO2: 33    /  pO2: 168   / HCO3: 15    / Base Excess: -10.3 /  SaO2: 99                  RADIOLOGY & ADDITIONAL TESTS:    Consultant(s) Notes Reviewed:  [x ] YES  [ ] NO    MEDICATIONS  (STANDING):  acyclovir IVPB 200 milliGRAM(s) IV Intermittent every 24 hours  artificial  tears Solution 1 Drop(s) Both EYES every 12 hours  cefTRIAXone   IVPB 2000 milliGRAM(s) IV Intermittent every 12 hours  chlorhexidine 0.12% Liquid 15 milliLiter(s) Oral Mucosa every 12 hours  chlorhexidine 2% Cloths 1 Application(s) Topical <User Schedule>  dextrose 10%. 1000 milliLiter(s) (50 mL/Hr) IV Continuous <Continuous>  dextrose 5%. 1000 milliLiter(s) (50 mL/Hr) IV Continuous <Continuous>  dextrose 50% Injectable 12.5 Gram(s) IV Push once  dextrose 50% Injectable 25 Gram(s) IV Push once  dextrose 50% Injectable 25 Gram(s) IV Push once  fentaNYL   Infusion. 0.5 MICROgram(s)/kG/Hr (2.27 mL/Hr) IV Continuous <Continuous>  heparin   Injectable 5000 Unit(s) SubCutaneous every 12 hours  influenza   Vaccine 0.5 milliLiter(s) IntraMuscular once  insulin lispro (ADMELOG) corrective regimen sliding scale   SubCutaneous every 6 hours  ketamine Infusion 1 mG/kG/Hr (4.09 mL/Hr) IV Continuous <Continuous>  lacosamide IVPB 200 milliGRAM(s) IV Intermittent every 12 hours  levETIRAcetam  IVPB 1000 milliGRAM(s) IV Intermittent every 24 hours  methadone    Tablet 90 milliGRAM(s) Oral daily  midazolam Infusion.. 0.875 mG/kG/Hr (7.16 mL/Hr) IV Continuous <Continuous>  Nephro-dianne 1 Tablet(s) Oral daily  norepinephrine Infusion 0.05 MICROgram(s)/kG/Min (1.92 mL/Hr) IV Continuous <Continuous>  pantoprazole  Injectable 40 milliGRAM(s) IV Push daily  PHENobarbital IVPB 65 milliGRAM(s) IV Intermittent every 6 hours  propofol Infusion. 60 MICROgram(s)/kG/Min (14.7 mL/Hr) IV Continuous <Continuous>  valproate sodium IVPB 1000 milliGRAM(s) IV Intermittent every 12 hours  vancomycin    Solution 125 milliGRAM(s) Oral every 6 hours    MEDICATIONS  (PRN):  acetaminophen    Suspension .. 650 milliGRAM(s) Oral every 6 hours PRN Temp greater or equal to 38C (100.4F)  dextrose 40% Gel 15 Gram(s) Oral once PRN Blood Glucose LESS THAN 70 milliGRAM(s)/deciliter  glucagon  Injectable 1 milliGRAM(s) IntraMuscular once PRN Glucose LESS THAN 70 milligrams/deciliter  petrolatum Ophthalmic Ointment 1 Application(s) Left EYE every 4 hours PRN dry eye      PHYSICAL EXAM:  GENERAL:   HEAD:  Atraumatic, Normocephalic  EYES: EOMI, PERRLA, conjunctiva and sclera clear  NECK: Supple, No JVD, Normal thyroid, no enlarged nodes  NERVOUS SYSTEM:  Alert & Awake.   CHEST/LUNG: B/L good air entry; No rales, rhonchi, or wheezing  HEART: S1S2 normal, no S3, Regular rate and rhythm; No murmurs  ABDOMEN: Soft, Nontender, Nondistended; Bowel sounds present  EXTREMITIES:  2+ Peripheral Pulses, No clubbing, cyanosis, or edema  LYMPH: No lymphadenopathy noted  SKIN: No rashes or lesions    Care Discussed with Consultants/Other Providers [ x] YES  [ ] NO 38 yo M w/ PMH Heroin abuse (on methadone) with spinal cord injury c/b b/l BKA 8-10 years ago now presenting with JEAN PIERRE with hyperkalemia and uremia, complicated by AMS encephalopathy with  intractable seizures s/p intubation for airway protection and transfer to MICU for further care.       INTERVAL HPI/OVERNIGHT EVENTS:   Patient continued to seize throughout the night with Versed increased to 30, and 2 pushes of Phenobarb 120. In the AM, adjusted sedation regimen to include phenobarbital drip at 10 mg/hr with cessation of physical seizure activity. CTH performed bedside showing diffuse effacement of the sulci. Family has opted to stop the pressers at this time.    ICU Vital Signs Last 24 Hrs  T(C): 38.6 (21 Oct 2020 09:00), Max: 39.7 (21 Oct 2020 07:00)  T(F): 101.5 (21 Oct 2020 09:00), Max: 103.5 (21 Oct 2020 07:00)  HR: 73 (21 Oct 2020 10:01) (73 - 170)  BP: 119/56 (21 Oct 2020 09:00) (93/54 - 119/56)  BP(mean): 79 (21 Oct 2020 09:00) (66 - 80)  ABP: 127/44 (21 Oct 2020 10:01) (100/35 - 139/52)  ABP(mean): 65 (21 Oct 2020 10:01) (50 - 73)  RR: 19 (21 Oct 2020 10:01) (15 - 22)  SpO2: 95% (21 Oct 2020 10:01) (71% - 100%)    I&O's Summary    20 Oct 2020 07:01  -  21 Oct 2020 07:00  --------------------------------------------------------  IN: 4392.2 mL / OUT: 0 mL / NET: 4392.2 mL    21 Oct 2020 07:01  -  21 Oct 2020 10:44  --------------------------------------------------------  IN: 652.1 mL / OUT: 0 mL / NET: 652.1 mL      Mode: AC/ CMV (Assist Control/ Continuous Mandatory Ventilation)  RR (machine): 15  TV (machine): 400  FiO2: 80  PEEP: 5  ITime: 1  MAP: 8  PIP: 16      LABS:                        6.7    10.82 )-----------( 351      ( 21 Oct 2020 05:13 )             22.0     10-21    133<L>  |  97  |  60<H>  ----------------------------<  95  4.7   |  16<L>  |  8.49<H>    Ca    6.1<LL>      21 Oct 2020 05:13  Phos  7.7     10-21  Mg     2.8     10-21    TPro  5.6<L>  /  Alb  1.2<L>  /  TBili  <0.2  /  DBili  x   /  AST  22  /  ALT  10  /  AlkPhos  298<H>  10-21        CAPILLARY BLOOD GLUCOSE      POCT Blood Glucose.: 95 mg/dL (21 Oct 2020 06:35)  POCT Blood Glucose.: 113 mg/dL (20 Oct 2020 23:55)  POCT Blood Glucose.: 124 mg/dL (20 Oct 2020 18:59)  POCT Blood Glucose.: 50 mg/dL (20 Oct 2020 17:35)  POCT Blood Glucose.: 56 mg/dL (20 Oct 2020 17:32)  POCT Blood Glucose.: 84 mg/dL (20 Oct 2020 12:26)  POCT Blood Glucose.: 60 mg/dL (20 Oct 2020 11:16)    ABG - ( 20 Oct 2020 23:40 )  pH, Arterial: 7.29  pH, Blood: x     /  pCO2: 33    /  pO2: 168   / HCO3: 15    / Base Excess: -10.3 /  SaO2: 99                  RADIOLOGY & ADDITIONAL TESTS:    Consultant(s) Notes Reviewed:  [x ] YES  [ ] NO    MEDICATIONS  (STANDING):  acyclovir IVPB 200 milliGRAM(s) IV Intermittent every 24 hours  artificial  tears Solution 1 Drop(s) Both EYES every 12 hours  cefTRIAXone   IVPB 2000 milliGRAM(s) IV Intermittent every 12 hours  chlorhexidine 0.12% Liquid 15 milliLiter(s) Oral Mucosa every 12 hours  chlorhexidine 2% Cloths 1 Application(s) Topical <User Schedule>  dextrose 10%. 1000 milliLiter(s) (50 mL/Hr) IV Continuous <Continuous>  dextrose 5%. 1000 milliLiter(s) (50 mL/Hr) IV Continuous <Continuous>  dextrose 50% Injectable 12.5 Gram(s) IV Push once  dextrose 50% Injectable 25 Gram(s) IV Push once  dextrose 50% Injectable 25 Gram(s) IV Push once  fentaNYL   Infusion. 0.5 MICROgram(s)/kG/Hr (2.27 mL/Hr) IV Continuous <Continuous>  heparin   Injectable 5000 Unit(s) SubCutaneous every 12 hours  influenza   Vaccine 0.5 milliLiter(s) IntraMuscular once  insulin lispro (ADMELOG) corrective regimen sliding scale   SubCutaneous every 6 hours  ketamine Infusion 1 mG/kG/Hr (4.09 mL/Hr) IV Continuous <Continuous>  lacosamide IVPB 200 milliGRAM(s) IV Intermittent every 12 hours  levETIRAcetam  IVPB 1000 milliGRAM(s) IV Intermittent every 24 hours  methadone    Tablet 90 milliGRAM(s) Oral daily  midazolam Infusion.. 0.875 mG/kG/Hr (7.16 mL/Hr) IV Continuous <Continuous>  Nephro-dianne 1 Tablet(s) Oral daily  norepinephrine Infusion 0.05 MICROgram(s)/kG/Min (1.92 mL/Hr) IV Continuous <Continuous>  pantoprazole  Injectable 40 milliGRAM(s) IV Push daily  PHENobarbital IVPB 65 milliGRAM(s) IV Intermittent every 6 hours  propofol Infusion. 60 MICROgram(s)/kG/Min (14.7 mL/Hr) IV Continuous <Continuous>  valproate sodium IVPB 1000 milliGRAM(s) IV Intermittent every 12 hours  vancomycin    Solution 125 milliGRAM(s) Oral every 6 hours    MEDICATIONS  (PRN):  acetaminophen    Suspension .. 650 milliGRAM(s) Oral every 6 hours PRN Temp greater or equal to 38C (100.4F)  dextrose 40% Gel 15 Gram(s) Oral once PRN Blood Glucose LESS THAN 70 milliGRAM(s)/deciliter  glucagon  Injectable 1 milliGRAM(s) IntraMuscular once PRN Glucose LESS THAN 70 milligrams/deciliter  petrolatum Ophthalmic Ointment 1 Application(s) Left EYE every 4 hours PRN dry eye      PHYSICAL EXAM:  GENERAL:   HEAD:  Atraumatic, Normocephalic  EYES: EOMI, PERRLA, conjunctiva and sclera clear  NECK: Supple, No JVD, Normal thyroid, no enlarged nodes  NERVOUS SYSTEM:  Alert & Awake.   CHEST/LUNG: B/L good air entry; No rales, rhonchi, or wheezing  HEART: S1S2 normal, no S3, Regular rate and rhythm; No murmurs  ABDOMEN: Soft, Nontender, Nondistended; Bowel sounds present  EXTREMITIES:  2+ Peripheral Pulses, No clubbing, cyanosis, or edema  LYMPH: No lymphadenopathy noted  SKIN: No rashes or lesions    Care Discussed with Consultants/Other Providers [ x] YES  [ ] NO 38 yo M w/ PMH Heroin abuse (on methadone) with spinal cord injury c/b b/l BKA 8-10 years ago now presenting with JEAN PIERRE with hyperkalemia and uremia, complicated by AMS encephalopathy with  intractable seizures s/p intubation for airway protection and transfer to MICU for further care.       INTERVAL HPI/OVERNIGHT EVENTS:   Patient continued to seize throughout the night with Versed increased to 30, and 2 pushes of Phenobarb 120. In the AM, adjusted sedation regimen to include phenobarbital drip at 10 mg/hr with cessation of physical seizure activity. CTH performed bedside showing diffuse effacement of the sulci. GOC discussion with family this afternoon.    ICU Vital Signs Last 24 Hrs  T(C): 38.6 (21 Oct 2020 09:00), Max: 39.7 (21 Oct 2020 07:00)  T(F): 101.5 (21 Oct 2020 09:00), Max: 103.5 (21 Oct 2020 07:00)  HR: 73 (21 Oct 2020 10:01) (73 - 170)  BP: 119/56 (21 Oct 2020 09:00) (93/54 - 119/56)  BP(mean): 79 (21 Oct 2020 09:00) (66 - 80)  ABP: 127/44 (21 Oct 2020 10:01) (100/35 - 139/52)  ABP(mean): 65 (21 Oct 2020 10:01) (50 - 73)  RR: 19 (21 Oct 2020 10:01) (15 - 22)  SpO2: 95% (21 Oct 2020 10:01) (71% - 100%)    I&O's Summary    20 Oct 2020 07:01  -  21 Oct 2020 07:00  --------------------------------------------------------  IN: 4392.2 mL / OUT: 0 mL / NET: 4392.2 mL    21 Oct 2020 07:01  -  21 Oct 2020 10:44  --------------------------------------------------------  IN: 652.1 mL / OUT: 0 mL / NET: 652.1 mL      Mode: AC/ CMV (Assist Control/ Continuous Mandatory Ventilation)  RR (machine): 15  TV (machine): 400  FiO2: 80  PEEP: 5  ITime: 1  MAP: 8  PIP: 16      LABS:                        6.7    10.82 )-----------( 351      ( 21 Oct 2020 05:13 )             22.0     10-21    133<L>  |  97  |  60<H>  ----------------------------<  95  4.7   |  16<L>  |  8.49<H>    Ca    6.1<LL>      21 Oct 2020 05:13  Phos  7.7     10-21  Mg     2.8     10-21    TPro  5.6<L>  /  Alb  1.2<L>  /  TBili  <0.2  /  DBili  x   /  AST  22  /  ALT  10  /  AlkPhos  298<H>  10-21        CAPILLARY BLOOD GLUCOSE      POCT Blood Glucose.: 95 mg/dL (21 Oct 2020 06:35)  POCT Blood Glucose.: 113 mg/dL (20 Oct 2020 23:55)  POCT Blood Glucose.: 124 mg/dL (20 Oct 2020 18:59)  POCT Blood Glucose.: 50 mg/dL (20 Oct 2020 17:35)  POCT Blood Glucose.: 56 mg/dL (20 Oct 2020 17:32)  POCT Blood Glucose.: 84 mg/dL (20 Oct 2020 12:26)  POCT Blood Glucose.: 60 mg/dL (20 Oct 2020 11:16)    ABG - ( 20 Oct 2020 23:40 )  pH, Arterial: 7.29  pH, Blood: x     /  pCO2: 33    /  pO2: 168   / HCO3: 15    / Base Excess: -10.3 /  SaO2: 99                  RADIOLOGY & ADDITIONAL TESTS:    Consultant(s) Notes Reviewed:  [x ] YES  [ ] NO    MEDICATIONS  (STANDING):  acyclovir IVPB 200 milliGRAM(s) IV Intermittent every 24 hours  artificial  tears Solution 1 Drop(s) Both EYES every 12 hours  cefTRIAXone   IVPB 2000 milliGRAM(s) IV Intermittent every 12 hours  chlorhexidine 0.12% Liquid 15 milliLiter(s) Oral Mucosa every 12 hours  chlorhexidine 2% Cloths 1 Application(s) Topical <User Schedule>  dextrose 10%. 1000 milliLiter(s) (50 mL/Hr) IV Continuous <Continuous>  dextrose 5%. 1000 milliLiter(s) (50 mL/Hr) IV Continuous <Continuous>  dextrose 50% Injectable 12.5 Gram(s) IV Push once  dextrose 50% Injectable 25 Gram(s) IV Push once  dextrose 50% Injectable 25 Gram(s) IV Push once  fentaNYL   Infusion. 0.5 MICROgram(s)/kG/Hr (2.27 mL/Hr) IV Continuous <Continuous>  heparin   Injectable 5000 Unit(s) SubCutaneous every 12 hours  influenza   Vaccine 0.5 milliLiter(s) IntraMuscular once  insulin lispro (ADMELOG) corrective regimen sliding scale   SubCutaneous every 6 hours  ketamine Infusion 1 mG/kG/Hr (4.09 mL/Hr) IV Continuous <Continuous>  lacosamide IVPB 200 milliGRAM(s) IV Intermittent every 12 hours  levETIRAcetam  IVPB 1000 milliGRAM(s) IV Intermittent every 24 hours  methadone    Tablet 90 milliGRAM(s) Oral daily  midazolam Infusion.. 0.875 mG/kG/Hr (7.16 mL/Hr) IV Continuous <Continuous>  Nephro-dianne 1 Tablet(s) Oral daily  norepinephrine Infusion 0.05 MICROgram(s)/kG/Min (1.92 mL/Hr) IV Continuous <Continuous>  pantoprazole  Injectable 40 milliGRAM(s) IV Push daily  PHENobarbital IVPB 65 milliGRAM(s) IV Intermittent every 6 hours  propofol Infusion. 60 MICROgram(s)/kG/Min (14.7 mL/Hr) IV Continuous <Continuous>  valproate sodium IVPB 1000 milliGRAM(s) IV Intermittent every 12 hours  vancomycin    Solution 125 milliGRAM(s) Oral every 6 hours    MEDICATIONS  (PRN):  acetaminophen    Suspension .. 650 milliGRAM(s) Oral every 6 hours PRN Temp greater or equal to 38C (100.4F)  dextrose 40% Gel 15 Gram(s) Oral once PRN Blood Glucose LESS THAN 70 milliGRAM(s)/deciliter  glucagon  Injectable 1 milliGRAM(s) IntraMuscular once PRN Glucose LESS THAN 70 milligrams/deciliter  petrolatum Ophthalmic Ointment 1 Application(s) Left EYE every 4 hours PRN dry eye      PHYSICAL EXAM:  General: Cachectic man appearing older than stated age, sedated  HEENT: EEG leads in place, dilated pupils 5mm bilaterally equal and reactive to light, no nystagmus  Neck: supple, no nuchal rigidity  Cardiovascular: +S1/S2, RRR, No murmurs, rubs, gallops  Respiratory: Decreased breath sounds in all lung fields, no W/R/R  Gastrointestinal: soft, scaphoid, nondistended, liekly NT as no change in vitals on deep palpation, +BSx4  Extremities: Warm and well perfused, edema of lower extremities, L>R, b/l BKA with dressing in place  Vascular: 2+ radial pulses B/L, 1+ popliteal   Neurological: withdraws from pain, no apparent blink to confrontation. EEG in place and being monitored    Care Discussed with Consultants/Other Providers [ x] YES  [ ] NO

## 2020-10-21 NOTE — PROGRESS NOTE ADULT - SUBJECTIVE AND OBJECTIVE BOX
Patient is a 39y Male seen and evaluated at bedside. Actively seizing. Goals of care discussion being held with family. Rigo.    Meds:    acetaminophen    Suspension .. 650 every 6 hours PRN  acyclovir IVPB 200 every 24 hours  artificial  tears Solution 1 every 12 hours  cefTRIAXone   IVPB 2000 every 12 hours  chlorhexidine 0.12% Liquid 15 every 12 hours  chlorhexidine 2% Cloths 1 <User Schedule>  dextrose 10%. 1000 <Continuous>  dextrose 40% Gel 15 once PRN  dextrose 5%. 1000 <Continuous>  dextrose 50% Injectable 12.5 once  dextrose 50% Injectable 25 once  dextrose 50% Injectable 25 once  fentaNYL   Infusion. 0.5 <Continuous>  glucagon  Injectable 1 once PRN  heparin   Injectable 5000 every 12 hours  influenza   Vaccine 0.5 once  insulin lispro (ADMELOG) corrective regimen sliding scale  every 6 hours  ketamine Infusion 1 <Continuous>  lacosamide IVPB 200 every 12 hours  levETIRAcetam  IVPB 1000 every 24 hours  methadone    Tablet 90 daily  midazolam Infusion.. 0.875 <Continuous>  Nephro-dianne 1 daily  norepinephrine Infusion 0.05 <Continuous>  pantoprazole  Injectable 40 daily  petrolatum Ophthalmic Ointment 1 every 4 hours PRN  PHENobarbital IVPB 65 every 6 hours  propofol Infusion. 60 <Continuous>  valproate sodium IVPB 1000 every 12 hours  vancomycin    Solution 125 every 6 hours      T(C): , Max: 39.7 (10-21-20 @ 07:00)  T(F): , Max: 103.5 (10-21-20 @ 07:00)  HR: 70 (10-21-20 @ 11:00)  BP: 106/57 (10-21-20 @ 11:00)  BP(mean): 75 (10-21-20 @ 11:00)  RR: 16 (10-21-20 @ 11:00)  SpO2: 94% (10-21-20 @ 11:00)  Wt(kg): --    10-20 @ 07:01  -  10-21 @ 07:00  --------------------------------------------------------  IN: 4392.2 mL / OUT: 0 mL / NET: 4392.2 mL    10-21 @ 07:01  -  10-21 @ 11:58  --------------------------------------------------------  IN: 652.1 mL / OUT: 0 mL / NET: 652.1 mL    Review of Systems: Unable to participate    PHYSICAL EXAM:  GENERAL: Intubated, sedated, seizing  CHEST/LUNG: Clear to auscultation bilaterally  HEART: normal S1S2, RRR  ABDOMEN: Soft, Nontender, non distended  EXTREMITIES: B/l amputations, Left LE oedema- non pitting  ACCESS: Right IJ temp cath     LABS:                        6.7    10.82 )-----------( 351      ( 21 Oct 2020 05:13 )             22.0     10-21    133<L>  |  97  |  60<H>  ----------------------------<  95  4.7   |  16<L>  |  8.49<H>    Ca    6.1<LL>      21 Oct 2020 05:13  Phos  7.7     10-21  Mg     2.8     10-21    TPro  5.6<L>  /  Alb  1.2<L>  /  TBili  <0.2  /  DBili  x   /  AST  22  /  ALT  10  /  AlkPhos  298<H>  10-21                RADIOLOGY & ADDITIONAL STUDIES:

## 2020-10-21 NOTE — PROGRESS NOTE ADULT - SUBJECTIVE AND OBJECTIVE BOX
EPILEPSY PROGRESS NOTE:  Subjective:  Patient seen and examined at bedside, and observed with rhythmic oral movements and bilateral arm twitching despite the sedatives and anticonvulsants. Patient was in burst suppression for 4hrs overnight after phenobarbital was loaded but then started having seizure activity clinically and electrographically. Patient remain intubated and sedated on propofol 60mcg/kg/min, fentanyl 1.956mg/kg/hr, versed 0.8557mcg/kg/hr and Ketamine gtt 1mg/kg/hr.     REVIEW OF SYSTEMS:  Unable to obtain 2/2 medically induced coma on propofol, fentanyl. ketamine, versed and phenobarbital    MEDICATIONS  (STANDING):  acyclovir IVPB 200 milliGRAM(s) IV Intermittent every 24 hours  artificial  tears Solution 1 Drop(s) Both EYES every 12 hours  cefTRIAXone   IVPB 2000 milliGRAM(s) IV Intermittent every 12 hours  chlorhexidine 0.12% Liquid 15 milliLiter(s) Oral Mucosa every 12 hours  chlorhexidine 2% Cloths 1 Application(s) Topical <User Schedule>  dextrose 10%. 1000 milliLiter(s) (50 mL/Hr) IV Continuous <Continuous>  dextrose 5%. 1000 milliLiter(s) (50 mL/Hr) IV Continuous <Continuous>  dextrose 50% Injectable 12.5 Gram(s) IV Push once  dextrose 50% Injectable 25 Gram(s) IV Push once  dextrose 50% Injectable 25 Gram(s) IV Push once  fentaNYL   Infusion. 0.5 MICROgram(s)/kG/Hr (2.27 mL/Hr) IV Continuous <Continuous>  heparin   Injectable 5000 Unit(s) SubCutaneous every 12 hours  influenza   Vaccine 0.5 milliLiter(s) IntraMuscular once  insulin lispro (ADMELOG) corrective regimen sliding scale   SubCutaneous every 6 hours  ketamine Infusion 1 mG/kG/Hr (4.09 mL/Hr) IV Continuous <Continuous>  lacosamide IVPB 200 milliGRAM(s) IV Intermittent every 12 hours  levETIRAcetam  IVPB 1000 milliGRAM(s) IV Intermittent every 24 hours  methadone    Tablet 90 milliGRAM(s) Oral daily  midazolam Infusion.. 0.875 mG/kG/Hr (7.16 mL/Hr) IV Continuous <Continuous>  Nephro-dianne 1 Tablet(s) Oral daily  norepinephrine Infusion 0.05 MICROgram(s)/kG/Min (1.92 mL/Hr) IV Continuous <Continuous>  pantoprazole  Injectable 40 milliGRAM(s) IV Push daily  PHENobarbital IVPB 65 milliGRAM(s) IV Intermittent every 6 hours  propofol Infusion. 60 MICROgram(s)/kG/Min (14.7 mL/Hr) IV Continuous <Continuous>  valproate sodium IVPB 1000 milliGRAM(s) IV Intermittent every 12 hours  vancomycin    Solution 125 milliGRAM(s) Oral every 6 hours    MEDICATIONS  (PRN):  acetaminophen    Suspension .. 650 milliGRAM(s) Oral every 6 hours PRN Temp greater or equal to 38C (100.4F)  dextrose 40% Gel 15 Gram(s) Oral once PRN Blood Glucose LESS THAN 70 milliGRAM(s)/deciliter  glucagon  Injectable 1 milliGRAM(s) IntraMuscular once PRN Glucose LESS THAN 70 milligrams/deciliter  petrolatum Ophthalmic Ointment 1 Application(s) Left EYE every 4 hours PRN dry eye    VITAL SIGNS:  T(C): 36.8 (10-21-20 @ 14:00), Max: 39.7 (10-21-20 @ 07:00)  HR: 69 (10-21-20 @ 14:16) (66 - 170)  BP: 124/60 (10-21-20 @ 13:00) (93/54 - 124/60)  RR: 15 (10-21-20 @ 14:16) (15 - 22)  SpO2: 93% (10-21-20 @ 14:16) (71% - 100%)  Wt(kg): --    PHYSICAL EXAM:  Constitutional: Young man appearing older than reported age intubated and in a medically induced coma.   Chest: Clear to bases. 	  Abdomen: soft, and flat  Extremities: B/L LE amputated  Skin: no rash or neurocutaneous signs     Cognitive:  Sedated, and intubated. Unable to elicit responses (PERRLA 2mm brisk bilaterally, negative corneal reflex, gag, and cough. No blink to threat bilaterally.   No Motor activity with deep noxious stimuli. Reflexes 2+ in upper limbs, +BKA amputation.     LABS:  CBC Full  -  ( 21 Oct 2020 05:13 )  WBC Count : 10.82 K/uL  RBC Count : 2.70 M/uL  Hemoglobin : 6.7 g/dL  Hematocrit : 22.0 %  Platelet Count - Automated : 351 K/uL  Mean Cell Volume : 81.5 fl  Mean Cell Hemoglobin : 24.8 pg  Mean Cell Hemoglobin Concentration : 30.5 gm/dL  Auto Neutrophil # : 6.88 K/uL  Auto Lymphocyte # : 3.14 K/uL  Auto Monocyte # : 0.53 K/uL  Auto Eosinophil # : 0.19 K/uL  Auto Basophil # : 0.01 K/uL  Auto Neutrophil % : 63.6 %  Auto Lymphocyte % : 29.0 %  Auto Monocyte % : 4.9 %  Auto Eosinophil % : 1.8 %  Auto Basophil % : 0.1 %    10-21    133<L>  |  97  |  60<H>  ----------------------------<  95  4.7   |  16<L>  |  8.49<H>    Ca    6.1<LL>      21 Oct 2020 05:13  Phos  7.7     10-21  Mg     2.8     10-21    TPro  5.6<L>  /  Alb  1.2<L>  /  TBili  <0.2  /  DBili  x   /  AST  22  /  ALT  10  /  AlkPhos  298<H>  10-21    LIVER FUNCTIONS - ( 21 Oct 2020 05:13 )  Alb: 1.2 g/dL / Pro: 5.6 g/dL / ALK PHOS: 298 U/L / ALT: 10 U/L / AST: 22 U/L / GGT: x

## 2020-10-21 NOTE — PROGRESS NOTE ADULT - REASON FOR ADMISSION
Acute Renal Failure
Renal Failure
Renal failure
nausea fatigue
Nausea vomiting, diarrhea, JEAN PIERRE

## 2020-10-21 NOTE — PROGRESS NOTE ADULT - ATTENDING COMMENTS
Complex psychosocial history with IVDA a/w acute renal failure with hyperkalemia and Cdiff, requiring new HD now on hold. Complicated by acute onset of seizures 4 days ago of unclear etiology; LP with RBCs but HSV PCR negative; not consistent with bacterial process. 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 Seizures have been refractory; now on third line AEDs including phenobarbital. CT head today. Family meeting planned for today to discuss comfort measures at the request of family.

## 2020-10-21 NOTE — CHART NOTE - NSCHARTNOTESELECT_GEN_ALL_CORE
Malnutrition Notification
Event Note
Event Note
Event Note/EPILEPSY NOTE
Nutrition Follow Up/Nutrition Services
Nutrition Services/Follow Up
Nutrition Services/Nutrition Follow Up

## 2020-10-21 NOTE — DISCHARGE NOTE FOR THE EXPIRED PATIENT - HOSPITAL COURSE
40 yo M with PMHx of chronic hepatitis C (untreated), anxiety, polysubstance abuse (K2, IV Heroin, last used 1 week prior to admission, utox also with cocaine and benzos), methadone dependence, spinal cord injury c/b b/l BKA 8-10 years ago, undomiciled and recently incarcerated 6 months ago, presented on 10/12 with nausea and decreased PO intake x 2 months, weight loss of 50 lbs, and worsening generalized fatigue x 2 weeks. Initially admitted to [facility] for acute renal failure requiring urgent HD with HD cath placed and HD initiated on 10/13. Pt was also started on Vancomycin and CFTX for b/l leg cellulitis, then transferred to [facility] on 10/13. Nephrology raised concern for possible tumor lysis due to elevated uric acid level to 24.9, now resolved s/p rasburicase on 10/14. Malignancy workup negative for mass on CT chest/abd/pelvis but positive for biclonal gammopathy for which Heme/Onc is following. C. diff positive on 10/16, started on PO vancomycin. Pt had acute worsening of mental status 10/17, newly AAOx0 only withdrawing to pain with intermittent RUE twitching with CTH 10/17 negative. EEG placed morning of 10/18 followed by witnessed seizure that did not resolve with ativan pushes. Patient was febrile and tachypneic with hypoxia during this episode leading to sedation and intubation, and transferred to the ICU. In the ICU patient was treated for lice and had a new EEG monitor placed. Patient started ppx on Acyclovir and Ceftriaxone for appropriate CNS coverage while attempting to discern etiology of seizures/AMS, in addition to continuing oral vanc. LP not consistent with a bacterial process and showed RBCs but HSV PCR was negative. Seizures refractory to first, second, and third line AEDs including phenobarbital. Levophed was titrated throughout the course to keep MAP >60. Family had a goals of care discussion and opted to make patient DNR/DNI, comfort care only with continuation of all AEDs and sedatives but discontinuation of Levophed. Family was bedside when levophed discontinued. 38 yo M with PMHx of chronic hepatitis C (untreated), anxiety, polysubstance abuse (K2, IV Heroin, last used 1 week prior to admission, utox also with cocaine and benzos), methadone dependence, spinal cord injury c/b b/l BKA 8-10 years ago, undomiciled and recently incarcerated 6 months ago, presented on 10/12 with nausea and decreased PO intake x 2 months, weight loss of 50 lbs, and worsening generalized fatigue x 2 weeks. Initially admitted to [facility] for acute renal failure requiring urgent HD with HD cath placed and HD initiated on 10/13. Pt was also started on Vancomycin and CFTX for b/l leg cellulitis, then transferred to [facility] on 10/13. Nephrology raised concern for possible tumor lysis due to elevated uric acid level to 24.9, now resolved s/p rasburicase on 10/14. Malignancy workup negative for mass on CT chest/abd/pelvis but positive for biclonal gammopathy for which Heme/Onc is following. C. diff positive on 10/16, started on PO vancomycin. Pt had acute worsening of mental status 10/17, newly AAOx0 only withdrawing to pain with intermittent RUE twitching with CTH 10/17 negative. EEG placed morning of 10/18 followed by witnessed seizure that did not resolve with ativan pushes. Patient was febrile and tachypneic with hypoxia during this episode leading to sedation and intubation, and transferred to the ICU. In the ICU patient was treated for lice and had a new EEG monitor placed. Patient started ppx on Acyclovir and Ceftriaxone for appropriate CNS coverage while attempting to discern etiology of seizures/AMS, in addition to continuing oral vanc. LP not consistent with a bacterial process and showed RBCs but HSV PCR was negative. Seizures refractory to first, second, and third line AEDs including phenobarbital. Levophed was titrated throughout the course to keep MAP >60. Family had a goals of care discussion and opted to make patient DNR/DNI, comfort care only, and requested a palliative extubation. Pronounced dead on 10/21/2020 at 1700. 40 yo M with PMHx of chronic hepatitis C (untreated), anxiety, polysubstance abuse (K2, IV Heroin, last used 1 week prior to admission, utox also with cocaine and benzos), methadone dependence, spinal cord injury c/b b/l BKA 8-10 years ago, undomiciled and recently incarcerated 6 months ago, presented on 10/12 with nausea and decreased PO intake x 2 months, weight loss of 50 lbs, and worsening generalized fatigue x 2 weeks. Initially admitted to [facility] for acute renal failure requiring urgent HD with HD cath placed and HD initiated on 10/13. Pt was also started on Vancomycin and CFTX for b/l leg cellulitis, then transferred to [facility] on 10/13. Nephrology raised concern for possible tumor lysis due to elevated uric acid level to 24.9, now resolved s/p rasburicase on 10/14. Malignancy workup negative for mass on CT chest/abd/pelvis but positive for biclonal gammopathy for which Heme/Onc is following. C. diff positive on 10/16, started on PO vancomycin. Pt had acute worsening of mental status 10/17, newly AAOx0 only withdrawing to pain with intermittent RUE twitching with CTH 10/17 negative. EEG placed morning of 10/18 followed by witnessed seizure that did not resolve with ativan pushes. Patient was febrile and tachypneic with hypoxia during this episode leading to sedation and intubation, and transferred to the ICU. In the ICU patient was treated for lice and had a new EEG monitor placed. Patient started ppx on Acyclovir and Ceftriaxone for appropriate CNS coverage while attempting to discern etiology of seizures/AMS, in addition to continuing oral vanc. LP not consistent with a bacterial process and showed RBCs but HSV PCR was negative. Seizures refractory to first, second, and third line AEDs including phenobarbital. Levophed was titrated throughout the course to keep MAP >60. Family had a goals of care discussion and opted to make patient DNR/DNI, comfort care only, and requested a palliative extubation. Levophed was removed while patient on multiple antiepileptic drips while intubated and passed peacefully. Pronounced dead on 10/21/2020 at 1700. On exam the patient did not respond to verbal or physical stimuli. Absent heart and breath sounds. Absent peripheral pulses. Pupils are fixed and dilated. Family notified. Autopsy offered and family accepted. Cause of death cardiopulmonary arrest 2/2 to status epilepticus and end stage renal failure.    40 yo M with PMHx of chronic hepatitis C (untreated), anxiety, polysubstance abuse (K2, IV Heroin, last used 1 week prior to admission, utox also with cocaine and benzos), methadone dependence, spinal cord injury c/b b/l BKA 8-10 years ago, undomiciled and recently incarcerated 6 months ago, presented on 10/12 with nausea and decreased PO intake x 2 months, weight loss of 50 lbs, and worsening generalized fatigue x 2 weeks. Initially admitted to [facility] for acute renal failure requiring urgent HD with HD cath placed and HD initiated on 10/13. Pt was also started on Vancomycin and CFTX for b/l leg cellulitis, then transferred to [facility] on 10/13. Nephrology raised concern for possible tumor lysis due to elevated uric acid level to 24.9, now resolved s/p rasburicase on 10/14. Malignancy workup negative for mass on CT chest/abd/pelvis but positive for biclonal gammopathy for which Heme/Onc is following. C. diff positive on 10/16, started on PO vancomycin. Pt had acute worsening of mental status 10/17, newly AAOx0 only withdrawing to pain with intermittent RUE twitching with CTH 10/17 negative. EEG placed morning of 10/18 followed by witnessed seizure that did not resolve with ativan pushes. Patient was febrile and tachypneic with hypoxia during this episode leading to sedation and intubation, and transferred to the ICU. In the ICU patient was treated for lice and had a new EEG monitor placed. Patient started ppx on Acyclovir and Ceftriaxone for appropriate CNS coverage while attempting to discern etiology of seizures/AMS, in addition to continuing oral vanc. LP not consistent with a bacterial process and showed RBCs but HSV PCR was negative. Seizures refractory to first, second, and third line AEDs including phenobarbital. Levophed was titrated throughout the course to keep MAP >60. Family had a goals of care discussion and opted to make patient DNR/DNI, comfort care only, and requested a palliative extubation. Levophed was removed while patient on multiple antiepileptic drips while intubated and passed peacefully. Pronounced dead on 10/21/2020 at 1700. On exam the patient did not respond to verbal or physical stimuli. Absent heart and breath sounds. Absent peripheral pulses. Pupils are fixed and dilated. Family notified. Autopsy offered and family accepted. Cause of death cardiopulmonary arrest 2/2 to status epilepticus due to unknown etiology and end stage renal failure.

## 2020-10-21 NOTE — PROGRESS NOTE ADULT - ASSESSMENT
39M with PMHx of chronic hepatitis C (untreated), anxiety, polysubstance abuse (K2, IV Heroin, last used 1 week prior to admission, utox also with cocaine and benzos), methadone dependence, spinal cord injury c/b b/l BKA 8-10 years ago, undomiciled recently incarcerated 6 months ago, presented on 10/12 with nausea and decreased PO intake x 2 months, weight loss of 50 lbs, and worsening generalized fatigue x 2 weeks. Admitted for acute renal failure, uremia and hyperkalemia. S/p hemodialysis and pending further workup for nephropathy. Course c/b C. diff infection and intractable seizures.      NEURO:  #Seizure of unknown origin, intractable, status epilepticus.  CT head negative on 10/17 following episode of AMS. EEG placed morning of 10/18, positive for tonic clonic seizure at 9am. S/p intubation 10/18 for airway protection in setting of seizure and transfer to MICU. S/P Propofol, versed, and fentanyl for sedation with intubation. Patient began seizing this AM, 10/20, and has required constant titration of sedatives/antiepileptics with eventual cessation of physical seizure activity after introduction of phenobarbital.  - Patient will have bedside CTH in AM to assess, cannot go for MRI 2/2 multiple drips required.  - Lacosamide 200 BID, Keppra 1000 mg daily, Valproate 1000 mg BID, Versed drip .489 mg/kg/hr or 20 ml/hr, Phenobarbital 65 mg BID, Propofol 60 mcg/kg/min or 14.7 mL/hr, Magnesium 4 g, fentanyl 1.956 mg/kg/hr or 8  - Levophed started for pressure support at 0.588 mcg/kg/min currently, adjusted for MAP 60   - EDIT: patient with seizure activity again at 5 pm, restarted ketamine drip at 1 mg/kg/hr or 4.09 ml/hr  - started Acyclovir 200 daily for CNS coverage, out of concern for HSV encephalitis  - started Ceftriaxone 2000 mg daily for bacterial meningitis coverage    #Altered mental status, CT head negative on 10/17. Undergoing workup for underlying cause of seizure including worsening infection. LP only positive for RBCs but clear in color, culture negative.  - Patient will have bedside CTH in AM to assess, cannot go for MRI 2/2 multiple drips required.  - started Acyclovir 200 daily for CNS coverage, out of concern for HSV encephalitis  - started Ceftriaxone 2000 mg daily for bacterial meningitis coverage    PULM:  Patient intubated for airway protection  - sedated on regimen for seizure control, see above  - AC/CMV with FiO2 40%, PEEP 5, and RR 15    CARDIO:  Elevated troponin i/s/o ARF with no ischemic changes on EKG and elevated proBNP of 7428. Likely demand ischemia  -Trops measured twice at 0.23 but not trended, f/u trop with AM lab for resolution  -TTE normal with EF 64%.  -levophed for BP support while sedated with goal MAP 60     GI:  #C Diff positive, xray and CT negative for malignancy or obstruction  - c/w vanc oral solution 125 mg Q6  - continue contact precautions.     #Elevated alkaline phosphatase level, i/s/o elevated GGT and chronic Hep C, possible intrahepatic cholestasis  - Alk phos continues to be elevated, today 527  - Bilirubin level has been normal since admission  - RUQ US negative for biliary pathology, bile ducts not dilated, echogenic portal triads consistent with chronic hepC infection  - Sent multiple myeloma workup  - continue to trend Alkphos and monitor for any symptoms.     # Chronic Hepatitis C, with hep B and HIV neg, RUQ US shows echogenic portal triads  - Elevated Alkphos, AST/ALT wnl   - elev   - continue to trend LFTs, management as per above.    ENDO:  Last A1c 5.5, ISS with continuous tube feeds    RENAL:  #ARF with uraemia, hyperphosphataemia, and increased uric acid. Oliguric/anuric, likely 2/2 acute urate nephropathy  - renal saw today and did not recommend HD  - pt with nephrotic range proteinuria, r/o GN and paraproteinemia per Nephro  - Trend Cr, Uric Acid, Phosphate  - reglan PRN nausea, vomiting  - Lead level wnl for environmental exposure  - Repeat UTox    #HAGMA with metabolic alkalosis likely 2/2 renal failure and C Diff volume loss    ID:  LLE cellulitis 2/2 ulceration. s/p bilateral BKA, RLE w/ probable chronic limb ischemia and LLE concerning for cellulitis  - LE dopplers r/o DVT   - s/p vancomycin and zosyn in the ED, plus Vanc 1000 mg IV one time 10/18    #UTI   - UA+ but no UCx resulted  - F/U UCx    HEME:  #Normocytic anemia most likely 2/2 ACD vs ALEX in setting of renal dysfunction. No signs of acute blood loss.   - B12 elevated 1793 and Folate 13, iron studies consistent with mixed picture ALEX and ACD   - Transfuse Hg<7  - Maintain active Type and Screen.     SKIN:  #Lice found on head and pubic hair on admission to MICU, EEG removed for treatment  -permethrin 5% cream to all hair    PSYCHOSOCIAL:  #Polysubstance abuse with recent incarceration, endorses cocaine, heroin (5 days ago) and K2 use. Now on methadone 90mg qd.   - Utox on admission positive for opioids, cocaine, benzos and methadone   - confirmed methadone dosing 90mg qd - [phone] [facility] [address], [place] - [id number]  - C/w methadone 90mg qd  - Avoid beta blockers  - HIV neg, positive Hep C antibody   - TB quant negative   - Monitor for signs of withdrawal, on CIWA protocol for now  - patient endorses IV drug use with no specific location of use, continue to treat patient with Vancomycin and Ceftriaxone for endocarditis prophylaxis in the setting of cellulitis as stated above.     ACCESS/LINES:  2 Peripheral IV lines, L sided Central line, A line      PREVENTATIVE:  F: Replete PRN  E: Replete K<4, Mg<2  N: NGT with continuous enteral feeds  DVT PPx: Heparin 5000 SubQ Q12  GI PPx: None  DISPO: MICU    FULL CODE           39M with PMHx of chronic hepatitis C (untreated), anxiety, polysubstance abuse (K2, IV Heroin, last used 1 week prior to admission, utox also with cocaine and benzos), methadone dependence, spinal cord injury c/b b/l BKA 8-10 years ago, undomiciled recently incarcerated 6 months ago, presented on 10/12 with nausea and decreased PO intake x 2 months, weight loss of 50 lbs, and worsening generalized fatigue x 2 weeks. Admitted for acute renal failure, uremia and hyperkalemia. S/p hemodialysis and pending further workup for nephropathy. Course c/b C. diff infection and intractable seizures.      NEURO:  #Seizure of unknown origin, intractable, status epilepticus.  CT head negative on 10/17 following episode of AMS. EEG placed morning of 10/18, positive for tonic clonic seizure at 9am. S/p intubation 10/18 for airway protection in setting of seizure and transfer to MICU. S/P Propofol, versed, and fentanyl for sedation with intubation. Patient began seizing this AM, 10/20, and has required constant titration of sedatives/antiepileptics with eventual cessation of physical seizure activity after introduction of phenobarbital.  - CT bedside this AM showed diffuse effacement of the sulci  - Lacosamide 200 BID, Keppra 1000 mg daily, Valproate 1000 mg BID, Versed drip .489 mg/kg/hr or 20 ml/hr, Propofol 60 mcg/kg/min or 14.7 mL/hr, Magnesium 4 g, fentanyl 1.956 mg/kg/hr or 8, Ketamine at 4 ml/hr, Phenobarbital 10 mg/hr  - Levophed started for pressure support at 40 currently, adjusted for MAP 60   - started Acyclovir 200 daily for CNS coverage, out of concern for HSV encephalitis  - started Ceftriaxone 2000 mg daily for bacterial meningitis coverage    #Altered mental status, CT head negative on 10/17. Undergoing workup for underlying cause of seizure including worsening infection. LP only positive for RBCs but clear in color, culture negative.  - CTH bedside showed diffuse effacement of the sulci, GOC discussion this afternoon  - started Acyclovir 200 daily for CNS coverage, out of concern for HSV encephalitis  - started Ceftriaxone 2000 mg daily for bacterial meningitis coverage    PULM:  Patient intubated for airway protection  - sedated on regimen for seizure control, see above  - AC/CMV with FiO2 40%, PEEP 5, and RR 15    CARDIO:  Elevated troponin i/s/o ARF with no ischemic changes on EKG and elevated proBNP of 7428. Likely demand ischemia  -Trops measured twice at 0.23 but not trended, f/u trop with AM lab for resolution  -TTE normal with EF 64%.  -levophed for BP support while sedated with goal MAP 60     GI:  #C Diff positive, xray and CT negative for malignancy or obstruction  - c/w vanc oral solution 125 mg Q6, day 6  - continue contact precautions.     #Elevated alkaline phosphatase level, i/s/o elevated GGT and chronic Hep C, possible intrahepatic cholestasis  - Alk phos continues to be elevated, today 527  - Bilirubin level has been normal since admission  - RUQ US negative for biliary pathology, bile ducts not dilated, echogenic portal triads consistent with chronic hepC infection  - Sent multiple myeloma workup  - continue to trend Alkphos and monitor for any symptoms.     # Chronic Hepatitis C, with hep B and HIV neg, RUQ US shows echogenic portal triads  - Elevated Alkphos, AST/ALT wnl   - elev   - continue to trend LFTs, management as per above.    ENDO:  Last A1c 5.5, ISS with continuous tube feeds    RENAL:  #ARF with uraemia, hyperphosphataemia, and increased uric acid. Oliguric/anuric, likely 2/2 acute urate nephropathy  - renal saw today and did not recommend HD  - pt with nephrotic range proteinuria, r/o GN and paraproteinemia per Nephro  - Trend Cr, Uric Acid, Phosphate  - reglan PRN nausea, vomiting  - Lead level wnl for environmental exposure  - Repeat UTox    #HAGMA with metabolic alkalosis likely 2/2 renal failure and C Diff volume loss    ID:  LLE cellulitis 2/2 ulceration. s/p bilateral BKA, RLE w/ probable chronic limb ischemia and LLE concerning for cellulitis  - LE dopplers r/o DVT   - s/p vancomycin and zosyn in the ED, plus Vanc 1000 mg IV one time 10/18    #UTI   - UA+ but no UCx resulted  - F/U UCx    HEME:  #Normocytic anemia most likely 2/2 ACD vs ALEX in setting of renal dysfunction. No signs of acute blood loss.   - B12 elevated 1793 and Folate 13, iron studies consistent with mixed picture ALEX and ACD   - Transfuse Hg<7  - Maintain active Type and Screen.     SKIN:  #Lice found on head and pubic hair on admission to MICU, EEG removed for treatment  -permethrin 5% cream to all hair    PSYCHOSOCIAL:  #Polysubstance abuse with recent incarceration, endorses cocaine, heroin (5 days ago) and K2 use. Now on methadone 90mg qd.   - Utox on admission positive for opioids, cocaine, benzos and methadone   - confirmed methadone dosing 90mg qd - [phone] [facility] [address], [place] - [id number]  - C/w methadone 90mg qd  - Avoid beta blockers  - HIV neg, positive Hep C antibody   - TB quant negative   - Monitor for signs of withdrawal, on CIWA protocol for now  - patient endorses IV drug use with no specific location of use, continue to treat patient with Vancomycin and Ceftriaxone for endocarditis prophylaxis in the setting of cellulitis as stated above.     ACCESS/LINES:  2 Peripheral IV lines, L sided Central line, A line      PREVENTATIVE:  F: Replete PRN  E: Replete K<4, Mg<2  N: NGT with continuous enteral feeds  DVT PPx: Heparin 5000 SubQ Q12  GI PPx: None  DISPO: MICU    FULL CODE

## 2020-10-21 NOTE — PROGRESS NOTE ADULT - ASSESSMENT
39M PMHx heroin abuse on methadone x6 months, smokes K2 daily, b/l BKA following an accident, presents with ARF, uraemia, hyperphosphataemia and increased uric acid. Unclear aetiology, GN w/u not precluding to any diagnosis. Plans for renal bx deferred in v/o decompensation.    Currently intubated, sedated, seizing. FiO2 requirements increasing. Anuric.   Some literature reports show seizures causing hyperuricaemia in absence of rhabdo  Acute urate nephropathy likely aetiology of renal failure    #Acute renal failure, uraemia, oliguria-anuria possibly 2/2 to acute urate nephropathy  #R/o GN, paraproteinaemia  #Nephrotic range proteinuria  S/p RIJ temp cath 10/13  S/p first HD 10/13; 2nd 10/14 (last HD session)    No HD today, conversations per team leading to withdrawal of care  Electrolytes being managed medically  HAGMA with met alkalosis persists- likely from renal failure and cdif diarrhoea; lac acidosis resolved  Lead level wnl; hypovolaemic  Pt actively seizing, being titrated on various antiepileptics    Renal sono: normal length, increased echogenicity  Polysubstance abuse- cocaine, methadone, opiate, benzo  UA +blood, RBCs, prot, bacteria; FeNa 9.01%; Urine PCR 24.6    2 IgG lambda bands identified, kappa/lambda ration 0.61; 2 weak gammaproteins in urine  Haem/onc involved; S/p rasburicase 6mg dose 10/14- uric acid down to 1.4  PLA2R pending    #Anaemia- iron sat 35%, ferritin 280s  #BMD- hyperphos, hypocalcaemic

## 2020-10-21 NOTE — PROGRESS NOTE ADULT - ASSESSMENT
39 year-old undomiciled male with PMHx of HCV (untreated), drug use (K2, IV Heroin, last used 1 week prior to admission, utox also with cocaine and benzos), methadone dependence, spinal cord injury c/b b/l BKA who was admitted for acute renal failure of unclear etiology on 10/12/2020.     Course complicated w/ AMS and altered level of consciousness requiring intubation, and now in refractory status epilepticus. Unclear etiology for status epilepticus given no personal history. Differential diagnosis include metabolic derangement coupled with provoking agent (cocaine seen on toxicology screen) vs structural defect that warrant further diagnostic tests. However, given that patient is now comfort care would defer imagings, and continue current AED regimen.      Recommendations:  - Continue vEEG monitoring  - Continue Vimpat 200mg Q12hrs  - Continue Keppra 1000mg Q12hrs  - Continue Versed 0.8557mcg/kg/hr  - Continue Phenobarbital 65mg Q12hrs   - Continue Depakote 1000mg Q12hrs  - Maintain seizure and fall precautions

## 2020-10-21 NOTE — CHART NOTE - NSCHARTNOTEFT_GEN_A_CORE
Admitting Diagnosis:   Patient is a 39y old  Male who presents with a chief complaint of Renal failure (21 Oct 2020 10:43)      PAST MEDICAL & SURGICAL HISTORY:  Drug abuse  K2    Heroin abuse    Above-knee amputation of right lower extremity    Above-knee amputation of left lower extremity      Current Nutrition Order:  Vital High Protein @ 31ml/hr x 24hrs via NGT. Provides: 744ml TV, 744kcal (1132kcal w/propofol), 65.1g protein, 622ml free H2O, 52% RDI, 1.76g/kg ABW  D10% running at 50ml/hr    PO Intake: Good (%) [   ]  Fair (50-75%) [   ] Poor (<25%) [   ]- NA NPO    GI Issues: Unable to assess at this time 2/2 vent; sedated  No regurgitation overnight. <500cc GRV   BM 10/20- being treated for C. diff    Pain: Unable to assess at this time 2/2 vent; sedated    Skin Integrity: Waldo 11, no recorded edema  B/L BKA venous ulcers     Labs:   10-21    133<L>  |  97  |  60<H>  ----------------------------<  95  4.7   |  16<L>  |  8.49<H>    Ca    6.1<LL>      21 Oct 2020 05:13  Phos  7.7     10-21  Mg     2.8     10-21    TPro  5.6<L>  /  Alb  1.2<L>  /  TBili  <0.2  /  DBili  x   /  AST  22  /  ALT  10  /  AlkPhos  298<H>  10-21    CAPILLARY BLOOD GLUCOSE      POCT Blood Glucose.: 99 mg/dL (21 Oct 2020 11:10)  POCT Blood Glucose.: 95 mg/dL (21 Oct 2020 06:35)  POCT Blood Glucose.: 113 mg/dL (20 Oct 2020 23:55)  POCT Blood Glucose.: 124 mg/dL (20 Oct 2020 18:59)  POCT Blood Glucose.: 50 mg/dL (20 Oct 2020 17:35)  POCT Blood Glucose.: 56 mg/dL (20 Oct 2020 17:32)  POCT Blood Glucose.: 84 mg/dL (20 Oct 2020 12:26)      Medications:  MEDICATIONS  (STANDING):  acyclovir IVPB 200 milliGRAM(s) IV Intermittent every 24 hours  artificial  tears Solution 1 Drop(s) Both EYES every 12 hours  cefTRIAXone   IVPB 2000 milliGRAM(s) IV Intermittent every 12 hours  chlorhexidine 0.12% Liquid 15 milliLiter(s) Oral Mucosa every 12 hours  chlorhexidine 2% Cloths 1 Application(s) Topical <User Schedule>  dextrose 10%. 1000 milliLiter(s) (50 mL/Hr) IV Continuous <Continuous>  dextrose 5%. 1000 milliLiter(s) (50 mL/Hr) IV Continuous <Continuous>  dextrose 50% Injectable 12.5 Gram(s) IV Push once  dextrose 50% Injectable 25 Gram(s) IV Push once  dextrose 50% Injectable 25 Gram(s) IV Push once  fentaNYL   Infusion. 0.5 MICROgram(s)/kG/Hr (2.27 mL/Hr) IV Continuous <Continuous>  heparin   Injectable 5000 Unit(s) SubCutaneous every 12 hours  influenza   Vaccine 0.5 milliLiter(s) IntraMuscular once  insulin lispro (ADMELOG) corrective regimen sliding scale   SubCutaneous every 6 hours  ketamine Infusion 1 mG/kG/Hr (4.09 mL/Hr) IV Continuous <Continuous>  lacosamide IVPB 200 milliGRAM(s) IV Intermittent every 12 hours  levETIRAcetam  IVPB 1000 milliGRAM(s) IV Intermittent every 24 hours  methadone    Tablet 90 milliGRAM(s) Oral daily  midazolam Infusion.. 0.875 mG/kG/Hr (7.16 mL/Hr) IV Continuous <Continuous>  Nephro-dianne 1 Tablet(s) Oral daily  norepinephrine Infusion 0.05 MICROgram(s)/kG/Min (1.92 mL/Hr) IV Continuous <Continuous>  pantoprazole  Injectable 40 milliGRAM(s) IV Push daily  PHENobarbital IVPB 65 milliGRAM(s) IV Intermittent every 6 hours  propofol Infusion. 60 MICROgram(s)/kG/Min (14.7 mL/Hr) IV Continuous <Continuous>  valproate sodium IVPB 1000 milliGRAM(s) IV Intermittent every 12 hours  vancomycin    Solution 125 milliGRAM(s) Oral every 6 hours    MEDICATIONS  (PRN):  acetaminophen    Suspension .. 650 milliGRAM(s) Oral every 6 hours PRN Temp greater or equal to 38C (100.4F)  dextrose 40% Gel 15 Gram(s) Oral once PRN Blood Glucose LESS THAN 70 milliGRAM(s)/deciliter  glucagon  Injectable 1 milliGRAM(s) IntraMuscular once PRN Glucose LESS THAN 70 milligrams/deciliter  petrolatum Ophthalmic Ointment 1 Application(s) Left EYE every 4 hours PRN dry eye      Admission Anthropometrics:  Height: 66" Weight: 100lbs, Adjusted IBW 2/2 BKA (5.9%) 134lbs+/-10%, %IBW 75%, Adjusted BMI 17.1    Weight:  10/13 37.7kg/38.7kg  10/14 36.0kg/37.0kg  10/18 40.9kg (unsure where this weight came from)    Weight Change: Note ~6lbs wt loss most likely 2/2 fluid shifts on HD. Please continue to trend wts.     Estimated energy needs:   ABW used for calculations as pt <75% adjusted IBW, adjusted for malnutrition, vent, HD. Fluids per team 2/2 HD  Calories: 25-30 kcal/kg = 925-1110 kcal/day  Protein: 1.6-1.8 g/kg = 59-67g protein/day  Fluids per team     Subjective:   39M Undomiciled PMH Heroin abuse (on methadone) with spinal cord injury c/b b/l BKA 8-10 years ago ([facility]) presenting with 2 months of nausea, vomiting, and diarrhea, as well as 2 weeks of worsening fatigue, is found to be uremic and hyperkalemic. Now on hemodialysis and being managed for LLE cellulitis and determination of presenting JEAN PIERRE etiology. Found to be C. diff positive, and being treated with vancomycin. On 10/18 he experienced seizure-like activity with tachycardia and hypoxia, requiring intubation. Ordered for vEEG monitoring but delay in placing leads d/t lice. Continues to have witnessed seizures, and now EEG monitoring ongoing. GOC discussion held w/HCP and palliative team on 10/19, and pt is now DNR/DNI, but no de-escalation of care. Pending CTH this morning. Pt seen in room. He remains intubated on VC/AC mode, sedated on propofol @ 14.7ml/hr (388kcal/day from lipids), fentanyl, precedex, versed, and ketamine. MAP 65- on levophed for BP support. NPO w/EN running at goal of 31ml/hr with no s/s intolerance. Last BM 10/20 in AM. Temp of 100.5F this AM. Planned for HD session today.  (H), Phos 7.7 (H), POC BG 95, 113mg/dL. Will continue to follow per RD protocol.      Previous Nutrition Diagnosis: Suspect severe PCM RT poor PO intake 2/2 nausea/vomiting AEB reported wt loss, muscle and fat loss, likely meeting <75% EER > 1 month    Active [ x ]  Resolved [   ]    If resolved, new PES:     Goal: Meet >75% EER via tolerated route; pt to show no further signs/symptoms of protein-calorie malnutrition; nutrition will be kept aligned with GOC at all times     Recommendations:  1. Keep nutrition aligned with GOC at all times   2. With worsening renal fxn, consider changing feeds to Nepro @ 20ml/hr x 24hrs plus 1 ProStat via NGT. Provides: 480ml TV, 964kcal (1252kcal w/propofol), 54g pro, 349ml free H2O, 51% RDI, 1.3g/kg ABW protein.   3. Monitor lytes and replete prn. Recommend addition of Phos binder  4. Pain and bowel regimens per team  5. Trend dry wts post HD  *d/w team    Education: Not indicated at this time 2/2 intubation, sedation     Risk Level: High [ x ] Moderate [   ] Low [   ]

## 2020-10-23 LAB
CULTURE RESULTS: SIGNIFICANT CHANGE UP
CULTURE RESULTS: SIGNIFICANT CHANGE UP
SPECIMEN SOURCE: SIGNIFICANT CHANGE UP
SPECIMEN SOURCE: SIGNIFICANT CHANGE UP

## 2020-10-24 LAB — HCV GENTYP BLD NAA+PROBE: SIGNIFICANT CHANGE UP

## 2020-10-26 ENCOUNTER — RESULT REVIEW (OUTPATIENT)
Age: 40
End: 2020-10-26

## 2020-10-26 PROCEDURE — 99285 EMERGENCY DEPT VISIT HI MDM: CPT | Mod: 25

## 2020-10-26 PROCEDURE — 84165 PROTEIN E-PHORESIS SERUM: CPT

## 2020-10-26 PROCEDURE — 84425 ASSAY OF VITAMIN B-1: CPT

## 2020-10-26 PROCEDURE — 86140 C-REACTIVE PROTEIN: CPT

## 2020-10-26 PROCEDURE — 87046 STOOL CULTR AEROBIC BACT EA: CPT

## 2020-10-26 PROCEDURE — 86923 COMPATIBILITY TEST ELECTRIC: CPT

## 2020-10-26 PROCEDURE — 94002 VENT MGMT INPAT INIT DAY: CPT

## 2020-10-26 PROCEDURE — 80061 LIPID PANEL: CPT

## 2020-10-26 PROCEDURE — 82962 GLUCOSE BLOOD TEST: CPT

## 2020-10-26 PROCEDURE — 80307 DRUG TEST PRSMV CHEM ANLYZR: CPT

## 2020-10-26 PROCEDURE — 93306 TTE W/DOPPLER COMPLETE: CPT

## 2020-10-26 PROCEDURE — 80048 BASIC METABOLIC PNL TOTAL CA: CPT

## 2020-10-26 PROCEDURE — 82607 VITAMIN B-12: CPT

## 2020-10-26 PROCEDURE — 82728 ASSAY OF FERRITIN: CPT

## 2020-10-26 PROCEDURE — 87205 SMEAR GRAM STAIN: CPT

## 2020-10-26 PROCEDURE — 82436 ASSAY OF URINE CHLORIDE: CPT

## 2020-10-26 PROCEDURE — 76775 US EXAM ABDO BACK WALL LIM: CPT

## 2020-10-26 PROCEDURE — 87070 CULTURE OTHR SPECIMN AEROBIC: CPT

## 2020-10-26 PROCEDURE — 88312 SPECIAL STAINS GROUP 1: CPT

## 2020-10-26 PROCEDURE — 90935 HEMODIALYSIS ONE EVALUATION: CPT

## 2020-10-26 PROCEDURE — 82010 KETONE BODYS QUAN: CPT

## 2020-10-26 PROCEDURE — C1752: CPT

## 2020-10-26 PROCEDURE — 95700 EEG CONT REC W/VID EEG TECH: CPT

## 2020-10-26 PROCEDURE — C1769: CPT

## 2020-10-26 PROCEDURE — 86334 IMMUNOFIX E-PHORESIS SERUM: CPT

## 2020-10-26 PROCEDURE — 86036 ANCA SCREEN EACH ANTIBODY: CPT

## 2020-10-26 PROCEDURE — 87340 HEPATITIS B SURFACE AG IA: CPT

## 2020-10-26 PROCEDURE — 84295 ASSAY OF SERUM SODIUM: CPT

## 2020-10-26 PROCEDURE — 88313 SPECIAL STAINS GROUP 2: CPT

## 2020-10-26 PROCEDURE — 51702 INSERT TEMP BLADDER CATH: CPT | Mod: XU

## 2020-10-26 PROCEDURE — 86709 HEPATITIS A IGM ANTIBODY: CPT

## 2020-10-26 PROCEDURE — 84145 PROCALCITONIN (PCT): CPT

## 2020-10-26 PROCEDURE — 83986 ASSAY PH BODY FLUID NOS: CPT

## 2020-10-26 PROCEDURE — 95714 VEEG EA 12-26 HR UNMNTR: CPT

## 2020-10-26 PROCEDURE — 85045 AUTOMATED RETICULOCYTE COUNT: CPT

## 2020-10-26 PROCEDURE — 70450 CT HEAD/BRAIN W/O DYE: CPT

## 2020-10-26 PROCEDURE — 87040 BLOOD CULTURE FOR BACTERIA: CPT

## 2020-10-26 PROCEDURE — 84443 ASSAY THYROID STIM HORMONE: CPT

## 2020-10-26 PROCEDURE — 86900 BLOOD TYPING SEROLOGIC ABO: CPT

## 2020-10-26 PROCEDURE — 94003 VENT MGMT INPAT SUBQ DAY: CPT

## 2020-10-26 PROCEDURE — 82140 ASSAY OF AMMONIA: CPT

## 2020-10-26 PROCEDURE — 83935 ASSAY OF URINE OSMOLALITY: CPT

## 2020-10-26 PROCEDURE — 84484 ASSAY OF TROPONIN QUANT: CPT

## 2020-10-26 PROCEDURE — 86901 BLOOD TYPING SEROLOGIC RH(D): CPT

## 2020-10-26 PROCEDURE — 84100 ASSAY OF PHOSPHORUS: CPT

## 2020-10-26 PROCEDURE — 83735 ASSAY OF MAGNESIUM: CPT

## 2020-10-26 PROCEDURE — 76937 US GUIDE VASCULAR ACCESS: CPT

## 2020-10-26 PROCEDURE — 86850 RBC ANTIBODY SCREEN: CPT

## 2020-10-26 PROCEDURE — 84300 ASSAY OF URINE SODIUM: CPT

## 2020-10-26 PROCEDURE — 84480 ASSAY TRIIODOTHYRONINE (T3): CPT

## 2020-10-26 PROCEDURE — 77001 FLUOROGUIDE FOR VEIN DEVICE: CPT

## 2020-10-26 PROCEDURE — 82945 GLUCOSE OTHER FLUID: CPT

## 2020-10-26 PROCEDURE — 85007 BL SMEAR W/DIFF WBC COUNT: CPT

## 2020-10-26 PROCEDURE — 87324 CLOSTRIDIUM AG IA: CPT

## 2020-10-26 PROCEDURE — 87389 HIV-1 AG W/HIV-1&-2 AB AG IA: CPT

## 2020-10-26 PROCEDURE — 96375 TX/PRO/DX INJ NEW DRUG ADDON: CPT | Mod: XU

## 2020-10-26 PROCEDURE — 85730 THROMBOPLASTIN TIME PARTIAL: CPT

## 2020-10-26 PROCEDURE — 86480 TB TEST CELL IMMUN MEASURE: CPT

## 2020-10-26 PROCEDURE — 83690 ASSAY OF LIPASE: CPT

## 2020-10-26 PROCEDURE — 83521 IG LIGHT CHAINS FREE EACH: CPT

## 2020-10-26 PROCEDURE — 82310 ASSAY OF CALCIUM: CPT

## 2020-10-26 PROCEDURE — 83540 ASSAY OF IRON: CPT

## 2020-10-26 PROCEDURE — 86038 ANTINUCLEAR ANTIBODIES: CPT

## 2020-10-26 PROCEDURE — 81001 URINALYSIS AUTO W/SCOPE: CPT

## 2020-10-26 PROCEDURE — 71045 X-RAY EXAM CHEST 1 VIEW: CPT

## 2020-10-26 PROCEDURE — 86160 COMPLEMENT ANTIGEN: CPT

## 2020-10-26 PROCEDURE — 83605 ASSAY OF LACTIC ACID: CPT

## 2020-10-26 PROCEDURE — 82550 ASSAY OF CK (CPK): CPT

## 2020-10-26 PROCEDURE — 84550 ASSAY OF BLOOD/URIC ACID: CPT

## 2020-10-26 PROCEDURE — 74177 CT ABD & PELVIS W/CONTRAST: CPT

## 2020-10-26 PROCEDURE — 93971 EXTREMITY STUDY: CPT

## 2020-10-26 PROCEDURE — 80202 ASSAY OF VANCOMYCIN: CPT

## 2020-10-26 PROCEDURE — 87177 OVA AND PARASITES SMEARS: CPT

## 2020-10-26 PROCEDURE — 86706 HEP B SURFACE ANTIBODY: CPT

## 2020-10-26 PROCEDURE — 83036 HEMOGLOBIN GLYCOSYLATED A1C: CPT

## 2020-10-26 PROCEDURE — 83880 ASSAY OF NATRIURETIC PEPTIDE: CPT

## 2020-10-26 PROCEDURE — 80164 ASSAY DIPROPYLACETIC ACD TOT: CPT

## 2020-10-26 PROCEDURE — 85027 COMPLETE CBC AUTOMATED: CPT

## 2020-10-26 PROCEDURE — 80053 COMPREHEN METABOLIC PANEL: CPT

## 2020-10-26 PROCEDURE — 36415 COLL VENOUS BLD VENIPUNCTURE: CPT

## 2020-10-26 PROCEDURE — 86920 COMPATIBILITY TEST SPIN: CPT

## 2020-10-26 PROCEDURE — 93975 VASCULAR STUDY: CPT

## 2020-10-26 PROCEDURE — 84478 ASSAY OF TRIGLYCERIDES: CPT

## 2020-10-26 PROCEDURE — 87507 IADNA-DNA/RNA PROBE TQ 12-25: CPT

## 2020-10-26 PROCEDURE — 82746 ASSAY OF FOLIC ACID SERUM: CPT

## 2020-10-26 PROCEDURE — 87483 CNS DNA AMP PROBE TYPE 12-25: CPT

## 2020-10-26 PROCEDURE — 87045 FECES CULTURE AEROBIC BACT: CPT

## 2020-10-26 PROCEDURE — 83970 ASSAY OF PARATHORMONE: CPT

## 2020-10-26 PROCEDURE — 83550 IRON BINDING TEST: CPT

## 2020-10-26 PROCEDURE — 74018 RADEX ABDOMEN 1 VIEW: CPT

## 2020-10-26 PROCEDURE — 84436 ASSAY OF TOTAL THYROXINE: CPT

## 2020-10-26 PROCEDURE — 96374 THER/PROPH/DIAG INJ IV PUSH: CPT | Mod: XU

## 2020-10-26 PROCEDURE — 86255 FLUORESCENT ANTIBODY SCREEN: CPT

## 2020-10-26 PROCEDURE — 85652 RBC SED RATE AUTOMATED: CPT

## 2020-10-26 PROCEDURE — 89051 BODY FLUID CELL COUNT: CPT

## 2020-10-26 PROCEDURE — 82595 ASSAY OF CRYOGLOBULIN: CPT

## 2020-10-26 PROCEDURE — 85025 COMPLETE CBC W/AUTO DIFF WBC: CPT

## 2020-10-26 PROCEDURE — 86769 SARS-COV-2 COVID-19 ANTIBODY: CPT

## 2020-10-26 PROCEDURE — 82570 ASSAY OF URINE CREATININE: CPT

## 2020-10-26 PROCEDURE — C9254: CPT

## 2020-10-26 PROCEDURE — 83930 ASSAY OF BLOOD OSMOLALITY: CPT

## 2020-10-26 PROCEDURE — 93005 ELECTROCARDIOGRAM TRACING: CPT | Mod: XU

## 2020-10-26 PROCEDURE — 82330 ASSAY OF CALCIUM: CPT

## 2020-10-26 PROCEDURE — 86335 IMMUNFIX E-PHORSIS/URINE/CSF: CPT

## 2020-10-26 PROCEDURE — 82652 VIT D 1 25-DIHYDROXY: CPT

## 2020-10-26 PROCEDURE — 84157 ASSAY OF PROTEIN OTHER: CPT

## 2020-10-26 PROCEDURE — 84132 ASSAY OF SERUM POTASSIUM: CPT

## 2020-10-26 PROCEDURE — 82803 BLOOD GASES ANY COMBINATION: CPT

## 2020-10-26 PROCEDURE — 85610 PROTHROMBIN TIME: CPT

## 2020-10-26 PROCEDURE — 87902 NFCT AGT GNTYP ALYS HEP C: CPT

## 2020-10-26 PROCEDURE — 88341 IMHCHEM/IMCYTCHM EA ADD ANTB: CPT

## 2020-10-26 PROCEDURE — U0003: CPT

## 2020-10-26 PROCEDURE — 86705 HEP B CORE ANTIBODY IGM: CPT

## 2020-10-26 PROCEDURE — 87449 NOS EACH ORGANISM AG IA: CPT

## 2020-10-26 PROCEDURE — 86225 DNA ANTIBODY NATIVE: CPT

## 2020-10-26 PROCEDURE — 71260 CT THORAX DX C+: CPT

## 2020-10-26 PROCEDURE — 83516 IMMUNOASSAY NONANTIBODY: CPT

## 2020-10-26 PROCEDURE — 82977 ASSAY OF GGT: CPT

## 2020-10-26 PROCEDURE — 82553 CREATINE MB FRACTION: CPT

## 2020-10-26 PROCEDURE — 87521 HEPATITIS C PROBE&RVRS TRNSC: CPT

## 2020-10-26 PROCEDURE — 84155 ASSAY OF PROTEIN SERUM: CPT

## 2020-10-26 PROCEDURE — 86803 HEPATITIS C AB TEST: CPT

## 2020-10-26 PROCEDURE — 36556 INSERT NON-TUNNEL CV CATH: CPT

## 2020-10-26 PROCEDURE — 86431 RHEUMATOID FACTOR QUANT: CPT

## 2020-10-26 PROCEDURE — 84156 ASSAY OF PROTEIN URINE: CPT

## 2020-10-26 PROCEDURE — 95711 VEEG 2-12 HR UNMONITORED: CPT

## 2020-10-29 LAB — VIT B1 SERPL-MCNC: SIGNIFICANT CHANGE UP NMOL/L

## 2020-11-02 LAB
CULTURE RESULTS: NO GROWTH — SIGNIFICANT CHANGE UP
SPECIMEN SOURCE: SIGNIFICANT CHANGE UP

## 2021-09-06 NOTE — PROGRESS NOTE ADULT - ASSESSMENT
Seizure in the setting of metabolic disturbances and normal head CT  Given depakote 1000mg IV x 1, continue 500mg BID starting 6pm tonight, check level prior to 4th dose  Continue EEG  Will follow Adult

## 2023-10-04 NOTE — PROCEDURAL SAFETY CHECKLIST WITH OR WITHOUT SEDATION - NSPREALLERGYSED_GEN_ALL_CORE
Goal Outcome Evaluation:   VSS on room air; A&Ox4; pt c/o vertigo/distorted vision earlier this shift; Dr. Valenzuela made aware; insulin increased; pt dcd home with ; IV removed; dc paperwork reviewed with pt and ; pt had no further questions; pt refused to pay for meds to beds but has atorvastatin at home.                       done

## 2024-05-30 NOTE — PROGRESS NOTE ADULT - PROBLEM SELECTOR PLAN 2
None CT head negative on 10/17. Undergoing workup for underlying cause of refractory seizure.  -Appreciate MICU, neurology management   - Patient will have bedside CTH in AM to assess, cannot go for MRI 2/2 multiple drips required.  - started Acyclovir 200 daily for CNS coverage, out of concern for HSV encephalitis  - started Ceftriaxone 2000 mg daily for bacterial meningitis coverage